# Patient Record
Sex: MALE | Race: WHITE | Employment: OTHER | URBAN - METROPOLITAN AREA
[De-identification: names, ages, dates, MRNs, and addresses within clinical notes are randomized per-mention and may not be internally consistent; named-entity substitution may affect disease eponyms.]

---

## 2017-01-03 ENCOUNTER — TELEPHONE (OUTPATIENT)
Dept: CARDIOLOGY CLINIC | Age: 72
End: 2017-01-03

## 2017-01-03 PROBLEM — G45.9 TIA (TRANSIENT ISCHEMIC ATTACK): Status: ACTIVE | Noted: 2017-01-03

## 2017-01-15 ENCOUNTER — CARE COORDINATION (OUTPATIENT)
Dept: CASE MANAGEMENT | Age: 72
End: 2017-01-15

## 2017-01-16 ENCOUNTER — TELEPHONE (OUTPATIENT)
Dept: INTERNAL MEDICINE CLINIC | Age: 72
End: 2017-01-16

## 2017-01-17 ENCOUNTER — CARE COORDINATION (OUTPATIENT)
Dept: CASE MANAGEMENT | Age: 72
End: 2017-01-17

## 2017-01-19 ENCOUNTER — CARE COORDINATION (OUTPATIENT)
Dept: CASE MANAGEMENT | Age: 72
End: 2017-01-19

## 2017-01-20 ENCOUNTER — OFFICE VISIT (OUTPATIENT)
Dept: INTERNAL MEDICINE CLINIC | Age: 72
End: 2017-01-20

## 2017-01-20 ENCOUNTER — OFFICE VISIT (OUTPATIENT)
Dept: CARDIOLOGY CLINIC | Age: 72
End: 2017-01-20

## 2017-01-20 VITALS
OXYGEN SATURATION: 97 % | DIASTOLIC BLOOD PRESSURE: 60 MMHG | HEIGHT: 73 IN | WEIGHT: 272.6 LBS | SYSTOLIC BLOOD PRESSURE: 110 MMHG | BODY MASS INDEX: 36.13 KG/M2 | HEART RATE: 112 BPM

## 2017-01-20 VITALS
HEART RATE: 94 BPM | SYSTOLIC BLOOD PRESSURE: 123 MMHG | BODY MASS INDEX: 36.05 KG/M2 | OXYGEN SATURATION: 97 % | DIASTOLIC BLOOD PRESSURE: 74 MMHG | WEIGHT: 272 LBS

## 2017-01-20 DIAGNOSIS — I25.10 CORONARY ARTERY DISEASE INVOLVING NATIVE CORONARY ARTERY OF NATIVE HEART WITHOUT ANGINA PECTORIS: ICD-10-CM

## 2017-01-20 DIAGNOSIS — I50.32 CHRONIC DIASTOLIC CONGESTIVE HEART FAILURE (HCC): ICD-10-CM

## 2017-01-20 DIAGNOSIS — I10 ESSENTIAL HYPERTENSION: ICD-10-CM

## 2017-01-20 DIAGNOSIS — D61.89 ANEMIA DUE TO OTHER BONE MARROW FAILURE (HCC): ICD-10-CM

## 2017-01-20 DIAGNOSIS — Z09 HOSPITAL DISCHARGE FOLLOW-UP: Primary | ICD-10-CM

## 2017-01-20 DIAGNOSIS — R06.09 DOE (DYSPNEA ON EXERTION): ICD-10-CM

## 2017-01-20 DIAGNOSIS — I48.20 CHRONIC ATRIAL FIBRILLATION (HCC): Primary | ICD-10-CM

## 2017-01-20 DIAGNOSIS — I63.9 CEREBROVASCULAR ACCIDENT (CVA), UNSPECIFIED MECHANISM (HCC): ICD-10-CM

## 2017-01-20 PROCEDURE — G8427 DOCREV CUR MEDS BY ELIG CLIN: HCPCS | Performed by: NURSE PRACTITIONER

## 2017-01-20 PROCEDURE — G8484 FLU IMMUNIZE NO ADMIN: HCPCS | Performed by: NURSE PRACTITIONER

## 2017-01-20 PROCEDURE — 1036F TOBACCO NON-USER: CPT | Performed by: NURSE PRACTITIONER

## 2017-01-20 PROCEDURE — G8598 ASA/ANTIPLAT THER USED: HCPCS | Performed by: NURSE PRACTITIONER

## 2017-01-20 PROCEDURE — 4040F PNEUMOC VAC/ADMIN/RCVD: CPT | Performed by: NURSE PRACTITIONER

## 2017-01-20 PROCEDURE — 1111F DSCHRG MED/CURRENT MED MERGE: CPT | Performed by: NURSE PRACTITIONER

## 2017-01-20 PROCEDURE — G8419 CALC BMI OUT NRM PARAM NOF/U: HCPCS | Performed by: NURSE PRACTITIONER

## 2017-01-20 PROCEDURE — 99214 OFFICE O/P EST MOD 30 MIN: CPT | Performed by: NURSE PRACTITIONER

## 2017-01-20 PROCEDURE — 1123F ACP DISCUSS/DSCN MKR DOCD: CPT | Performed by: NURSE PRACTITIONER

## 2017-01-20 PROCEDURE — 3017F COLORECTAL CA SCREEN DOC REV: CPT | Performed by: NURSE PRACTITIONER

## 2017-01-23 ENCOUNTER — CARE COORDINATION (OUTPATIENT)
Dept: CASE MANAGEMENT | Age: 72
End: 2017-01-23

## 2017-01-26 PROBLEM — I63.9 CEREBROVASCULAR ACCIDENT (CVA) (HCC): Status: ACTIVE | Noted: 2017-01-26

## 2017-01-26 PROBLEM — D61.9 ANEMIA DUE TO BONE MARROW FAILURE (HCC): Status: ACTIVE | Noted: 2017-01-26

## 2017-01-26 PROBLEM — G45.9 TIA (TRANSIENT ISCHEMIC ATTACK): Status: RESOLVED | Noted: 2017-01-03 | Resolved: 2017-01-26

## 2017-01-27 ENCOUNTER — CARE COORDINATION (OUTPATIENT)
Dept: CASE MANAGEMENT | Age: 72
End: 2017-01-27

## 2017-02-06 ENCOUNTER — OFFICE VISIT (OUTPATIENT)
Dept: CARDIOLOGY CLINIC | Age: 72
End: 2017-02-06

## 2017-02-06 ENCOUNTER — CARE COORDINATION (OUTPATIENT)
Dept: INTERNAL MEDICINE CLINIC | Age: 72
End: 2017-02-06

## 2017-02-06 VITALS
OXYGEN SATURATION: 98 % | WEIGHT: 269 LBS | HEART RATE: 90 BPM | DIASTOLIC BLOOD PRESSURE: 56 MMHG | HEIGHT: 73 IN | BODY MASS INDEX: 35.65 KG/M2 | SYSTOLIC BLOOD PRESSURE: 118 MMHG

## 2017-02-06 DIAGNOSIS — I10 ESSENTIAL HYPERTENSION: ICD-10-CM

## 2017-02-06 DIAGNOSIS — I63.9 CEREBROVASCULAR ACCIDENT (CVA), UNSPECIFIED MECHANISM (HCC): ICD-10-CM

## 2017-02-06 DIAGNOSIS — I48.20 CHRONIC ATRIAL FIBRILLATION (HCC): Primary | ICD-10-CM

## 2017-02-06 DIAGNOSIS — I50.32 CHRONIC DIASTOLIC CONGESTIVE HEART FAILURE (HCC): ICD-10-CM

## 2017-02-06 PROCEDURE — 99214 OFFICE O/P EST MOD 30 MIN: CPT | Performed by: INTERNAL MEDICINE

## 2017-02-06 PROCEDURE — 93000 ELECTROCARDIOGRAM COMPLETE: CPT | Performed by: INTERNAL MEDICINE

## 2017-02-06 PROCEDURE — 1111F DSCHRG MED/CURRENT MED MERGE: CPT | Performed by: INTERNAL MEDICINE

## 2017-02-06 PROCEDURE — G8427 DOCREV CUR MEDS BY ELIG CLIN: HCPCS | Performed by: INTERNAL MEDICINE

## 2017-02-06 PROCEDURE — 3017F COLORECTAL CA SCREEN DOC REV: CPT | Performed by: INTERNAL MEDICINE

## 2017-02-06 PROCEDURE — G8419 CALC BMI OUT NRM PARAM NOF/U: HCPCS | Performed by: INTERNAL MEDICINE

## 2017-02-06 PROCEDURE — 1123F ACP DISCUSS/DSCN MKR DOCD: CPT | Performed by: INTERNAL MEDICINE

## 2017-02-06 PROCEDURE — 4040F PNEUMOC VAC/ADMIN/RCVD: CPT | Performed by: INTERNAL MEDICINE

## 2017-02-06 PROCEDURE — 1036F TOBACCO NON-USER: CPT | Performed by: INTERNAL MEDICINE

## 2017-02-06 PROCEDURE — G8484 FLU IMMUNIZE NO ADMIN: HCPCS | Performed by: INTERNAL MEDICINE

## 2017-02-06 PROCEDURE — G8598 ASA/ANTIPLAT THER USED: HCPCS | Performed by: INTERNAL MEDICINE

## 2017-02-06 RX ORDER — ATORVASTATIN CALCIUM 40 MG/1
40 TABLET, FILM COATED ORAL DAILY
Qty: 90 TABLET | Refills: 0 | Status: SHIPPED | OUTPATIENT
Start: 2017-02-06 | End: 2017-03-14 | Stop reason: ALTCHOICE

## 2017-02-12 PROCEDURE — 99496 TRANSJ CARE MGMT HIGH F2F 7D: CPT | Performed by: INTERNAL MEDICINE

## 2017-03-06 ENCOUNTER — TELEPHONE (OUTPATIENT)
Dept: CARDIOLOGY CLINIC | Age: 72
End: 2017-03-06

## 2017-03-10 ENCOUNTER — TELEPHONE (OUTPATIENT)
Dept: INTERNAL MEDICINE CLINIC | Age: 72
End: 2017-03-10

## 2017-03-10 ENCOUNTER — TELEPHONE (OUTPATIENT)
Dept: CARDIOLOGY CLINIC | Age: 72
End: 2017-03-10

## 2017-03-10 DIAGNOSIS — I50.32 CHRONIC DIASTOLIC CONGESTIVE HEART FAILURE (HCC): Primary | ICD-10-CM

## 2017-03-13 LAB
ANION GAP SERPL CALCULATED.3IONS-SCNC: 15 MMOL/L (ref 3–16)
BUN BLDV-MCNC: 22 MG/DL (ref 7–20)
CALCIUM SERPL-MCNC: 9.1 MG/DL (ref 8.3–10.6)
CHLORIDE BLD-SCNC: 102 MMOL/L (ref 99–110)
CO2: 29 MMOL/L (ref 21–32)
CREAT SERPL-MCNC: 0.6 MG/DL (ref 0.8–1.3)
GFR AFRICAN AMERICAN: >60
GFR NON-AFRICAN AMERICAN: >60
GLUCOSE BLD-MCNC: 119 MG/DL (ref 70–99)
POTASSIUM SERPL-SCNC: 3.6 MMOL/L (ref 3.5–5.1)
SODIUM BLD-SCNC: 146 MMOL/L (ref 136–145)

## 2017-03-13 RX ORDER — LISINOPRIL 5 MG/1
TABLET ORAL
Qty: 90 TABLET | Refills: 3 | Status: SHIPPED | OUTPATIENT
Start: 2017-03-13 | End: 2019-05-22 | Stop reason: SDUPTHER

## 2017-03-14 ENCOUNTER — OFFICE VISIT (OUTPATIENT)
Dept: INTERNAL MEDICINE CLINIC | Age: 72
End: 2017-03-14

## 2017-03-14 VITALS
RESPIRATION RATE: 20 BRPM | DIASTOLIC BLOOD PRESSURE: 75 MMHG | HEART RATE: 84 BPM | HEIGHT: 72 IN | WEIGHT: 291 LBS | SYSTOLIC BLOOD PRESSURE: 136 MMHG | BODY MASS INDEX: 39.42 KG/M2

## 2017-03-14 DIAGNOSIS — R60.1 ANASARCA: ICD-10-CM

## 2017-03-14 DIAGNOSIS — C83.31 DIFFUSE LARGE B-CELL LYMPHOMA OF LYMPH NODES OF NECK (HCC): ICD-10-CM

## 2017-03-14 DIAGNOSIS — L03.119 CELLULITIS OF LOWER EXTREMITY, UNSPECIFIED LATERALITY: ICD-10-CM

## 2017-03-14 DIAGNOSIS — R53.1 GENERAL WEAKNESS: ICD-10-CM

## 2017-03-14 DIAGNOSIS — I48.20 CHRONIC ATRIAL FIBRILLATION (HCC): ICD-10-CM

## 2017-03-14 DIAGNOSIS — I50.9 ACUTE ON CHRONIC CONGESTIVE HEART FAILURE, UNSPECIFIED CONGESTIVE HEART FAILURE TYPE: Primary | ICD-10-CM

## 2017-03-14 PROCEDURE — 99214 OFFICE O/P EST MOD 30 MIN: CPT | Performed by: INTERNAL MEDICINE

## 2017-03-14 PROCEDURE — 3017F COLORECTAL CA SCREEN DOC REV: CPT | Performed by: INTERNAL MEDICINE

## 2017-03-14 PROCEDURE — 4040F PNEUMOC VAC/ADMIN/RCVD: CPT | Performed by: INTERNAL MEDICINE

## 2017-03-14 PROCEDURE — 1123F ACP DISCUSS/DSCN MKR DOCD: CPT | Performed by: INTERNAL MEDICINE

## 2017-03-14 PROCEDURE — 1036F TOBACCO NON-USER: CPT | Performed by: INTERNAL MEDICINE

## 2017-03-14 PROCEDURE — G8598 ASA/ANTIPLAT THER USED: HCPCS | Performed by: INTERNAL MEDICINE

## 2017-03-14 PROCEDURE — G8417 CALC BMI ABV UP PARAM F/U: HCPCS | Performed by: INTERNAL MEDICINE

## 2017-03-14 PROCEDURE — G8427 DOCREV CUR MEDS BY ELIG CLIN: HCPCS | Performed by: INTERNAL MEDICINE

## 2017-03-14 PROCEDURE — G8484 FLU IMMUNIZE NO ADMIN: HCPCS | Performed by: INTERNAL MEDICINE

## 2017-03-14 RX ORDER — METOPROLOL SUCCINATE 25 MG/1
TABLET, EXTENDED RELEASE ORAL
Qty: 90 TABLET | Refills: 1 | Status: SHIPPED | OUTPATIENT
Start: 2017-03-14 | End: 2017-09-18 | Stop reason: SDUPTHER

## 2017-03-15 ENCOUNTER — TELEPHONE (OUTPATIENT)
Dept: CARDIOLOGY CLINIC | Age: 72
End: 2017-03-15

## 2017-03-15 ENCOUNTER — OFFICE VISIT (OUTPATIENT)
Dept: CARDIOLOGY CLINIC | Age: 72
End: 2017-03-15

## 2017-03-15 ENCOUNTER — CARE COORDINATION (OUTPATIENT)
Dept: INTERNAL MEDICINE CLINIC | Age: 72
End: 2017-03-15

## 2017-03-15 VITALS
DIASTOLIC BLOOD PRESSURE: 66 MMHG | SYSTOLIC BLOOD PRESSURE: 116 MMHG | BODY MASS INDEX: 38.3 KG/M2 | HEIGHT: 73 IN | HEART RATE: 86 BPM | WEIGHT: 289 LBS | OXYGEN SATURATION: 96 %

## 2017-03-15 DIAGNOSIS — I48.20 CHRONIC ATRIAL FIBRILLATION (HCC): Primary | ICD-10-CM

## 2017-03-15 DIAGNOSIS — I50.32 CHRONIC DIASTOLIC CONGESTIVE HEART FAILURE (HCC): ICD-10-CM

## 2017-03-15 DIAGNOSIS — I10 ESSENTIAL HYPERTENSION: ICD-10-CM

## 2017-03-15 DIAGNOSIS — I63.9 CEREBROVASCULAR ACCIDENT (CVA), UNSPECIFIED MECHANISM (HCC): ICD-10-CM

## 2017-03-15 PROCEDURE — G8484 FLU IMMUNIZE NO ADMIN: HCPCS | Performed by: INTERNAL MEDICINE

## 2017-03-15 PROCEDURE — 4040F PNEUMOC VAC/ADMIN/RCVD: CPT | Performed by: INTERNAL MEDICINE

## 2017-03-15 PROCEDURE — G8427 DOCREV CUR MEDS BY ELIG CLIN: HCPCS | Performed by: INTERNAL MEDICINE

## 2017-03-15 PROCEDURE — 99214 OFFICE O/P EST MOD 30 MIN: CPT | Performed by: INTERNAL MEDICINE

## 2017-03-15 PROCEDURE — G8598 ASA/ANTIPLAT THER USED: HCPCS | Performed by: INTERNAL MEDICINE

## 2017-03-15 PROCEDURE — 1036F TOBACCO NON-USER: CPT | Performed by: INTERNAL MEDICINE

## 2017-03-15 PROCEDURE — 3017F COLORECTAL CA SCREEN DOC REV: CPT | Performed by: INTERNAL MEDICINE

## 2017-03-15 PROCEDURE — G8417 CALC BMI ABV UP PARAM F/U: HCPCS | Performed by: INTERNAL MEDICINE

## 2017-03-15 PROCEDURE — 1123F ACP DISCUSS/DSCN MKR DOCD: CPT | Performed by: INTERNAL MEDICINE

## 2017-03-15 RX ORDER — POTASSIUM CHLORIDE 20 MEQ/1
20 TABLET, EXTENDED RELEASE ORAL 2 TIMES DAILY
Qty: 60 TABLET | Refills: 3 | Status: SHIPPED | OUTPATIENT
Start: 2017-03-15 | End: 2017-03-15 | Stop reason: SDUPTHER

## 2017-03-15 RX ORDER — METOLAZONE 2.5 MG/1
TABLET ORAL
Qty: 30 TABLET | Refills: 3 | Status: SHIPPED | OUTPATIENT
Start: 2017-03-15 | End: 2017-03-15 | Stop reason: SDUPTHER

## 2017-03-15 RX ORDER — FUROSEMIDE 40 MG/1
60 TABLET ORAL 2 TIMES DAILY
Qty: 90 TABLET | Refills: 3 | Status: SHIPPED | OUTPATIENT
Start: 2017-03-15 | End: 2017-03-24 | Stop reason: DRUGHIGH

## 2017-03-15 RX ORDER — POTASSIUM CHLORIDE 20 MEQ/1
TABLET, EXTENDED RELEASE ORAL
Qty: 180 TABLET | Refills: 3 | Status: SHIPPED | OUTPATIENT
Start: 2017-03-15 | End: 2017-04-26 | Stop reason: DRUGHIGH

## 2017-03-15 RX ORDER — METOLAZONE 2.5 MG/1
TABLET ORAL
Qty: 30 TABLET | Refills: 3 | Status: SHIPPED | OUTPATIENT
Start: 2017-03-15 | End: 2017-03-24 | Stop reason: CLARIF

## 2017-03-15 RX ORDER — ATORVASTATIN CALCIUM 40 MG/1
40 TABLET, FILM COATED ORAL DAILY
Qty: 90 TABLET | Refills: 0 | Status: SHIPPED | OUTPATIENT
Start: 2017-03-15 | End: 2017-03-15 | Stop reason: DRUGHIGH

## 2017-03-16 DIAGNOSIS — R60.1 ANASARCA: ICD-10-CM

## 2017-03-16 DIAGNOSIS — I50.33 DIASTOLIC CHF, ACUTE ON CHRONIC (HCC): Primary | ICD-10-CM

## 2017-03-17 ENCOUNTER — CARE COORDINATION (OUTPATIENT)
Dept: INTERNAL MEDICINE CLINIC | Age: 72
End: 2017-03-17

## 2017-03-21 LAB
ANION GAP SERPL CALCULATED.3IONS-SCNC: 14 MMOL/L (ref 3–16)
BUN BLDV-MCNC: 20 MG/DL (ref 7–20)
CALCIUM SERPL-MCNC: 8.9 MG/DL (ref 8.3–10.6)
CHLORIDE BLD-SCNC: 103 MMOL/L (ref 99–110)
CO2: 29 MMOL/L (ref 21–32)
CREAT SERPL-MCNC: 0.6 MG/DL (ref 0.8–1.3)
GFR AFRICAN AMERICAN: >60
GFR NON-AFRICAN AMERICAN: >60
GLUCOSE BLD-MCNC: 117 MG/DL (ref 70–99)
POTASSIUM SERPL-SCNC: 3.8 MMOL/L (ref 3.5–5.1)
SODIUM BLD-SCNC: 146 MMOL/L (ref 136–145)

## 2017-03-23 ENCOUNTER — CARE COORDINATION (OUTPATIENT)
Dept: INTERNAL MEDICINE CLINIC | Age: 72
End: 2017-03-23

## 2017-03-23 ENCOUNTER — TELEPHONE (OUTPATIENT)
Dept: CARDIOLOGY CLINIC | Age: 72
End: 2017-03-23

## 2017-03-24 ENCOUNTER — OFFICE VISIT (OUTPATIENT)
Dept: CARDIOLOGY CLINIC | Age: 72
End: 2017-03-24

## 2017-03-24 ENCOUNTER — CARE COORDINATION (OUTPATIENT)
Dept: INTERNAL MEDICINE CLINIC | Age: 72
End: 2017-03-24

## 2017-03-24 VITALS
HEIGHT: 73 IN | HEART RATE: 84 BPM | SYSTOLIC BLOOD PRESSURE: 128 MMHG | DIASTOLIC BLOOD PRESSURE: 76 MMHG | WEIGHT: 280 LBS | BODY MASS INDEX: 37.11 KG/M2 | OXYGEN SATURATION: 98 %

## 2017-03-24 DIAGNOSIS — I10 ESSENTIAL HYPERTENSION: ICD-10-CM

## 2017-03-24 DIAGNOSIS — I48.20 CHRONIC ATRIAL FIBRILLATION (HCC): ICD-10-CM

## 2017-03-24 DIAGNOSIS — L03.115 CELLULITIS OF RIGHT LEG: ICD-10-CM

## 2017-03-24 DIAGNOSIS — I25.10 CORONARY ARTERY DISEASE INVOLVING NATIVE CORONARY ARTERY OF NATIVE HEART WITHOUT ANGINA PECTORIS: ICD-10-CM

## 2017-03-24 DIAGNOSIS — I50.32 CHRONIC DIASTOLIC CONGESTIVE HEART FAILURE (HCC): Primary | ICD-10-CM

## 2017-03-24 PROCEDURE — G8417 CALC BMI ABV UP PARAM F/U: HCPCS | Performed by: NURSE PRACTITIONER

## 2017-03-24 PROCEDURE — G8484 FLU IMMUNIZE NO ADMIN: HCPCS | Performed by: NURSE PRACTITIONER

## 2017-03-24 PROCEDURE — 3017F COLORECTAL CA SCREEN DOC REV: CPT | Performed by: NURSE PRACTITIONER

## 2017-03-24 PROCEDURE — G8598 ASA/ANTIPLAT THER USED: HCPCS | Performed by: NURSE PRACTITIONER

## 2017-03-24 PROCEDURE — 99214 OFFICE O/P EST MOD 30 MIN: CPT | Performed by: NURSE PRACTITIONER

## 2017-03-24 PROCEDURE — G8427 DOCREV CUR MEDS BY ELIG CLIN: HCPCS | Performed by: NURSE PRACTITIONER

## 2017-03-24 PROCEDURE — 4040F PNEUMOC VAC/ADMIN/RCVD: CPT | Performed by: NURSE PRACTITIONER

## 2017-03-24 PROCEDURE — 1036F TOBACCO NON-USER: CPT | Performed by: NURSE PRACTITIONER

## 2017-03-24 PROCEDURE — 1123F ACP DISCUSS/DSCN MKR DOCD: CPT | Performed by: NURSE PRACTITIONER

## 2017-03-24 RX ORDER — FUROSEMIDE 80 MG
80 TABLET ORAL 2 TIMES DAILY
COMMUNITY
End: 2017-04-10 | Stop reason: ALTCHOICE

## 2017-04-04 ENCOUNTER — CARE COORDINATION (OUTPATIENT)
Dept: INTERNAL MEDICINE CLINIC | Age: 72
End: 2017-04-04

## 2017-04-04 ENCOUNTER — OFFICE VISIT (OUTPATIENT)
Dept: INTERNAL MEDICINE CLINIC | Age: 72
End: 2017-04-04

## 2017-04-04 VITALS
SYSTOLIC BLOOD PRESSURE: 131 MMHG | HEART RATE: 117 BPM | HEIGHT: 72 IN | RESPIRATION RATE: 20 BRPM | BODY MASS INDEX: 38.74 KG/M2 | DIASTOLIC BLOOD PRESSURE: 76 MMHG | WEIGHT: 286 LBS

## 2017-04-04 DIAGNOSIS — I87.2 VENOUS STASIS DERMATITIS OF BOTH LOWER EXTREMITIES: ICD-10-CM

## 2017-04-04 DIAGNOSIS — I50.32 CHRONIC DIASTOLIC CONGESTIVE HEART FAILURE (HCC): Primary | ICD-10-CM

## 2017-04-04 DIAGNOSIS — I48.20 CHRONIC ATRIAL FIBRILLATION (HCC): ICD-10-CM

## 2017-04-04 DIAGNOSIS — I50.32 CHRONIC DIASTOLIC CONGESTIVE HEART FAILURE (HCC): ICD-10-CM

## 2017-04-04 DIAGNOSIS — R60.1 ANASARCA: ICD-10-CM

## 2017-04-04 DIAGNOSIS — Z91.199 PATIENT NONADHERENCE: ICD-10-CM

## 2017-04-04 LAB
ALBUMIN SERPL-MCNC: 4.2 G/DL (ref 3.4–5)
ANION GAP SERPL CALCULATED.3IONS-SCNC: 18 MMOL/L (ref 3–16)
BASOPHILS ABSOLUTE: 0.1 K/UL (ref 0–0.2)
BASOPHILS RELATIVE PERCENT: 0.7 %
BUN BLDV-MCNC: 20 MG/DL (ref 7–20)
CALCIUM SERPL-MCNC: 9.2 MG/DL (ref 8.3–10.6)
CHLORIDE BLD-SCNC: 99 MMOL/L (ref 99–110)
CO2: 27 MMOL/L (ref 21–32)
CREAT SERPL-MCNC: 0.6 MG/DL (ref 0.8–1.3)
EOSINOPHILS ABSOLUTE: 0.2 K/UL (ref 0–0.6)
EOSINOPHILS RELATIVE PERCENT: 3.2 %
GFR AFRICAN AMERICAN: >60
GFR NON-AFRICAN AMERICAN: >60
GLUCOSE BLD-MCNC: 115 MG/DL (ref 70–99)
HCT VFR BLD CALC: 38 % (ref 40.5–52.5)
HEMOGLOBIN: 12.2 G/DL (ref 13.5–17.5)
LYMPHOCYTES ABSOLUTE: 0.6 K/UL (ref 1–5.1)
LYMPHOCYTES RELATIVE PERCENT: 9 %
MCH RBC QN AUTO: 32.2 PG (ref 26–34)
MCHC RBC AUTO-ENTMCNC: 32.1 G/DL (ref 31–36)
MCV RBC AUTO: 100.4 FL (ref 80–100)
MONOCYTES ABSOLUTE: 0.9 K/UL (ref 0–1.3)
MONOCYTES RELATIVE PERCENT: 12.6 %
NEUTROPHILS ABSOLUTE: 5.2 K/UL (ref 1.7–7.7)
NEUTROPHILS RELATIVE PERCENT: 74.5 %
PDW BLD-RTO: 15.8 % (ref 12.4–15.4)
PHOSPHORUS: 2.7 MG/DL (ref 2.5–4.9)
PLATELET # BLD: 127 K/UL (ref 135–450)
PMV BLD AUTO: 9.2 FL (ref 5–10.5)
POTASSIUM SERPL-SCNC: 3.8 MMOL/L (ref 3.5–5.1)
RBC # BLD: 3.78 M/UL (ref 4.2–5.9)
SODIUM BLD-SCNC: 144 MMOL/L (ref 136–145)
WBC # BLD: 7 K/UL (ref 4–11)

## 2017-04-04 PROCEDURE — 1036F TOBACCO NON-USER: CPT | Performed by: INTERNAL MEDICINE

## 2017-04-04 PROCEDURE — 1123F ACP DISCUSS/DSCN MKR DOCD: CPT | Performed by: INTERNAL MEDICINE

## 2017-04-04 PROCEDURE — G8427 DOCREV CUR MEDS BY ELIG CLIN: HCPCS | Performed by: INTERNAL MEDICINE

## 2017-04-04 PROCEDURE — G8598 ASA/ANTIPLAT THER USED: HCPCS | Performed by: INTERNAL MEDICINE

## 2017-04-04 PROCEDURE — 99215 OFFICE O/P EST HI 40 MIN: CPT | Performed by: INTERNAL MEDICINE

## 2017-04-04 PROCEDURE — G8417 CALC BMI ABV UP PARAM F/U: HCPCS | Performed by: INTERNAL MEDICINE

## 2017-04-04 PROCEDURE — 3017F COLORECTAL CA SCREEN DOC REV: CPT | Performed by: INTERNAL MEDICINE

## 2017-04-04 PROCEDURE — 4040F PNEUMOC VAC/ADMIN/RCVD: CPT | Performed by: INTERNAL MEDICINE

## 2017-04-10 ENCOUNTER — OFFICE VISIT (OUTPATIENT)
Dept: CARDIOLOGY CLINIC | Age: 72
End: 2017-04-10

## 2017-04-10 VITALS
BODY MASS INDEX: 37.77 KG/M2 | DIASTOLIC BLOOD PRESSURE: 60 MMHG | WEIGHT: 285 LBS | OXYGEN SATURATION: 97 % | SYSTOLIC BLOOD PRESSURE: 106 MMHG | HEART RATE: 94 BPM | HEIGHT: 73 IN

## 2017-04-10 DIAGNOSIS — I25.10 CORONARY ARTERY DISEASE INVOLVING NATIVE CORONARY ARTERY OF NATIVE HEART WITHOUT ANGINA PECTORIS: ICD-10-CM

## 2017-04-10 DIAGNOSIS — I50.33 ACUTE ON CHRONIC DIASTOLIC (CONGESTIVE) HEART FAILURE (HCC): ICD-10-CM

## 2017-04-10 DIAGNOSIS — I10 ESSENTIAL HYPERTENSION: Primary | ICD-10-CM

## 2017-04-10 DIAGNOSIS — I48.20 CHRONIC ATRIAL FIBRILLATION (HCC): ICD-10-CM

## 2017-04-10 PROCEDURE — G8417 CALC BMI ABV UP PARAM F/U: HCPCS | Performed by: INTERNAL MEDICINE

## 2017-04-10 PROCEDURE — 1123F ACP DISCUSS/DSCN MKR DOCD: CPT | Performed by: INTERNAL MEDICINE

## 2017-04-10 PROCEDURE — 3017F COLORECTAL CA SCREEN DOC REV: CPT | Performed by: INTERNAL MEDICINE

## 2017-04-10 PROCEDURE — 1036F TOBACCO NON-USER: CPT | Performed by: INTERNAL MEDICINE

## 2017-04-10 PROCEDURE — G8427 DOCREV CUR MEDS BY ELIG CLIN: HCPCS | Performed by: INTERNAL MEDICINE

## 2017-04-10 PROCEDURE — 99214 OFFICE O/P EST MOD 30 MIN: CPT | Performed by: INTERNAL MEDICINE

## 2017-04-10 PROCEDURE — G8598 ASA/ANTIPLAT THER USED: HCPCS | Performed by: INTERNAL MEDICINE

## 2017-04-10 PROCEDURE — 4040F PNEUMOC VAC/ADMIN/RCVD: CPT | Performed by: INTERNAL MEDICINE

## 2017-04-10 RX ORDER — TORSEMIDE 20 MG/1
40 TABLET ORAL 2 TIMES DAILY
Qty: 120 TABLET | Refills: 5 | Status: SHIPPED | OUTPATIENT
Start: 2017-04-10 | End: 2017-04-10 | Stop reason: SDUPTHER

## 2017-04-11 RX ORDER — TORSEMIDE 20 MG/1
TABLET ORAL
Qty: 360 TABLET | Refills: 3 | Status: SHIPPED | OUTPATIENT
Start: 2017-04-11 | End: 2017-09-01 | Stop reason: SDUPTHER

## 2017-04-17 ENCOUNTER — TELEPHONE (OUTPATIENT)
Dept: INTERNAL MEDICINE CLINIC | Age: 72
End: 2017-04-17

## 2017-04-18 ENCOUNTER — TELEPHONE (OUTPATIENT)
Dept: INTERNAL MEDICINE CLINIC | Age: 72
End: 2017-04-18

## 2017-04-21 ENCOUNTER — TELEPHONE (OUTPATIENT)
Dept: CARDIOLOGY CLINIC | Age: 72
End: 2017-04-21

## 2017-04-26 DIAGNOSIS — I50.32 CHRONIC DIASTOLIC CONGESTIVE HEART FAILURE (HCC): Primary | ICD-10-CM

## 2017-04-26 DIAGNOSIS — R06.02 SOB (SHORTNESS OF BREATH) ON EXERTION: ICD-10-CM

## 2017-04-26 DIAGNOSIS — R60.0 EDEMA EXTREMITIES: ICD-10-CM

## 2017-04-26 RX ORDER — POTASSIUM CHLORIDE 20 MEQ/1
20 TABLET, EXTENDED RELEASE ORAL 2 TIMES DAILY
COMMUNITY
End: 2018-02-01 | Stop reason: DRUGHIGH

## 2017-04-28 ENCOUNTER — TELEPHONE (OUTPATIENT)
Dept: INTERNAL MEDICINE CLINIC | Age: 72
End: 2017-04-28

## 2017-05-03 LAB
ANION GAP SERPL CALCULATED.3IONS-SCNC: 17 MMOL/L (ref 3–16)
BUN BLDV-MCNC: 19 MG/DL (ref 7–20)
CALCIUM SERPL-MCNC: 8.7 MG/DL (ref 8.3–10.6)
CHLORIDE BLD-SCNC: 101 MMOL/L (ref 99–110)
CO2: 29 MMOL/L (ref 21–32)
CREAT SERPL-MCNC: 0.6 MG/DL (ref 0.8–1.3)
GFR AFRICAN AMERICAN: >60
GFR NON-AFRICAN AMERICAN: >60
GLUCOSE BLD-MCNC: 108 MG/DL (ref 70–99)
MAGNESIUM: 2.4 MG/DL (ref 1.8–2.4)
POTASSIUM SERPL-SCNC: 3.8 MMOL/L (ref 3.5–5.1)
SODIUM BLD-SCNC: 147 MMOL/L (ref 136–145)

## 2017-05-08 ENCOUNTER — OFFICE VISIT (OUTPATIENT)
Dept: CARDIOLOGY CLINIC | Age: 72
End: 2017-05-08

## 2017-05-08 VITALS
HEART RATE: 94 BPM | DIASTOLIC BLOOD PRESSURE: 80 MMHG | WEIGHT: 267.2 LBS | OXYGEN SATURATION: 96 % | SYSTOLIC BLOOD PRESSURE: 110 MMHG | BODY MASS INDEX: 36.19 KG/M2 | HEIGHT: 72 IN

## 2017-05-08 DIAGNOSIS — I25.10 CORONARY ARTERY DISEASE INVOLVING NATIVE CORONARY ARTERY OF NATIVE HEART WITHOUT ANGINA PECTORIS: ICD-10-CM

## 2017-05-08 DIAGNOSIS — I50.33 ACUTE ON CHRONIC DIASTOLIC (CONGESTIVE) HEART FAILURE (HCC): Primary | ICD-10-CM

## 2017-05-08 DIAGNOSIS — I10 ESSENTIAL HYPERTENSION: ICD-10-CM

## 2017-05-08 DIAGNOSIS — I48.20 CHRONIC ATRIAL FIBRILLATION (HCC): ICD-10-CM

## 2017-05-08 PROCEDURE — 1123F ACP DISCUSS/DSCN MKR DOCD: CPT | Performed by: NURSE PRACTITIONER

## 2017-05-08 PROCEDURE — G8598 ASA/ANTIPLAT THER USED: HCPCS | Performed by: NURSE PRACTITIONER

## 2017-05-08 PROCEDURE — G8417 CALC BMI ABV UP PARAM F/U: HCPCS | Performed by: NURSE PRACTITIONER

## 2017-05-08 PROCEDURE — 99213 OFFICE O/P EST LOW 20 MIN: CPT | Performed by: NURSE PRACTITIONER

## 2017-05-08 PROCEDURE — G8427 DOCREV CUR MEDS BY ELIG CLIN: HCPCS | Performed by: NURSE PRACTITIONER

## 2017-05-08 PROCEDURE — 4040F PNEUMOC VAC/ADMIN/RCVD: CPT | Performed by: NURSE PRACTITIONER

## 2017-05-08 PROCEDURE — 1036F TOBACCO NON-USER: CPT | Performed by: NURSE PRACTITIONER

## 2017-05-08 PROCEDURE — 3017F COLORECTAL CA SCREEN DOC REV: CPT | Performed by: NURSE PRACTITIONER

## 2017-05-08 RX ORDER — ASPIRIN 81 MG/1
81 TABLET ORAL DAILY
Qty: 30 TABLET | Refills: 3 | Status: SHIPPED | OUTPATIENT
Start: 2017-05-08 | End: 2017-05-08 | Stop reason: SDUPTHER

## 2017-05-08 RX ORDER — ASPIRIN 81 MG/1
TABLET, DELAYED RELEASE ORAL
Qty: 90 TABLET | Refills: 3 | Status: SHIPPED | OUTPATIENT
Start: 2017-05-08 | End: 2018-05-10 | Stop reason: SDUPTHER

## 2017-05-09 RX ORDER — ATORVASTATIN CALCIUM 20 MG/1
20 TABLET, FILM COATED ORAL DAILY
Qty: 30 TABLET | Refills: 6 | Status: SHIPPED | OUTPATIENT
Start: 2017-05-09 | End: 2017-05-09 | Stop reason: SDUPTHER

## 2017-05-10 RX ORDER — ATORVASTATIN CALCIUM 20 MG/1
TABLET, FILM COATED ORAL
Qty: 90 TABLET | Refills: 3 | Status: SHIPPED | OUTPATIENT
Start: 2017-05-10 | End: 2018-05-14 | Stop reason: SDUPTHER

## 2017-05-12 ENCOUNTER — TELEPHONE (OUTPATIENT)
Dept: CARDIOLOGY CLINIC | Age: 72
End: 2017-05-12

## 2017-05-15 LAB
ALBUMIN SERPL-MCNC: 3.8 G/DL (ref 3.4–5)
ALP BLD-CCNC: 136 U/L (ref 40–129)
ALT SERPL-CCNC: 33 U/L (ref 10–40)
AST SERPL-CCNC: 42 U/L (ref 15–37)
BILIRUB SERPL-MCNC: 0.7 MG/DL (ref 0–1)
BILIRUBIN DIRECT: <0.2 MG/DL (ref 0–0.3)
BILIRUBIN, INDIRECT: ABNORMAL MG/DL (ref 0–1)
CHOLESTEROL, TOTAL: 91 MG/DL (ref 0–199)
HDLC SERPL-MCNC: 43 MG/DL (ref 40–60)
LDL CHOLESTEROL CALCULATED: 26 MG/DL
TOTAL PROTEIN: 6.7 G/DL (ref 6.4–8.2)
TRIGL SERPL-MCNC: 109 MG/DL (ref 0–150)
VLDLC SERPL CALC-MCNC: 22 MG/DL

## 2017-05-17 DIAGNOSIS — I50.32 CHRONIC DIASTOLIC CONGESTIVE HEART FAILURE (HCC): Primary | ICD-10-CM

## 2017-05-17 RX ORDER — POTASSIUM CHLORIDE 20 MEQ/1
20 TABLET, EXTENDED RELEASE ORAL 2 TIMES DAILY
Qty: 60 TABLET | Refills: 3 | Status: CANCELLED
Start: 2017-05-17

## 2017-05-17 RX ORDER — SPIRONOLACTONE 25 MG/1
25 TABLET ORAL DAILY
Qty: 30 TABLET | Refills: 6 | Status: CANCELLED | OUTPATIENT
Start: 2017-05-17

## 2017-05-18 RX ORDER — SPIRONOLACTONE 25 MG/1
25 TABLET ORAL DAILY
Qty: 30 TABLET | Refills: 6 | Status: SHIPPED | OUTPATIENT
Start: 2017-05-18 | End: 2017-05-18 | Stop reason: SDUPTHER

## 2017-05-19 ENCOUNTER — TELEPHONE (OUTPATIENT)
Dept: CARDIOLOGY CLINIC | Age: 72
End: 2017-05-19

## 2017-05-19 RX ORDER — SPIRONOLACTONE 25 MG/1
TABLET ORAL
Qty: 90 TABLET | Refills: 3 | Status: ON HOLD | OUTPATIENT
Start: 2017-05-19 | End: 2019-12-09 | Stop reason: HOSPADM

## 2017-06-02 RX ORDER — TORSEMIDE 20 MG/1
TABLET ORAL
Qty: 360 TABLET | Refills: 3 | Status: SHIPPED | OUTPATIENT
Start: 2017-06-02 | End: 2017-06-19 | Stop reason: SDUPTHER

## 2017-06-19 ENCOUNTER — OFFICE VISIT (OUTPATIENT)
Dept: CARDIOLOGY CLINIC | Age: 72
End: 2017-06-19

## 2017-06-19 VITALS
DIASTOLIC BLOOD PRESSURE: 64 MMHG | WEIGHT: 265 LBS | OXYGEN SATURATION: 97 % | BODY MASS INDEX: 35.12 KG/M2 | SYSTOLIC BLOOD PRESSURE: 100 MMHG | HEIGHT: 73 IN | HEART RATE: 86 BPM

## 2017-06-19 DIAGNOSIS — I25.10 CORONARY ARTERY DISEASE INVOLVING NATIVE CORONARY ARTERY OF NATIVE HEART WITHOUT ANGINA PECTORIS: ICD-10-CM

## 2017-06-19 DIAGNOSIS — I10 ESSENTIAL HYPERTENSION: ICD-10-CM

## 2017-06-19 DIAGNOSIS — I48.20 CHRONIC ATRIAL FIBRILLATION (HCC): Primary | ICD-10-CM

## 2017-06-19 DIAGNOSIS — I50.32 CHRONIC DIASTOLIC CONGESTIVE HEART FAILURE (HCC): ICD-10-CM

## 2017-06-19 PROCEDURE — 4040F PNEUMOC VAC/ADMIN/RCVD: CPT | Performed by: INTERNAL MEDICINE

## 2017-06-19 PROCEDURE — 1123F ACP DISCUSS/DSCN MKR DOCD: CPT | Performed by: INTERNAL MEDICINE

## 2017-06-19 PROCEDURE — G8598 ASA/ANTIPLAT THER USED: HCPCS | Performed by: INTERNAL MEDICINE

## 2017-06-19 PROCEDURE — 1036F TOBACCO NON-USER: CPT | Performed by: INTERNAL MEDICINE

## 2017-06-19 PROCEDURE — G8417 CALC BMI ABV UP PARAM F/U: HCPCS | Performed by: INTERNAL MEDICINE

## 2017-06-19 PROCEDURE — 99214 OFFICE O/P EST MOD 30 MIN: CPT | Performed by: INTERNAL MEDICINE

## 2017-06-19 PROCEDURE — 3017F COLORECTAL CA SCREEN DOC REV: CPT | Performed by: INTERNAL MEDICINE

## 2017-06-19 PROCEDURE — G8427 DOCREV CUR MEDS BY ELIG CLIN: HCPCS | Performed by: INTERNAL MEDICINE

## 2017-06-19 PROCEDURE — 93000 ELECTROCARDIOGRAM COMPLETE: CPT | Performed by: INTERNAL MEDICINE

## 2017-06-22 ENCOUNTER — CARE COORDINATION (OUTPATIENT)
Dept: INTERNAL MEDICINE CLINIC | Age: 72
End: 2017-06-22

## 2017-07-07 RX ORDER — SPIRONOLACTONE 25 MG/1
TABLET ORAL
Qty: 90 TABLET | Refills: 3 | Status: SHIPPED | OUTPATIENT
Start: 2017-07-07 | End: 2017-07-07 | Stop reason: SDUPTHER

## 2017-07-10 RX ORDER — SPIRONOLACTONE 25 MG/1
TABLET ORAL
Qty: 30 TABLET | Refills: 5 | Status: SHIPPED | OUTPATIENT
Start: 2017-07-10 | End: 2017-09-01 | Stop reason: SDUPTHER

## 2017-09-01 ENCOUNTER — OFFICE VISIT (OUTPATIENT)
Dept: CARDIOLOGY CLINIC | Age: 72
End: 2017-09-01

## 2017-09-01 VITALS
SYSTOLIC BLOOD PRESSURE: 130 MMHG | DIASTOLIC BLOOD PRESSURE: 70 MMHG | HEIGHT: 73 IN | WEIGHT: 264 LBS | HEART RATE: 94 BPM | OXYGEN SATURATION: 98 % | BODY MASS INDEX: 34.99 KG/M2

## 2017-09-01 DIAGNOSIS — I10 ESSENTIAL HYPERTENSION: ICD-10-CM

## 2017-09-01 DIAGNOSIS — I48.20 CHRONIC ATRIAL FIBRILLATION (HCC): ICD-10-CM

## 2017-09-01 DIAGNOSIS — I50.32 CHRONIC DIASTOLIC CONGESTIVE HEART FAILURE (HCC): Primary | ICD-10-CM

## 2017-09-01 DIAGNOSIS — I25.10 CORONARY ARTERY DISEASE INVOLVING NATIVE CORONARY ARTERY OF NATIVE HEART WITHOUT ANGINA PECTORIS: ICD-10-CM

## 2017-09-01 PROCEDURE — 1123F ACP DISCUSS/DSCN MKR DOCD: CPT | Performed by: INTERNAL MEDICINE

## 2017-09-01 PROCEDURE — 1036F TOBACCO NON-USER: CPT | Performed by: INTERNAL MEDICINE

## 2017-09-01 PROCEDURE — G8427 DOCREV CUR MEDS BY ELIG CLIN: HCPCS | Performed by: INTERNAL MEDICINE

## 2017-09-01 PROCEDURE — G8598 ASA/ANTIPLAT THER USED: HCPCS | Performed by: INTERNAL MEDICINE

## 2017-09-01 PROCEDURE — 4040F PNEUMOC VAC/ADMIN/RCVD: CPT | Performed by: INTERNAL MEDICINE

## 2017-09-01 PROCEDURE — 3017F COLORECTAL CA SCREEN DOC REV: CPT | Performed by: INTERNAL MEDICINE

## 2017-09-01 PROCEDURE — 99214 OFFICE O/P EST MOD 30 MIN: CPT | Performed by: INTERNAL MEDICINE

## 2017-09-01 PROCEDURE — 93000 ELECTROCARDIOGRAM COMPLETE: CPT | Performed by: INTERNAL MEDICINE

## 2017-09-01 PROCEDURE — G8417 CALC BMI ABV UP PARAM F/U: HCPCS | Performed by: INTERNAL MEDICINE

## 2017-09-01 RX ORDER — TORSEMIDE 20 MG/1
TABLET ORAL
Qty: 270 TABLET | Refills: 0
Start: 2017-09-01 | End: 2018-02-01 | Stop reason: SDUPTHER

## 2017-09-18 RX ORDER — METOPROLOL SUCCINATE 25 MG/1
TABLET, EXTENDED RELEASE ORAL
Qty: 90 TABLET | Refills: 3 | Status: SHIPPED | OUTPATIENT
Start: 2017-09-18 | End: 2018-09-18 | Stop reason: SDUPTHER

## 2017-10-05 RX ORDER — RIVAROXABAN 20 MG/1
TABLET, FILM COATED ORAL
Qty: 30 TABLET | Refills: 6 | Status: SHIPPED | OUTPATIENT
Start: 2017-10-05 | End: 2018-05-02 | Stop reason: SDUPTHER

## 2017-11-08 ENCOUNTER — TELEPHONE (OUTPATIENT)
Dept: INTERNAL MEDICINE CLINIC | Age: 72
End: 2017-11-08

## 2017-11-08 NOTE — TELEPHONE ENCOUNTER
Nurse from [facility], says pt is out of his nystatin powder and cream that was given to him at ER, and she stated he needs it and she will be doing home care for him also. Informed pt he needs to be seen, he states ,he has been sick and his grand daughter helps him out. When he gets in contact with her he will have her call next week for and appt.

## 2017-11-08 NOTE — TELEPHONE ENCOUNTER
Patient has no recent ER visit and medications are not on his list. He will need to make appt with Dr Jenny Beckford to receive prescriptions.

## 2017-12-13 ENCOUNTER — TELEPHONE (OUTPATIENT)
Dept: CARDIOLOGY CLINIC | Age: 72
End: 2017-12-13

## 2017-12-13 ENCOUNTER — TELEPHONE (OUTPATIENT)
Dept: INTERNAL MEDICINE CLINIC | Age: 72
End: 2017-12-13

## 2017-12-13 NOTE — TELEPHONE ENCOUNTER
I called the patient to relay- no answer. I called [facility] RN and left a detailed response with Dr. Danuta sanders.

## 2017-12-13 NOTE — TELEPHONE ENCOUNTER
Sindhu from [facility] is calling with a weight gain of 8 lbs over the last week for Alina Yolie. 12/6 = 250 lbs and 12/13 = 258 lbs    No increased SOB, No Edema, lungs clear.   In the last week has more chest pain than normal in the middle of his chest and last about 5 minutes and then goes awayl    [facility] can be reached at [phone]

## 2017-12-13 NOTE — TELEPHONE ENCOUNTER
Patient last seen on 9/1/17. DX: CHF, AF, CAD, HTN. [facility] [phone]. Currently taking Torsemide 20 mg 2 tabs qAM & 1 tab qPM and  Spironolactone 25 mg daily. Temp 98.4, Respirations 18, /76, pulse 86 and O2 97%. His weight went up in one week from 150 to 158lbs, no other signs or symptoms.

## 2018-01-17 ENCOUNTER — TELEPHONE (OUTPATIENT)
Dept: INTERNAL MEDICINE CLINIC | Age: 73
End: 2018-01-17

## 2018-01-17 NOTE — TELEPHONE ENCOUNTER
Janeth Salas from [address] [place] [address] calling to let you know that the patinet fell around 3:00 am this morning and has had a few falls in the last couple of days. Janeth Salas is wanting to know she she can have orders to have PT eval in home.

## 2018-02-01 ENCOUNTER — OFFICE VISIT (OUTPATIENT)
Dept: CARDIOLOGY CLINIC | Age: 73
End: 2018-02-01

## 2018-02-01 ENCOUNTER — OFFICE VISIT (OUTPATIENT)
Dept: INTERNAL MEDICINE CLINIC | Age: 73
End: 2018-02-01

## 2018-02-01 VITALS
BODY MASS INDEX: 26.52 KG/M2 | DIASTOLIC BLOOD PRESSURE: 74 MMHG | OXYGEN SATURATION: 92 % | HEART RATE: 112 BPM | SYSTOLIC BLOOD PRESSURE: 111 MMHG | RESPIRATION RATE: 18 BRPM | WEIGHT: 201 LBS

## 2018-02-01 VITALS
DIASTOLIC BLOOD PRESSURE: 70 MMHG | HEART RATE: 101 BPM | BODY MASS INDEX: 38.19 KG/M2 | SYSTOLIC BLOOD PRESSURE: 110 MMHG | OXYGEN SATURATION: 93 % | WEIGHT: 282 LBS | HEIGHT: 72 IN

## 2018-02-01 DIAGNOSIS — Z23 NEED FOR PROPHYLACTIC VACCINATION AGAINST DIPHTHERIA-TETANUS-PERTUSSIS (DTP): ICD-10-CM

## 2018-02-01 DIAGNOSIS — C83.31 DIFFUSE LARGE B-CELL LYMPHOMA OF LYMPH NODES OF NECK (HCC): ICD-10-CM

## 2018-02-01 DIAGNOSIS — I50.33 DIASTOLIC CHF, ACUTE ON CHRONIC (HCC): ICD-10-CM

## 2018-02-01 DIAGNOSIS — Z23 NEED FOR PROPHYLACTIC VACCINATION AGAINST STREPTOCOCCUS PNEUMONIAE (PNEUMOCOCCUS): ICD-10-CM

## 2018-02-01 DIAGNOSIS — I50.32 CHRONIC DIASTOLIC CONGESTIVE HEART FAILURE (HCC): ICD-10-CM

## 2018-02-01 DIAGNOSIS — I48.20 CHRONIC ATRIAL FIBRILLATION (HCC): Primary | ICD-10-CM

## 2018-02-01 DIAGNOSIS — I25.10 CORONARY ARTERY DISEASE INVOLVING NATIVE CORONARY ARTERY OF NATIVE HEART WITHOUT ANGINA PECTORIS: ICD-10-CM

## 2018-02-01 DIAGNOSIS — I10 ESSENTIAL HYPERTENSION: ICD-10-CM

## 2018-02-01 DIAGNOSIS — R53.81 DEBILITY: Primary | ICD-10-CM

## 2018-02-01 DIAGNOSIS — R60.0 BILATERAL LEG EDEMA: ICD-10-CM

## 2018-02-01 DIAGNOSIS — D50.8 IRON DEFICIENCY ANEMIA SECONDARY TO INADEQUATE DIETARY IRON INTAKE: ICD-10-CM

## 2018-02-01 DIAGNOSIS — B35.6 TINEA CRURIS: ICD-10-CM

## 2018-02-01 DIAGNOSIS — G81.91 RIGHT HEMIPARESIS (HCC): ICD-10-CM

## 2018-02-01 DIAGNOSIS — Z12.11 SCREENING FOR COLON CANCER: ICD-10-CM

## 2018-02-01 DIAGNOSIS — E66.01 MORBID OBESITY (HCC): ICD-10-CM

## 2018-02-01 DIAGNOSIS — I48.20 CHRONIC ATRIAL FIBRILLATION (HCC): ICD-10-CM

## 2018-02-01 PROCEDURE — G8417 CALC BMI ABV UP PARAM F/U: HCPCS | Performed by: NURSE PRACTITIONER

## 2018-02-01 PROCEDURE — 1123F ACP DISCUSS/DSCN MKR DOCD: CPT | Performed by: INTERNAL MEDICINE

## 2018-02-01 PROCEDURE — 3017F COLORECTAL CA SCREEN DOC REV: CPT | Performed by: NURSE PRACTITIONER

## 2018-02-01 PROCEDURE — 99215 OFFICE O/P EST HI 40 MIN: CPT | Performed by: INTERNAL MEDICINE

## 2018-02-01 PROCEDURE — 1036F TOBACCO NON-USER: CPT | Performed by: NURSE PRACTITIONER

## 2018-02-01 PROCEDURE — 4040F PNEUMOC VAC/ADMIN/RCVD: CPT | Performed by: INTERNAL MEDICINE

## 2018-02-01 PROCEDURE — G0009 ADMIN PNEUMOCOCCAL VACCINE: HCPCS | Performed by: INTERNAL MEDICINE

## 2018-02-01 PROCEDURE — G8598 ASA/ANTIPLAT THER USED: HCPCS | Performed by: INTERNAL MEDICINE

## 2018-02-01 PROCEDURE — G8427 DOCREV CUR MEDS BY ELIG CLIN: HCPCS | Performed by: INTERNAL MEDICINE

## 2018-02-01 PROCEDURE — G8484 FLU IMMUNIZE NO ADMIN: HCPCS | Performed by: INTERNAL MEDICINE

## 2018-02-01 PROCEDURE — G8417 CALC BMI ABV UP PARAM F/U: HCPCS | Performed by: INTERNAL MEDICINE

## 2018-02-01 PROCEDURE — G8484 FLU IMMUNIZE NO ADMIN: HCPCS | Performed by: NURSE PRACTITIONER

## 2018-02-01 PROCEDURE — 93000 ELECTROCARDIOGRAM COMPLETE: CPT | Performed by: NURSE PRACTITIONER

## 2018-02-01 PROCEDURE — G8598 ASA/ANTIPLAT THER USED: HCPCS | Performed by: NURSE PRACTITIONER

## 2018-02-01 PROCEDURE — 3017F COLORECTAL CA SCREEN DOC REV: CPT | Performed by: INTERNAL MEDICINE

## 2018-02-01 PROCEDURE — 4040F PNEUMOC VAC/ADMIN/RCVD: CPT | Performed by: NURSE PRACTITIONER

## 2018-02-01 PROCEDURE — 99214 OFFICE O/P EST MOD 30 MIN: CPT | Performed by: NURSE PRACTITIONER

## 2018-02-01 PROCEDURE — G8427 DOCREV CUR MEDS BY ELIG CLIN: HCPCS | Performed by: NURSE PRACTITIONER

## 2018-02-01 PROCEDURE — 1123F ACP DISCUSS/DSCN MKR DOCD: CPT | Performed by: NURSE PRACTITIONER

## 2018-02-01 PROCEDURE — 1036F TOBACCO NON-USER: CPT | Performed by: INTERNAL MEDICINE

## 2018-02-01 PROCEDURE — 90732 PPSV23 VACC 2 YRS+ SUBQ/IM: CPT | Performed by: INTERNAL MEDICINE

## 2018-02-01 RX ORDER — FERROUS SULFATE 325(65) MG
325 TABLET ORAL 2 TIMES DAILY
Qty: 60 TABLET | Refills: 11 | Status: SHIPPED | OUTPATIENT
Start: 2018-02-01

## 2018-02-01 RX ORDER — POTASSIUM CHLORIDE 20 MEQ/1
TABLET, EXTENDED RELEASE ORAL
Qty: 60 TABLET | Refills: 3
Start: 2018-02-01 | End: 2019-05-09

## 2018-02-01 RX ORDER — TORSEMIDE 20 MG/1
TABLET ORAL
Qty: 270 TABLET | Refills: 0
Start: 2018-02-01 | End: 2018-12-03

## 2018-02-01 RX ORDER — METOLAZONE 2.5 MG/1
2.5 TABLET ORAL WEEKLY
Qty: 15 TABLET | Refills: 3 | Status: SHIPPED | OUTPATIENT
Start: 2018-02-01 | End: 2019-02-07 | Stop reason: SDUPTHER

## 2018-02-01 RX ORDER — CLOTRIMAZOLE 1 %
CREAM (GRAM) TOPICAL
Qty: 1 TUBE | Refills: 1 | Status: SHIPPED | OUTPATIENT
Start: 2018-02-01 | End: 2018-12-16 | Stop reason: SDUPTHER

## 2018-02-01 NOTE — PROGRESS NOTES
Chief Complaint   Patient presents with    6 Month Follow-Up       HPI: Here for management of multiple chronic conditions as per the active problems list below, which I reviewed and updated with the patient today. States doing well with no new concerns except if noted below. Last seen in April. Had difficulty coming to office today, had to get wheelchair    I have reviewed the chart notes available from myself and other providers. I have addressed all active problems listed below, and created or updated the problems list as needed. 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       Discussed all labs, other tests, and imaging if available   Lab Results   Component Value Date    WBC 8.3 12/26/2017    HGB 11.9 (L) 12/26/2017    HCT 36.0 (L) 12/26/2017    MCV 97.4 12/26/2017     (L) 12/26/2017    LABLYMP 0.7 06/01/2017    MID 0.4 06/01/2017    GRAN 4.4 06/01/2017    LYMPHOPCT 11.6 12/26/2017    MIDPERCENT 7.7 06/01/2017    GRANULOCYTES 79.4 06/01/2017    RBC 3.69 (L) 12/26/2017    MCH 32.2 12/26/2017    MCHC 33.1 12/26/2017    RDW 15.0 12/26/2017     Lab Results   Component Value Date     02/12/2018    K 3.7 02/12/2018    CL 95 (L) 02/12/2018    CO2 31 02/12/2018    BUN 29 (H) 02/12/2018    CREATININE 0.7 (L) 02/12/2018    GLUCOSE 173 (H) 02/12/2018    CALCIUM 9.1 02/12/2018    PROT 7.0 06/01/2017    LABALBU 3.7 06/01/2017    BILITOT 1.1 06/01/2017    ALKPHOS 118 06/01/2017    AST 38 06/01/2017    ALT 31 06/01/2017    LABGLOM >60 02/12/2018    GFRAA >60 02/12/2018    AGRATIO 1.3 03/14/2017    GLOB 2.9 03/14/2017     Lab Results   Component Value Date    TRIG 109 05/15/2017    TRIG 80 12/31/2016    TRIG 60 05/05/2015     Lab Results   Component Value Date    HDL 43 05/15/2017    HDL 62 (H) 12/31/2016    HDL 35 (L) 05/05/2015     Lab Results   Component Value Date    LDLCALC 26 05/15/2017    LDLCALC 33 12/31/2016    LDLCALC 35 05/05/2015     No results found for: PSA, PSADIA  Lab Results   Component Value Date    TSH 3.70 05/04/2015     No results found for: LABA1C  No results found for: EAG    Prior to Admission medications    Medication Sig Start Date End Date Taking? Authorizing Provider   mineral oil-hydrophilic petrolatum (AQUAPHOR) ointment Apply topically twice daily as needed. 2/1/18  Yes Ankush Moreno MD   ferrous sulfate (BEST-GLORIA) 325 (65 Fe) MG tablet Take 1 tablet by mouth 2 times daily 2/1/18  Yes Ankush Moreno MD   clotrimazole (LOTRIMIN AF) 1 % cream Apply topically 2 times daily. 2/1/18  Yes Ankush Moreno MD   XARELTO 20 MG TABS tablet TAKE 1 TABLET BY MOUTH DAILY 10/5/17  Yes Yamile Guillermo MD   metoprolol succinate (TOPROL XL) 25 MG extended release tablet TAKE 1 TABLET BY MOUTH DAILY 9/18/17  Yes Fernando Licona MD   spironolactone (ALDACTONE) 25 MG tablet TAKE 1 TABLET BY MOUTH DAILY 5/19/17  Yes Fernando Licona MD   atorvastatin (LIPITOR) 20 MG tablet TAKE 1 TABLET BY MOUTH DAILY 5/10/17  Yes South Luu MD   GNP ASPIRIN LOW DOSE 81 MG EC tablet TAKE 1 TABLET BY MOUTH DAILY 5/8/17  Yes Delilah Coronel, CNP   lidocaine viscous-aluminum & magnesium hydroxide-simethicone-diphenhydrAMINE-nystatin Take 5-10 mLs by mouth every 6 hours 4 oz viscous lidocaine, 4 oz maalox, 4 oz benadryl, 4 oz mycostatin suspension, flavor to pt desire. Dispense 16 ounce bottle with 5 refills.  4/28/17  Yes Orson Meigs, MD   lisinopril (PRINIVIL;ZESTRIL) 5 MG tablet TAKE 1 TABLET BY MOUTH EVERY DAY 3/13/17  Yes Jaja Layton MD   rivaroxaban (XARELTO) 20 MG TABS tablet Take 1 tablet by mouth daily (with breakfast) 1/13/17  Yes Darci Tsai MD   miconazole (MICOTIN) 2 % powder Apply topically 2 times daily. 1/13/17  Yes El Garcia MD   Misc. Devices [facility]) MISC 1 each by Does not apply route daily 12/22/16  Yes Milena Singer MD   acetaminophen (TYLENOL) 325 MG tablet Take 650 mg by mouth as needed for Pain   Yes Historical Provider, MD   torsemide (DEMADEX) 20 MG tablet Patient to take 40 mg in the am and 40 mg in the evening.  2/1/18   Neville Ramsey CNP   metolazone (ZAROXOLYN) 2.5 MG tablet Take 1 tablet by mouth once a week 2/1/18   Neville Ramsey CNP   potassium chloride (KLOR-CON M) 20 MEQ extended release tablet Take 20 mEq tablet twice a day and take 20 mEq tablet three times a day on days that Metolazone is taken 2/1/18   Lynden Man Enzweiler, CNP       ROS  CONSTITUTIONAL: [] All Symptoms Negative  [] Fever     [] Chills     [] Weight Loss  [] Fatigue     [] Sweating     [x] Weakness  Comments:  legs    EYE: [] All Symptoms Negative  [x] Blurred Vision     [] Double Vision     [] Light Sensitivity  [] Eye Pain     [] Eye Discharge     [] Eye Redness  Comments:     GASTROINTESTINAL: [x] All Symptoms Negative  [] Heart Burn     [] Nausea     [] Vomiting  [] Abdominal Pain     Diarrhea     [] Constipation  [] Blood in Stool     [] Black Tarry Stool  Comments:     ENDO: [] All Symptoms Negative  [x] Easy Bruising     [x] Increased Thirst  Comments:     SKIN: [] All Symptoms Negative  [] Rash     [x] Itching  Comments:  Jock itch    NEUROLOGICAL: [x] All Symptoms Negative  [] Dizziness     [] Tingling     [] Tremor  [] Sensory Change     [] Speech Change     [] Focal Weakness  [] Seizures     [] LOC  Comments:     CARDIOVASCULAR: [] All Symptoms Negative  [x] Chest Pain     [] (ADRIAMYCIN) chemo IVP syringe 87 mg (Completed)    vinCRIStine (ONCOVIN) 2 mg in sodium chloride 0.9 % 50 mL chemo IVPB (Completed)    pegfilgrastim (NEULASTA) injection 6 mg (Completed)    lidocaine viscous-aluminum & magnesium hydroxide-simethicone-diphenhydrAMINE-nystatin    GNP ASPIRIN LOW DOSE 81 MG EC tablet    Debility - Primary    Chronic diastolic congestive heart failure (HCC)    Relevant Medications    furosemide (LASIX) injection 40 mg (Completed)    enoxaparin (LOVENOX) injection 100 mg (Completed)    heparin (porcine) injection 4,000 Units (Completed)    heparin (porcine) injection 4,000 Units (Completed)    heparin (porcine) injection 3,500 Units (Completed)    heparin (porcine) injection 2,000 Units (Completed)    heparin (porcine) injection 2,000 Units (Completed)    heparin (porcine) injection 2,000 Units (Completed)    acetaZOLAMIDE (DIAMOX) tablet 500 mg (Completed)    aspirin tablet 325 mg (Completed)    warfarin (COUMADIN) tablet 5 mg (Completed)    warfarin (COUMADIN) tablet 5 mg (Completed)    warfarin (COUMADIN) tablet 5 mg (Completed)    furosemide (LASIX) tablet 40 mg (Completed)    rivaroxaban (XARELTO) 20 MG TABS tablet    lisinopril (PRINIVIL;ZESTRIL) 5 MG tablet    furosemide (LASIX) injection 40 mg (Completed)    furosemide (LASIX) injection 40 mg (Completed)    GNP ASPIRIN LOW DOSE 81 MG EC tablet    atorvastatin (LIPITOR) 20 MG tablet    spironolactone (ALDACTONE) 25 MG tablet    metoprolol succinate (TOPROL XL) 25 MG extended release tablet    XARELTO 20 MG TABS tablet    torsemide (DEMADEX) 20 MG tablet    metolazone (ZAROXOLYN) 2.5 MG tablet    Chronic atrial fibrillation (HCC)    Relevant Medications    furosemide (LASIX) injection 40 mg (Completed)    enoxaparin (LOVENOX) injection 100 mg (Completed)    heparin (porcine) injection 4,000 Units (Completed)    heparin (porcine) injection 4,000 Units (Completed)    heparin (porcine) injection 3,500 Units (Completed)    heparin

## 2018-02-01 NOTE — PROGRESS NOTES
[facility]  Office Visit    Sary Villarreal  1945 February 1, 2018    CC:   Chief Complaint   Patient presents with    Atrial Fibrillation    Congestive Heart Failure    Shortness of Breath     intermittent, during PT sessions    Chest Pain     substernal,intermittent     HPI:  The patient is 71 y.o. male with a past medical history significant for DHF, HTN, A-fib, CAD, B-cell lymphoma and CVA who is here for 3 month follow up. Last seen in 9/2017 and discussed Torsemide changes given weight changes. Here for routine follow up. He has not been hospitalized this time. Last seen in ED in late 12/2017 d/t knee injury after fall. Overall doing okay. Continues to have issues with increased edema in his legs; increasing SOBOE, undergoing PT and finds it difficult d/t SOB. Has episodes of L inframammary chest pain (points one finger), occurs at rest and with activity, lasts seconds-minutes, no positional/pleuritic component; has started in last few months. Sleeps elevated in hospital bed. Denies PND, cough, palpitations, dizziness, syncope or claudication. Has issues with occasional falls (no injury but abrasions on knee). Wt gain 10-12# over last several weeks    Review of Systems:  Constitutional: + chronic fatigue and weakness improving with PT. Denies night sweats or fever. HEENT: Denies new visual changes, ringing in ears, nosebleeds, nasal congestion  Respiratory: + SOBOE  Cardiovascular: see HPI  GI: Denies N/V, diarrhea, constipation, abdominal pain, change in bowel habits, melena or hematochezia  : + urinary incontinence  Skin: Denies rash, hives, or cyanosis  Musculoskeletal: + R side weakness from prior stroke; chronic pain in R leg. Neurological: Denies syncope or TIA-like symptoms.   Psychiatric: Denies anxiety, insomnia or depression     Past Medical History:   Diagnosis Date    Atrial fibrillation (HCC)     B-cell lymphoma (HCC)     Back pain     [facility]     Cerebral infarction ([facility]     [facility]     Hypertension     Hypokalemia     Obesity      Past Surgical History:   Procedure Laterality Date    FINE NEEDLE ASPIRATION      JOINT REPLACEMENT Right 2010    right TKA Knee via Right    KNEE SURGERY      TUNNELED VENOUS PORT PLACEMENT Left 09/02/2016    DR. Zepeda Bocanegra POWER [facility]     Family History   Problem Relation Age of Onset    Alzheimer's Disease Mother      Social History   Substance Use Topics    Smoking status: Former Smoker     Packs/day: 1.00     Quit date: 5/8/2011    Smokeless tobacco: Never Used    Alcohol use No       No Known Allergies  Current Outpatient Prescriptions   Medication Sig Dispense Refill    Tdap (ADACEL) 5-2-15.5 LF-MCG/0.5 injection Inject 0.5 mLs into the muscle once for 1 dose 0.5 mL 0    mineral oil-hydrophilic petrolatum (AQUAPHOR) ointment Apply topically twice daily as needed. 1 Tube 11    ferrous sulfate (BEST-GLORIA) 325 (65 Fe) MG tablet Take 1 tablet by mouth 2 times daily 60 tablet 11    torsemide (DEMADEX) 20 MG tablet Patient to take 40 mg in the am and 40 mg in the evening.  270 tablet 0    metolazone (ZAROXOLYN) 2.5 MG tablet Take 1 tablet by mouth once a week 15 tablet 3    potassium chloride (KLOR-CON M) 20 MEQ extended release tablet Take 20 mEq tablet twice a day and take 20 mEq tablet three times a day on days that Metolazone is taken 60 tablet 3    XARELTO 20 MG TABS tablet TAKE 1 TABLET BY MOUTH DAILY 30 tablet 6    metoprolol succinate (TOPROL XL) 25 MG extended release tablet TAKE 1 TABLET BY MOUTH DAILY 90 tablet 3    spironolactone (ALDACTONE) 25 MG tablet TAKE 1 TABLET BY MOUTH DAILY 90 tablet 3    atorvastatin (LIPITOR) 20 MG tablet TAKE 1 TABLET BY MOUTH DAILY 90 tablet 3    GNP ASPIRIN LOW DOSE 81 MG EC tablet TAKE 1 TABLET BY MOUTH DAILY 90 tablet 3    lidocaine viscous-aluminum & magnesium hydroxide-simethicone-diphenhydrAMINE-nystatin Take 12/26/2017    CL 98 12/26/2017    CO2 30 12/26/2017    BUN 19 12/26/2017    CREATININE 0.7 12/26/2017    GLUCOSE 105 12/26/2017    GLUCOSE 114 06/01/2017    CALCIUM 8.6 12/26/2017      Lab Results   Component Value Date    WBC 8.3 12/26/2017    HGB 11.9 (L) 12/26/2017    HCT 36.0 (L) 12/26/2017    MCV 97.4 12/26/2017     (L) 12/26/2017     ECG 2/1/2018:   Atrial fib with     Echo 9/1/2016:   Normal LV size and systolic function. Estimated ejection fraction is 60%. Trace mitral regurgitation is present. The left atrium is mildly dilated. The RV is mildly enlarged. RV systolic function is normal .  There is mild tricuspid regurgitation with RVSP estimated at 33 mmHg. The right atrium is mildly dilated. Trivial pulmonic regurgitation present     Angiogram 11/6/2015:  1. Dominant right coronary artery with about 20% to 30% proximal right  coronary artery stenosis. 2. Patent left main trunk. 3. Patent left anterior descending artery and its branches. 4. A 40% to 50% stenosis of the obtuse marginal branch of the circumflex  artery. 5. Left ventricular systolic function within normal limit left heart  Hemodynamics.     MPI 10/13/2015: Abnormal study with reversible defects of the mid-anterolateral wall, apical lateral, apical anterior wall consistent with ischemia. There is also a  reversible defect of the basal to mid inferior wall consistent with  ischemia.  Normal LV size and systolic function.  Patient is in atrial fibrillation at baseline     Echo 5/4/2015:  Normal left ventricular size and wall thickness. Normal left ventricular systolic function with an estimated ejection fraction of 55% . No regional wall motion abnormalities. The mitral valve leaflets are slightly thickened with normal leaflet mobility. No evidence of mitral stenosis. Trivial mitral regurgitation is present. The left atrium appears dilated. Right atrial size is mildly dilated .     Assessment:    1.  Chronic atrial

## 2018-02-12 LAB
ANION GAP SERPL CALCULATED.3IONS-SCNC: 14 MMOL/L (ref 3–16)
BUN BLDV-MCNC: 29 MG/DL (ref 7–20)
CALCIUM SERPL-MCNC: 9.1 MG/DL (ref 8.3–10.6)
CHLORIDE BLD-SCNC: 95 MMOL/L (ref 99–110)
CO2: 31 MMOL/L (ref 21–32)
CREAT SERPL-MCNC: 0.7 MG/DL (ref 0.8–1.3)
GFR AFRICAN AMERICAN: >60
GFR NON-AFRICAN AMERICAN: >60
GLUCOSE BLD-MCNC: 173 MG/DL (ref 70–99)
POTASSIUM SERPL-SCNC: 3.7 MMOL/L (ref 3.5–5.1)
SODIUM BLD-SCNC: 140 MMOL/L (ref 136–145)

## 2018-02-14 ENCOUNTER — TELEPHONE (OUTPATIENT)
Dept: INTERNAL MEDICINE CLINIC | Age: 73
End: 2018-02-14

## 2018-02-14 NOTE — TELEPHONE ENCOUNTER
Bandar from [address] [place], [place] [address] called and stated patient still needs more Pt. [facility] is going to send orders to be signed for extended Pt.

## 2018-03-01 ENCOUNTER — OFFICE VISIT (OUTPATIENT)
Dept: ORTHOPEDIC SURGERY | Age: 73
End: 2018-03-01

## 2018-03-01 VITALS
RESPIRATION RATE: 17 BRPM | BODY MASS INDEX: 38.46 KG/M2 | DIASTOLIC BLOOD PRESSURE: 86 MMHG | SYSTOLIC BLOOD PRESSURE: 134 MMHG | HEART RATE: 73 BPM | HEIGHT: 71 IN | WEIGHT: 274.69 LBS

## 2018-03-01 DIAGNOSIS — G81.91 RIGHT HEMIPARESIS (HCC): ICD-10-CM

## 2018-03-01 DIAGNOSIS — S80.01XA CONTUSION OF RIGHT KNEE, INITIAL ENCOUNTER: Primary | ICD-10-CM

## 2018-03-01 DIAGNOSIS — M24.561 FLEXION CONTRACTURE OF RIGHT KNEE: ICD-10-CM

## 2018-03-01 PROCEDURE — G8598 ASA/ANTIPLAT THER USED: HCPCS | Performed by: ORTHOPAEDIC SURGERY

## 2018-03-01 PROCEDURE — G8417 CALC BMI ABV UP PARAM F/U: HCPCS | Performed by: ORTHOPAEDIC SURGERY

## 2018-03-01 PROCEDURE — 4040F PNEUMOC VAC/ADMIN/RCVD: CPT | Performed by: ORTHOPAEDIC SURGERY

## 2018-03-01 PROCEDURE — 3017F COLORECTAL CA SCREEN DOC REV: CPT | Performed by: ORTHOPAEDIC SURGERY

## 2018-03-01 PROCEDURE — 1123F ACP DISCUSS/DSCN MKR DOCD: CPT | Performed by: ORTHOPAEDIC SURGERY

## 2018-03-01 PROCEDURE — G8484 FLU IMMUNIZE NO ADMIN: HCPCS | Performed by: ORTHOPAEDIC SURGERY

## 2018-03-01 PROCEDURE — G8427 DOCREV CUR MEDS BY ELIG CLIN: HCPCS | Performed by: ORTHOPAEDIC SURGERY

## 2018-03-01 PROCEDURE — 1036F TOBACCO NON-USER: CPT | Performed by: ORTHOPAEDIC SURGERY

## 2018-03-01 PROCEDURE — 99203 OFFICE O/P NEW LOW 30 MIN: CPT | Performed by: ORTHOPAEDIC SURGERY

## 2018-03-01 NOTE — PROGRESS NOTES
chronic flexion contracture. I do not really think that there is much that can be done to resolve this now that it has been present for 13 years. By this point, he has well developed scar tissue and his tendons, ligaments, and muscles will all be contracted. I do not think that surgical release is really a viable option. I discussed this with the patient and family. However, I am not a arthroplasty surgeon. He is welcome to get a second opinion and I have provided him with info for Dr. Bonnie Ugalde should he wish to see him.

## 2018-03-15 ENCOUNTER — OFFICE VISIT (OUTPATIENT)
Dept: CARDIOLOGY CLINIC | Age: 73
End: 2018-03-15

## 2018-03-15 VITALS
OXYGEN SATURATION: 94 % | HEIGHT: 70 IN | HEART RATE: 100 BPM | DIASTOLIC BLOOD PRESSURE: 68 MMHG | SYSTOLIC BLOOD PRESSURE: 120 MMHG | BODY MASS INDEX: 40.23 KG/M2 | WEIGHT: 281 LBS

## 2018-03-15 DIAGNOSIS — I48.20 CHRONIC ATRIAL FIBRILLATION (HCC): Primary | ICD-10-CM

## 2018-03-15 DIAGNOSIS — R60.0 BILATERAL LEG EDEMA: ICD-10-CM

## 2018-03-15 DIAGNOSIS — I50.32 CHRONIC DIASTOLIC CONGESTIVE HEART FAILURE (HCC): ICD-10-CM

## 2018-03-15 DIAGNOSIS — I25.10 CORONARY ARTERY DISEASE INVOLVING NATIVE CORONARY ARTERY OF NATIVE HEART WITHOUT ANGINA PECTORIS: ICD-10-CM

## 2018-03-15 DIAGNOSIS — I10 ESSENTIAL HYPERTENSION: ICD-10-CM

## 2018-03-15 PROCEDURE — 1036F TOBACCO NON-USER: CPT | Performed by: NURSE PRACTITIONER

## 2018-03-15 PROCEDURE — G8598 ASA/ANTIPLAT THER USED: HCPCS | Performed by: NURSE PRACTITIONER

## 2018-03-15 PROCEDURE — G8484 FLU IMMUNIZE NO ADMIN: HCPCS | Performed by: NURSE PRACTITIONER

## 2018-03-15 PROCEDURE — 1123F ACP DISCUSS/DSCN MKR DOCD: CPT | Performed by: NURSE PRACTITIONER

## 2018-03-15 PROCEDURE — 4040F PNEUMOC VAC/ADMIN/RCVD: CPT | Performed by: NURSE PRACTITIONER

## 2018-03-15 PROCEDURE — G8427 DOCREV CUR MEDS BY ELIG CLIN: HCPCS | Performed by: NURSE PRACTITIONER

## 2018-03-15 PROCEDURE — 3017F COLORECTAL CA SCREEN DOC REV: CPT | Performed by: NURSE PRACTITIONER

## 2018-03-15 PROCEDURE — G8417 CALC BMI ABV UP PARAM F/U: HCPCS | Performed by: NURSE PRACTITIONER

## 2018-03-15 PROCEDURE — 99213 OFFICE O/P EST LOW 20 MIN: CPT | Performed by: NURSE PRACTITIONER

## 2018-03-16 NOTE — COMMUNICATION BODY
months (around 6/15/2018) for 3-4 months with Dr. Annalise Sanchez or D. Enzweiler, CNP. Thanks for allowing me to participate in the care of this patient.

## 2018-04-17 ENCOUNTER — TELEPHONE (OUTPATIENT)
Dept: INTERNAL MEDICINE CLINIC | Age: 73
End: 2018-04-17

## 2018-04-18 RX ORDER — POTASSIUM CHLORIDE 20 MEQ/1
TABLET, EXTENDED RELEASE ORAL
Qty: 180 TABLET | Refills: 0 | Status: SHIPPED | OUTPATIENT
Start: 2018-04-18 | End: 2018-06-20 | Stop reason: SDUPTHER

## 2018-05-10 DIAGNOSIS — I25.10 CORONARY ARTERY DISEASE INVOLVING NATIVE CORONARY ARTERY OF NATIVE HEART WITHOUT ANGINA PECTORIS: ICD-10-CM

## 2018-05-10 RX ORDER — ASPIRIN 81 MG
TABLET, DELAYED RELEASE (ENTERIC COATED) ORAL
Qty: 90 TABLET | Refills: 1 | Status: SHIPPED | OUTPATIENT
Start: 2018-05-10 | End: 2018-11-06 | Stop reason: SDUPTHER

## 2018-05-14 RX ORDER — ATORVASTATIN CALCIUM 20 MG/1
TABLET, FILM COATED ORAL
Qty: 90 TABLET | Refills: 3 | Status: SHIPPED | OUTPATIENT
Start: 2018-05-14 | End: 2019-07-12 | Stop reason: SDUPTHER

## 2018-06-20 ENCOUNTER — OFFICE VISIT (OUTPATIENT)
Dept: CARDIOLOGY CLINIC | Age: 73
End: 2018-06-20

## 2018-06-20 VITALS
WEIGHT: 281 LBS | OXYGEN SATURATION: 94 % | HEIGHT: 73 IN | BODY MASS INDEX: 37.24 KG/M2 | DIASTOLIC BLOOD PRESSURE: 70 MMHG | HEART RATE: 84 BPM | SYSTOLIC BLOOD PRESSURE: 116 MMHG

## 2018-06-20 DIAGNOSIS — I48.20 CHRONIC ATRIAL FIBRILLATION (HCC): Primary | ICD-10-CM

## 2018-06-20 DIAGNOSIS — R60.0 BILATERAL LEG EDEMA: ICD-10-CM

## 2018-06-20 DIAGNOSIS — I50.32 CHRONIC DIASTOLIC CONGESTIVE HEART FAILURE (HCC): ICD-10-CM

## 2018-06-20 DIAGNOSIS — I10 ESSENTIAL HYPERTENSION: ICD-10-CM

## 2018-06-20 DIAGNOSIS — I25.10 CORONARY ARTERY DISEASE INVOLVING NATIVE CORONARY ARTERY OF NATIVE HEART WITHOUT ANGINA PECTORIS: ICD-10-CM

## 2018-06-20 PROCEDURE — G8598 ASA/ANTIPLAT THER USED: HCPCS | Performed by: NURSE PRACTITIONER

## 2018-06-20 PROCEDURE — 1123F ACP DISCUSS/DSCN MKR DOCD: CPT | Performed by: NURSE PRACTITIONER

## 2018-06-20 PROCEDURE — G8417 CALC BMI ABV UP PARAM F/U: HCPCS | Performed by: NURSE PRACTITIONER

## 2018-06-20 PROCEDURE — 3017F COLORECTAL CA SCREEN DOC REV: CPT | Performed by: NURSE PRACTITIONER

## 2018-06-20 PROCEDURE — 1036F TOBACCO NON-USER: CPT | Performed by: NURSE PRACTITIONER

## 2018-06-20 PROCEDURE — 93000 ELECTROCARDIOGRAM COMPLETE: CPT | Performed by: NURSE PRACTITIONER

## 2018-06-20 PROCEDURE — 4040F PNEUMOC VAC/ADMIN/RCVD: CPT | Performed by: NURSE PRACTITIONER

## 2018-06-20 PROCEDURE — G8427 DOCREV CUR MEDS BY ELIG CLIN: HCPCS | Performed by: NURSE PRACTITIONER

## 2018-06-20 PROCEDURE — 99214 OFFICE O/P EST MOD 30 MIN: CPT | Performed by: NURSE PRACTITIONER

## 2018-07-05 RX ORDER — SPIRONOLACTONE 25 MG/1
TABLET ORAL
Qty: 90 TABLET | Refills: 0 | Status: SHIPPED | OUTPATIENT
Start: 2018-07-05 | End: 2018-10-31 | Stop reason: SDUPTHER

## 2018-07-10 RX ORDER — TORSEMIDE 20 MG/1
TABLET ORAL
Qty: 360 TABLET | Refills: 0 | Status: SHIPPED | OUTPATIENT
Start: 2018-07-10 | End: 2018-10-01 | Stop reason: SDUPTHER

## 2018-07-10 RX ORDER — POTASSIUM CHLORIDE 20 MEQ/1
TABLET, EXTENDED RELEASE ORAL
Qty: 180 TABLET | Refills: 1 | Status: SHIPPED | OUTPATIENT
Start: 2018-07-10 | End: 2018-12-31 | Stop reason: SDUPTHER

## 2018-09-19 RX ORDER — METOPROLOL SUCCINATE 25 MG/1
TABLET, EXTENDED RELEASE ORAL
Qty: 90 TABLET | Refills: 3 | Status: ON HOLD | OUTPATIENT
Start: 2018-09-19 | End: 2020-01-02 | Stop reason: HOSPADM

## 2018-10-02 RX ORDER — TORSEMIDE 20 MG/1
TABLET ORAL
Qty: 120 TABLET | Refills: 3 | Status: SHIPPED | OUTPATIENT
Start: 2018-10-02 | End: 2018-10-02 | Stop reason: SDUPTHER

## 2018-10-04 RX ORDER — TORSEMIDE 20 MG/1
TABLET ORAL
Qty: 360 TABLET | Refills: 3 | Status: SHIPPED | OUTPATIENT
Start: 2018-10-04 | End: 2019-05-22 | Stop reason: SDUPTHER

## 2018-10-31 RX ORDER — SPIRONOLACTONE 25 MG/1
TABLET ORAL
Qty: 90 TABLET | Refills: 1 | Status: SHIPPED | OUTPATIENT
Start: 2018-10-31 | End: 2018-12-03

## 2018-11-06 DIAGNOSIS — I25.10 CORONARY ARTERY DISEASE INVOLVING NATIVE CORONARY ARTERY OF NATIVE HEART WITHOUT ANGINA PECTORIS: ICD-10-CM

## 2018-11-06 RX ORDER — ASPIRIN 81 MG/1
TABLET, COATED ORAL
Qty: 90 TABLET | Refills: 2 | Status: ON HOLD | OUTPATIENT
Start: 2018-11-06 | End: 2019-12-06 | Stop reason: HOSPADM

## 2018-11-28 RX ORDER — RIVAROXABAN 20 MG/1
TABLET, FILM COATED ORAL
Qty: 30 TABLET | Refills: 3 | Status: SHIPPED | OUTPATIENT
Start: 2018-11-28 | End: 2019-05-22 | Stop reason: SDUPTHER

## 2018-12-03 ENCOUNTER — OFFICE VISIT (OUTPATIENT)
Dept: CARDIOLOGY CLINIC | Age: 73
End: 2018-12-03
Payer: MEDICARE

## 2018-12-03 VITALS
DIASTOLIC BLOOD PRESSURE: 66 MMHG | SYSTOLIC BLOOD PRESSURE: 120 MMHG | HEART RATE: 88 BPM | HEIGHT: 73 IN | OXYGEN SATURATION: 96 % | BODY MASS INDEX: 36.05 KG/M2 | WEIGHT: 272 LBS

## 2018-12-03 DIAGNOSIS — I48.20 CHRONIC ATRIAL FIBRILLATION (HCC): Primary | ICD-10-CM

## 2018-12-03 DIAGNOSIS — I50.32 CHRONIC DIASTOLIC CHF (CONGESTIVE HEART FAILURE), NYHA CLASS 2 (HCC): ICD-10-CM

## 2018-12-03 DIAGNOSIS — I25.10 CORONARY ARTERY DISEASE INVOLVING NATIVE CORONARY ARTERY OF NATIVE HEART WITHOUT ANGINA PECTORIS: ICD-10-CM

## 2018-12-03 DIAGNOSIS — I10 ESSENTIAL HYPERTENSION: ICD-10-CM

## 2018-12-03 PROCEDURE — 99214 OFFICE O/P EST MOD 30 MIN: CPT | Performed by: INTERNAL MEDICINE

## 2018-12-03 PROCEDURE — G8484 FLU IMMUNIZE NO ADMIN: HCPCS | Performed by: INTERNAL MEDICINE

## 2018-12-03 PROCEDURE — 1036F TOBACCO NON-USER: CPT | Performed by: INTERNAL MEDICINE

## 2018-12-03 PROCEDURE — 4040F PNEUMOC VAC/ADMIN/RCVD: CPT | Performed by: INTERNAL MEDICINE

## 2018-12-03 PROCEDURE — 3017F COLORECTAL CA SCREEN DOC REV: CPT | Performed by: INTERNAL MEDICINE

## 2018-12-03 PROCEDURE — G8598 ASA/ANTIPLAT THER USED: HCPCS | Performed by: INTERNAL MEDICINE

## 2018-12-03 PROCEDURE — 1101F PT FALLS ASSESS-DOCD LE1/YR: CPT | Performed by: INTERNAL MEDICINE

## 2018-12-03 PROCEDURE — 93000 ELECTROCARDIOGRAM COMPLETE: CPT | Performed by: INTERNAL MEDICINE

## 2018-12-03 PROCEDURE — 1123F ACP DISCUSS/DSCN MKR DOCD: CPT | Performed by: INTERNAL MEDICINE

## 2018-12-03 PROCEDURE — G8427 DOCREV CUR MEDS BY ELIG CLIN: HCPCS | Performed by: INTERNAL MEDICINE

## 2018-12-03 PROCEDURE — G8417 CALC BMI ABV UP PARAM F/U: HCPCS | Performed by: INTERNAL MEDICINE

## 2018-12-03 NOTE — PROGRESS NOTES
cyanosis  Musculoskeletal: + R side weakness from prior stroke  Neurological: Denies syncope or TIA-like symptoms. Psychiatric: Denies anxiety, insomnia or depression     Past Medical History:   Diagnosis Date    [facility]     B-cell lymphoma (HCC)     Back pain     Cancer [facility]     Cerebral infarction ([facility]     CHF (congestive heart failure) ([facility]     Hypertension     Hypokalemia     Infestation by bed bug     2018    Obesity      Past Surgical History:   Procedure Laterality Date    FINE NEEDLE ASPIRATION      JOINT REPLACEMENT Right 2010    right TKA Knee via Right    KNEE SURGERY      TUNNELED VENOUS PORT PLACEMENT Left 09/02/2016    DR. Jose Laureano POWER PORT     Family History   Problem Relation Age of Onset    Alzheimer's Disease Mother      Social History   Substance Use Topics    Smoking status: Former Smoker     Packs/day: 1.00     Quit date: 5/8/2011    Smokeless tobacco: Never Used    Alcohol use No       No Known Allergies  Current Outpatient Prescriptions   Medication Sig Dispense Refill    XARELTO 20 MG TABS tablet TAKE 1 TABLET BY MOUTH DAILY 30 tablet 3    ASPIRIN LOW DOSE 81 MG EC tablet TAKE 1 TABLET BY MOUTH DAILY 90 tablet 2    torsemide (DEMADEX) 20 MG tablet TAKE 2 TABLETS BY MOUTH TWICE DAILY 360 tablet 3    metoprolol succinate (TOPROL XL) 25 MG extended release tablet TAKE 1 TABLET BY MOUTH DAILY 90 tablet 3    potassium chloride (KLOR-CON M) 20 MEQ extended release tablet TAKE 1 TABLET BY MOUTH TWICE DAILY 180 tablet 1    atorvastatin (LIPITOR) 20 MG tablet TAKE 1 TABLET BY MOUTH DAILY 90 tablet 3    mineral oil-hydrophilic petrolatum (AQUAPHOR) ointment Apply topically twice daily as needed.  1 Tube 11    ferrous sulfate (BEST-GLORIA) 325 (65 Fe) MG tablet Take 1 tablet by mouth 2 times daily 60 tablet 11    metolazone (ZAROXOLYN) 2.5 MG tablet Take 1 tablet by mouth once a week 15 tablet 3    potassium chloride (KLOR-CON M) 20 MEQ

## 2018-12-16 DIAGNOSIS — B35.6 TINEA CRURIS: ICD-10-CM

## 2018-12-17 RX ORDER — CLOTRIMAZOLE 1 %
CREAM (GRAM) TOPICAL
Qty: 15 G | Refills: 0 | Status: ON HOLD | OUTPATIENT
Start: 2018-12-17 | End: 2019-12-09 | Stop reason: HOSPADM

## 2019-01-01 RX ORDER — POTASSIUM CHLORIDE 20 MEQ/1
TABLET, EXTENDED RELEASE ORAL
Qty: 180 TABLET | Refills: 2 | Status: SHIPPED | OUTPATIENT
Start: 2019-01-01 | End: 2019-05-22 | Stop reason: SDUPTHER

## 2019-01-30 DIAGNOSIS — D50.8 IRON DEFICIENCY ANEMIA SECONDARY TO INADEQUATE DIETARY IRON INTAKE: ICD-10-CM

## 2019-01-31 RX ORDER — FERROUS SULFATE 325(65) MG
TABLET ORAL
Qty: 60 TABLET | Refills: 0 | OUTPATIENT
Start: 2019-01-31

## 2019-02-07 DIAGNOSIS — I50.33 DIASTOLIC CHF, ACUTE ON CHRONIC (HCC): ICD-10-CM

## 2019-02-07 DIAGNOSIS — R60.0 BILATERAL LEG EDEMA: ICD-10-CM

## 2019-02-07 RX ORDER — METOLAZONE 2.5 MG/1
2.5 TABLET ORAL WEEKLY
Qty: 15 TABLET | Refills: 3 | Status: SHIPPED | OUTPATIENT
Start: 2019-02-07 | End: 2019-04-08

## 2019-03-08 ENCOUNTER — APPOINTMENT (OUTPATIENT)
Dept: GENERAL RADIOLOGY | Age: 74
End: 2019-03-08
Payer: MEDICARE

## 2019-03-08 ENCOUNTER — HOSPITAL ENCOUNTER (EMERGENCY)
Age: 74
Discharge: HOME | End: 2019-03-08
Attending: EMERGENCY MEDICINE
Payer: MEDICARE

## 2019-03-08 VITALS
RESPIRATION RATE: 17 BRPM | TEMPERATURE: 98.6 F | WEIGHT: 289.9 LBS | SYSTOLIC BLOOD PRESSURE: 113 MMHG | BODY MASS INDEX: 38.42 KG/M2 | HEART RATE: 91 BPM | OXYGEN SATURATION: 96 % | DIASTOLIC BLOOD PRESSURE: 46 MMHG | HEIGHT: 73 IN
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
## 2019-03-08 DIAGNOSIS — R53.83 OTHER FATIGUE: Primary | ICD-10-CM

## 2019-03-08 DIAGNOSIS — S91.119A LACERATION OF TOE OF LEFT FOOT WITHOUT FOREIGN BODY PRESENT OR DAMAGE TO NAIL, UNSPECIFIED TOE, INITIAL ENCOUNTER: ICD-10-CM

## 2019-03-08 LAB
A/G RATIO: 1 (ref 1.1–2.2)
ALBUMIN SERPL-MCNC: 3.7 G/DL (ref 3.4–5)
ALP BLD-CCNC: 81 U/L (ref 40–129)
ALT SERPL-CCNC: 25 U/L (ref 10–40)
ANION GAP SERPL CALCULATED.3IONS-SCNC: 11 MMOL/L (ref 3–16)
AST SERPL-CCNC: 31 U/L (ref 15–37)
BASOPHILS ABSOLUTE: 0 K/UL (ref 0–0.2)
BASOPHILS RELATIVE PERCENT: 0.1 %
BILIRUB SERPL-MCNC: 0.9 MG/DL (ref 0–1)
BILIRUBIN URINE: NEGATIVE
BLOOD, URINE: NEGATIVE
BUN BLDV-MCNC: 19 MG/DL (ref 7–20)
CALCIUM SERPL-MCNC: 9.2 MG/DL (ref 8.3–10.6)
CHLORIDE BLD-SCNC: 92 MMOL/L (ref 99–110)
CLARITY: CLEAR
CO2: 32 MMOL/L (ref 21–32)
COLOR: YELLOW
CREAT SERPL-MCNC: 0.8 MG/DL (ref 0.8–1.3)
EOSINOPHILS ABSOLUTE: 0.1 K/UL (ref 0–0.6)
EOSINOPHILS RELATIVE PERCENT: 0.9 %
GFR AFRICAN AMERICAN: >60
GFR NON-AFRICAN AMERICAN: >60
GLOBULIN: 3.8 G/DL
GLUCOSE BLD-MCNC: 118 MG/DL (ref 70–99)
GLUCOSE URINE: NEGATIVE MG/DL
HCT VFR BLD CALC: 36 % (ref 40.5–52.5)
HEMOGLOBIN: 11.9 G/DL (ref 13.5–17.5)
KETONES, URINE: NEGATIVE MG/DL
LEUKOCYTE ESTERASE, URINE: NEGATIVE
LIPASE: 13 U/L (ref 13–60)
LYMPHOCYTES ABSOLUTE: 0.5 K/UL (ref 1–5.1)
LYMPHOCYTES RELATIVE PERCENT: 6.3 %
MAGNESIUM: 2.1 MG/DL (ref 1.8–2.4)
MCH RBC QN AUTO: 33.7 PG (ref 26–34)
MCHC RBC AUTO-ENTMCNC: 32.9 G/DL (ref 31–36)
MCV RBC AUTO: 102.4 FL (ref 80–100)
MICROSCOPIC EXAMINATION: NORMAL
MONOCYTES ABSOLUTE: 0.6 K/UL (ref 0–1.3)
MONOCYTES RELATIVE PERCENT: 7.8 %
NEUTROPHILS ABSOLUTE: 7 K/UL (ref 1.7–7.7)
NEUTROPHILS RELATIVE PERCENT: 84.9 %
NITRITE, URINE: NEGATIVE
PDW BLD-RTO: 14.4 % (ref 12.4–15.4)
PH UA: 7 (ref 5–8)
PLATELET # BLD: 147 K/UL (ref 135–450)
PMV BLD AUTO: 7.3 FL (ref 5–10.5)
POTASSIUM REFLEX MAGNESIUM: 3.4 MMOL/L (ref 3.5–5.1)
PROTEIN UA: NEGATIVE MG/DL
RBC # BLD: 3.52 M/UL (ref 4.2–5.9)
SODIUM BLD-SCNC: 135 MMOL/L (ref 136–145)
SPECIFIC GRAVITY UA: 1.01 (ref 1–1.03)
TOTAL PROTEIN: 7.5 G/DL (ref 6.4–8.2)
TROPONIN: <0.01 NG/ML
URINE REFLEX TO CULTURE: NORMAL
URINE TYPE: NORMAL
UROBILINOGEN, URINE: 0.2 E.U./DL
WBC # BLD: 8.3 K/UL (ref 4–11)

## 2019-03-08 PROCEDURE — 81003 URINALYSIS AUTO W/O SCOPE: CPT

## 2019-03-08 PROCEDURE — 4500000024 HC ED LEVEL 4 PROCEDURE

## 2019-03-08 PROCEDURE — 2580000003 HC RX 258: Performed by: EMERGENCY MEDICINE

## 2019-03-08 PROCEDURE — 96360 HYDRATION IV INFUSION INIT: CPT

## 2019-03-08 PROCEDURE — 6370000000 HC RX 637 (ALT 250 FOR IP): Performed by: EMERGENCY MEDICINE

## 2019-03-08 PROCEDURE — 99284 EMERGENCY DEPT VISIT MOD MDM: CPT

## 2019-03-08 PROCEDURE — 83735 ASSAY OF MAGNESIUM: CPT

## 2019-03-08 PROCEDURE — 83690 ASSAY OF LIPASE: CPT

## 2019-03-08 PROCEDURE — 73630 X-RAY EXAM OF FOOT: CPT

## 2019-03-08 PROCEDURE — 6360000002 HC RX W HCPCS: Performed by: EMERGENCY MEDICINE

## 2019-03-08 PROCEDURE — 90715 TDAP VACCINE 7 YRS/> IM: CPT | Performed by: EMERGENCY MEDICINE

## 2019-03-08 PROCEDURE — 80053 COMPREHEN METABOLIC PANEL: CPT

## 2019-03-08 PROCEDURE — 84484 ASSAY OF TROPONIN QUANT: CPT

## 2019-03-08 PROCEDURE — 85025 COMPLETE CBC W/AUTO DIFF WBC: CPT

## 2019-03-08 PROCEDURE — 90471 IMMUNIZATION ADMIN: CPT | Performed by: EMERGENCY MEDICINE

## 2019-03-08 RX ORDER — ACETAMINOPHEN 325 MG/1
650 TABLET ORAL ONCE
Status: COMPLETED | OUTPATIENT
Start: 2019-03-08 | End: 2019-03-08

## 2019-03-08 RX ORDER — CEPHALEXIN 500 MG/1
500 CAPSULE ORAL 4 TIMES DAILY
Qty: 28 CAPSULE | Refills: 0 | Status: SHIPPED | OUTPATIENT
Start: 2019-03-08 | End: 2019-03-15

## 2019-03-08 RX ORDER — 0.9 % SODIUM CHLORIDE 0.9 %
500 INTRAVENOUS SOLUTION INTRAVENOUS ONCE
Status: COMPLETED | OUTPATIENT
Start: 2019-03-08 | End: 2019-03-08

## 2019-03-08 RX ADMIN — ACETAMINOPHEN 650 MG: 325 TABLET, FILM COATED ORAL at 16:49

## 2019-03-08 RX ADMIN — SODIUM CHLORIDE 500 ML: 9 INJECTION, SOLUTION INTRAVENOUS at 11:30

## 2019-03-08 RX ADMIN — TETANUS TOXOID, REDUCED DIPHTHERIA TOXOID AND ACELLULAR PERTUSSIS VACCINE, ADSORBED 0.5 ML: 5; 2.5; 8; 8; 2.5 SUSPENSION INTRAMUSCULAR at 16:06

## 2019-03-08 ASSESSMENT — ENCOUNTER SYMPTOMS
ABDOMINAL PAIN: 0
SHORTNESS OF BREATH: 0
RHINORRHEA: 0
SORE THROAT: 0
EYE REDNESS: 0

## 2019-03-08 ASSESSMENT — PAIN SCALES - GENERAL: PAINLEVEL_OUTOF10: 4

## 2019-03-18 ENCOUNTER — APPOINTMENT (OUTPATIENT)
Dept: CT IMAGING | Age: 74
End: 2019-03-18
Payer: MEDICARE

## 2019-03-18 ENCOUNTER — APPOINTMENT (OUTPATIENT)
Dept: GENERAL RADIOLOGY | Age: 74
End: 2019-03-18
Payer: MEDICARE

## 2019-03-18 ENCOUNTER — HOSPITAL ENCOUNTER (EMERGENCY)
Age: 74
Discharge: HOME | End: 2019-03-18
Attending: EMERGENCY MEDICINE
Payer: MEDICARE

## 2019-03-18 VITALS
RESPIRATION RATE: 15 BRPM | HEART RATE: 72 BPM | HEIGHT: 73 IN | DIASTOLIC BLOOD PRESSURE: 69 MMHG | SYSTOLIC BLOOD PRESSURE: 115 MMHG | WEIGHT: 277.56 LBS | TEMPERATURE: 98.4 F | BODY MASS INDEX: 36.79 KG/M2 | OXYGEN SATURATION: 95 %

## 2019-03-18 DIAGNOSIS — Z48.02 ENCOUNTER FOR REMOVAL OF SUTURES: ICD-10-CM
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
## 2019-03-18 DIAGNOSIS — S49.91XA RIGHT SHOULDER INJURY, INITIAL ENCOUNTER: ICD-10-CM

## 2019-03-18 LAB
A/G RATIO: 1 (ref 1.1–2.2)
ALBUMIN SERPL-MCNC: 3.9 G/DL (ref 3.4–5)
ALP BLD-CCNC: 75 U/L (ref 40–129)
ALT SERPL-CCNC: 25 U/L (ref 10–40)
ANION GAP SERPL CALCULATED.3IONS-SCNC: 11 MMOL/L (ref 3–16)
AST SERPL-CCNC: 30 U/L (ref 15–37)
BASOPHILS ABSOLUTE: 0 K/UL (ref 0–0.2)
BASOPHILS RELATIVE PERCENT: 0.2 %
BILIRUB SERPL-MCNC: 0.9 MG/DL (ref 0–1)
BILIRUBIN URINE: NEGATIVE
BLOOD, URINE: ABNORMAL
BUN BLDV-MCNC: 16 MG/DL (ref 7–20)
CALCIUM SERPL-MCNC: 9 MG/DL (ref 8.3–10.6)
CHLORIDE BLD-SCNC: 102 MMOL/L (ref 99–110)
CLARITY: CLEAR
CO2: 27 MMOL/L (ref 21–32)
COLOR: ABNORMAL
CREAT SERPL-MCNC: 0.7 MG/DL (ref 0.8–1.3)
EKG ATRIAL RATE: 82 BPM
EKG DIAGNOSIS: NORMAL
EKG Q-T INTERVAL: 410 MS
EKG QRS DURATION: 76 MS
EKG QTC CALCULATION (BAZETT): 426 MS
EKG R AXIS: 74 DEGREES
EKG T AXIS: 13 DEGREES
EKG VENTRICULAR RATE: 65 BPM
EOSINOPHILS ABSOLUTE: 0.3 K/UL (ref 0–0.6)
EOSINOPHILS RELATIVE PERCENT: 3.6 %
EPITHELIAL CELLS, UA: 0 /HPF (ref 0–5)
GFR AFRICAN AMERICAN: >60
GFR NON-AFRICAN AMERICAN: >60
GLOBULIN: 4.1 G/DL
GLUCOSE BLD-MCNC: 103 MG/DL (ref 70–99)
GLUCOSE URINE: NEGATIVE MG/DL
HCT VFR BLD CALC: 40.5 % (ref 40.5–52.5)
HEMOGLOBIN: 13.2 G/DL (ref 13.5–17.5)
HYALINE CASTS: 0 /LPF (ref 0–8)
KETONES, URINE: NEGATIVE MG/DL
LEUKOCYTE ESTERASE, URINE: NEGATIVE
LYMPHOCYTES ABSOLUTE: 0.8 K/UL (ref 1–5.1)
LYMPHOCYTES RELATIVE PERCENT: 10.2 %
MCH RBC QN AUTO: 33.5 PG (ref 26–34)
MCHC RBC AUTO-ENTMCNC: 32.5 G/DL (ref 31–36)
MCV RBC AUTO: 103.2 FL (ref 80–100)
MICROSCOPIC EXAMINATION: YES
MONOCYTES ABSOLUTE: 0.5 K/UL (ref 0–1.3)
MONOCYTES RELATIVE PERCENT: 6.8 %
NEUTROPHILS ABSOLUTE: 6.3 K/UL (ref 1.7–7.7)
NEUTROPHILS RELATIVE PERCENT: 79.2 %
NITRITE, URINE: NEGATIVE
PDW BLD-RTO: 14.7 % (ref 12.4–15.4)
PH UA: 5.5 (ref 5–8)
PLATELET # BLD: 145 K/UL (ref 135–450)
PMV BLD AUTO: 7.8 FL (ref 5–10.5)
POTASSIUM REFLEX MAGNESIUM: 3.7 MMOL/L (ref 3.5–5.1)
PROTEIN UA: NEGATIVE MG/DL
RBC # BLD: 3.92 M/UL (ref 4.2–5.9)
RBC UA: 4 /HPF (ref 0–4)
SODIUM BLD-SCNC: 140 MMOL/L (ref 136–145)
SPECIFIC GRAVITY UA: 1.02 (ref 1–1.03)
TOTAL PROTEIN: 8 G/DL (ref 6.4–8.2)
TROPONIN: <0.01 NG/ML
URINE REFLEX TO CULTURE: ABNORMAL
URINE TYPE: ABNORMAL
UROBILINOGEN, URINE: 1 E.U./DL
WBC # BLD: 8 K/UL (ref 4–11)
WBC UA: 0 /HPF (ref 0–5)

## 2019-03-18 PROCEDURE — 97530 THERAPEUTIC ACTIVITIES: CPT

## 2019-03-18 PROCEDURE — 71045 X-RAY EXAM CHEST 1 VIEW: CPT

## 2019-03-18 PROCEDURE — 93005 ELECTROCARDIOGRAM TRACING: CPT | Performed by: PHYSICIAN ASSISTANT

## 2019-03-18 PROCEDURE — 85025 COMPLETE CBC W/AUTO DIFF WBC: CPT

## 2019-03-18 PROCEDURE — 81001 URINALYSIS AUTO W/SCOPE: CPT

## 2019-03-18 PROCEDURE — 84484 ASSAY OF TROPONIN QUANT: CPT

## 2019-03-18 PROCEDURE — 80053 COMPREHEN METABOLIC PANEL: CPT

## 2019-03-18 PROCEDURE — 97166 OT EVAL MOD COMPLEX 45 MIN: CPT

## 2019-03-18 PROCEDURE — 99285 EMERGENCY DEPT VISIT HI MDM: CPT

## 2019-03-18 PROCEDURE — 97162 PT EVAL MOD COMPLEX 30 MIN: CPT

## 2019-03-18 PROCEDURE — 97116 GAIT TRAINING THERAPY: CPT

## 2019-03-18 PROCEDURE — 73030 X-RAY EXAM OF SHOULDER: CPT

## 2019-03-18 PROCEDURE — 93010 ELECTROCARDIOGRAM REPORT: CPT | Performed by: INTERNAL MEDICINE

## 2019-03-18 PROCEDURE — 6370000000 HC RX 637 (ALT 250 FOR IP): Performed by: PHYSICIAN ASSISTANT

## 2019-03-18 PROCEDURE — 70450 CT HEAD/BRAIN W/O DYE: CPT

## 2019-03-18 RX ORDER — ACETAMINOPHEN 325 MG/1
650 TABLET ORAL ONCE
Status: COMPLETED | OUTPATIENT
Start: 2019-03-18 | End: 2019-03-18

## 2019-03-18 RX ADMIN — ACETAMINOPHEN 650 MG: 325 TABLET ORAL at 12:52

## 2019-03-18 ASSESSMENT — PAIN SCALES - GENERAL
PAINLEVEL_OUTOF10: 6
PAINLEVEL_OUTOF10: 1
PAINLEVEL_OUTOF10: 5
PAINLEVEL_OUTOF10: 6

## 2019-03-18 ASSESSMENT — PAIN DESCRIPTION - LOCATION
LOCATION: BACK;SHOULDER
LOCATION: BACK
LOCATION: BACK;SHOULDER
LOCATION: BACK
LOCATION: BACK;SHOULDER

## 2019-03-18 ASSESSMENT — PAIN DESCRIPTION - DESCRIPTORS
DESCRIPTORS: ACHING

## 2019-03-18 ASSESSMENT — PAIN DESCRIPTION - PAIN TYPE
TYPE: ACUTE PAIN

## 2019-03-18 ASSESSMENT — PAIN DESCRIPTION - FREQUENCY
FREQUENCY: INTERMITTENT

## 2019-03-18 ASSESSMENT — PAIN DESCRIPTION - ORIENTATION
ORIENTATION: RIGHT
ORIENTATION: MID

## 2019-04-08 ENCOUNTER — OFFICE VISIT (OUTPATIENT)
Dept: INTERNAL MEDICINE CLINIC | Age: 74
End: 2019-04-08
Payer: MEDICARE

## 2019-04-08 VITALS
OXYGEN SATURATION: 98 % | SYSTOLIC BLOOD PRESSURE: 114 MMHG | RESPIRATION RATE: 12 BRPM | HEART RATE: 79 BPM | WEIGHT: 271.6 LBS | BODY MASS INDEX: 35.83 KG/M2 | DIASTOLIC BLOOD PRESSURE: 69 MMHG

## 2019-04-08 DIAGNOSIS — I50.32 CHRONIC DIASTOLIC CONGESTIVE HEART FAILURE (HCC): ICD-10-CM

## 2019-04-08 DIAGNOSIS — I48.20 CHRONIC ATRIAL FIBRILLATION (HCC): ICD-10-CM

## 2019-04-08 DIAGNOSIS — G81.91 RIGHT HEMIPARESIS (HCC): ICD-10-CM

## 2019-04-08 DIAGNOSIS — Z79.899 LONG TERM CURRENT USE OF DIURETIC: ICD-10-CM

## 2019-04-08 DIAGNOSIS — I50.9 CHRONIC CONGESTIVE HEART FAILURE, UNSPECIFIED HEART FAILURE TYPE (HCC): ICD-10-CM

## 2019-04-08 DIAGNOSIS — Z09 HOSPITAL DISCHARGE FOLLOW-UP: Primary | ICD-10-CM

## 2019-04-08 DIAGNOSIS — D61.89 ANEMIA DUE TO OTHER BONE MARROW FAILURE (HCC): ICD-10-CM

## 2019-04-08 DIAGNOSIS — R29.6 FREQUENT FALLS: ICD-10-CM

## 2019-04-08 PROCEDURE — 1111F DSCHRG MED/CURRENT MED MERGE: CPT | Performed by: INTERNAL MEDICINE

## 2019-04-08 PROCEDURE — 99495 TRANSJ CARE MGMT MOD F2F 14D: CPT | Performed by: INTERNAL MEDICINE

## 2019-04-08 ASSESSMENT — PATIENT HEALTH QUESTIONNAIRE - PHQ9
SUM OF ALL RESPONSES TO PHQ QUESTIONS 1-9: 0
SUM OF ALL RESPONSES TO PHQ9 QUESTIONS 1 & 2: 0
1. LITTLE INTEREST OR PLEASURE IN DOING THINGS: 0
2. FEELING DOWN, DEPRESSED OR HOPELESS: 0
SUM OF ALL RESPONSES TO PHQ QUESTIONS 1-9: 0

## 2019-04-08 NOTE — PROGRESS NOTES
Post-Discharge Transitional Care Management Services or Hospital Follow Up      Deborah Mi   YOB: 1945    Date of Office Visit:  4/8/2019  Date of Hospital Admission: 3/18/19  Date of Hospital Discharge: 3/18/19  Then at [facility] until 4/6/18    Risk of hospital readmission (high >=14%.  Medium >=10%) :Readmission Risk Score: 0      Care management risk score Rising risk (score 2-5) and Complex Care (Scores >=6): 9     Non face to face  following discharge, date last encounter closed (first attempt may have been earlier): *No documented post hospital discharge outreach found in the last 14 days--not needed, pt seen within 2 business days of discharge    Call initiated 2 business days of discharge: *No response recorded in the last 14 days--not needed    Patient Active Problem List   Diagnosis    Anasarca    Cerebral infarction ([address]    Hypokalemia    Morbid obesity ([address]    CHF (congestive heart failure) ([address]    Abnormal stress test    Chronic atrial fibrillation (HCC)    Chronic diastolic congestive heart failure (HCC)    Bradycardia    Diffuse large B-cell lymphoma of lymph nodes of neck ([address]    Debility    Cerebrovascular accident (CVA) ([address]    Anemia due to bone marrow failure ([address]    Venous stasis dermatitis of both lower extremities    Patient nonadherence    Right hemiparesis ([address]    Flexion contracture of right knee    Frequent falls       No Known Allergies    Medications listed as ordered at the time of discharge from [facility] Medication Instructions CAMRON:    Printed on:05/08/19 3650   Medication Information                      acetaminophen (TYLENOL) 325 MG tablet  Take 650 mg by mouth as needed for Pain             ASPIRIN LOW DOSE 81 MG EC tablet  TAKE 1 TABLET BY MOUTH DAILY             atorvastatin (LIPITOR) 20 MG tablet  TAKE 1 TABLET BY MOUTH DAILY             clotrimazole (LOTRIMIN) 1 % cream  APPLY EXTERNALLY TO THE AFFECTED AREA TWICE DAILY             ferrous sulfate (BEST-GLORIA) 325 (65 Fe) MG tablet  Take 1 tablet by mouth 2 times daily             lisinopril (PRINIVIL;ZESTRIL) 5 MG tablet  TAKE 1 TABLET BY MOUTH EVERY DAY             metoprolol succinate (TOPROL XL) 25 MG extended release tablet  TAKE 1 TABLET BY MOUTH DAILY             miconazole (MICOTIN) 2 % powder  Apply topically 2 times daily. mineral oil-hydrophilic petrolatum (AQUAPHOR) ointment  Apply topically twice daily as needed.              potassium chloride (KLOR-CON M) 20 MEQ extended release tablet  Take 20 mEq tablet twice a day and take 20 mEq tablet three times a day on days that Metolazone is taken             potassium chloride (KLOR-CON M) 20 MEQ extended release tablet  TAKE 1 TABLET BY MOUTH TWICE DAILY             spironolactone (ALDACTONE) 25 MG tablet  TAKE 1 TABLET BY MOUTH DAILY             spironolactone (ALDACTONE) 25 MG tablet  TAKE 1 TABLET BY MOUTH DAILY             torsemide (DEMADEX) 20 MG tablet  TAKE 2 TABLETS BY MOUTH TWICE DAILY             XARELTO 20 MG TABS tablet  TAKE 1 TABLET BY MOUTH DAILY                   Medications marked \"taking\" at this time  Outpatient Medications Marked as Taking for the 4/8/19 encounter (Office Visit) with Basil Gonzalez MD   Medication Sig Dispense Refill    potassium chloride (KLOR-CON M) 20 MEQ extended release tablet TAKE 1 TABLET BY MOUTH TWICE DAILY 180 tablet 2    clotrimazole (LOTRIMIN) 1 % cream APPLY EXTERNALLY TO THE AFFECTED AREA TWICE DAILY 15 g 0    XARELTO 20 MG TABS tablet TAKE 1 TABLET BY MOUTH DAILY 30 tablet 3    ASPIRIN LOW DOSE 81 MG EC tablet TAKE 1 TABLET BY MOUTH DAILY 90 tablet 2    torsemide (DEMADEX) 20 MG tablet TAKE 2 TABLETS BY MOUTH TWICE DAILY 360 tablet 3    metoprolol succinate (TOPROL XL) 25 MG extended release tablet TAKE 1 TABLET BY MOUTH DAILY 90 tablet 3    atorvastatin (LIPITOR) 20 MG tablet TAKE 1 TABLET BY MOUTH DAILY 90 tablet 3    mineral oil-hydrophilic petrolatum (AQUAPHOR) ointment Apply topically twice daily as needed. 1 Tube 11    ferrous sulfate (BEST-GLORIA) 325 (65 Fe) MG tablet Take 1 tablet by mouth 2 times daily 60 tablet 11    potassium chloride (KLOR-CON M) 20 MEQ extended release tablet Take 20 mEq tablet twice a day and take 20 mEq tablet three times a day on days that Metolazone is taken 60 tablet 3    spironolactone (ALDACTONE) 25 MG tablet TAKE 1 TABLET BY MOUTH DAILY 90 tablet 3    lisinopril (PRINIVIL;ZESTRIL) 5 MG tablet TAKE 1 TABLET BY MOUTH EVERY DAY 90 tablet 3    miconazole (MICOTIN) 2 % powder Apply topically 2 times daily. 45 g 1        Medications patient taking as of now reconciled against medications ordered at time of hospital discharge: Yes    Chief Complaint   Patient presents with   [address] from Hospital       History of Present illness - Follow up of Hospital diagnosis(es): rehab stay after multiple falls, weaness, with ER visit 3/18 note reviewe in detail. Inpatient course: REviewed records from rehab     Interval history/Current status: states doing better little by little, hasn't fallen since ER visit    A comprehensive review of systems was negative except for what was noted in the HPI. Vitals:    04/08/19 1743   BP: 114/69   Pulse: 79   Resp: 12   SpO2: 98%   Weight: 271 lb 9.6 oz (123.2 kg)     Body mass index is 35.83 kg/m².    Wt Readings from Last 3 Encounters:   04/08/19 271 lb 9.6 oz (123.2 kg)   03/18/19 277 lb 9 oz (125.9 kg)   03/08/19 289 lb 14.5 oz (131.5 kg)     BP Readings from Last 3 Encounters:   04/08/19 114/69   03/18/19 115/69   03/08/19 (!) 113/46        Physical Exam:  General Appearance: alert and oriented to person, place and time, well developed and well- nourished, in no acute distress  Skin: warm and dry, no rash or erythema  Head: normocephalic and atraumatic  Eyes: pupils equal, round, and reactive to light, extraocular eye movements intact, conjunctivae normal  ENT: tympanic membrane, external ear and ear canal normal bilaterally, nose without deformity, nasal mucosa and turbinates normal without polyps  Neck: supple and non-tender without mass, no thyromegaly or thyroid nodules, no cervical lymphadenopathy  Pulmonary/Chest: clear to auscultation bilaterally- no wheezes, rales or rhonchi, normal air movement, no respiratory distress  Cardiovascular: normal rate, regular rhythm, normal S1 and S2, no murmurs, rubs, clicks, or gallops, distal pulses intact, no carotid bruits  Abdomen: soft, non-tender, non-distended, normal bowel sounds, no masses or organomegaly  Extremities: no cyanosis, clubbing or edema  Musculoskeletal: normal range of motion, no joint swelling, deformity or tenderness  Neurologic: reflexes normal and symmetric, no cranial nerve deficit, gait, coordination and speech normal    Assessment/Plan:  1. Hospital discharge follow-up  Reviewed home safety plans    2. Long term current use of diuretic    - RENAL FUNCTION PANEL; Future    3.  Frequent falls          Medical Decision Making: moderate complexity

## 2019-05-06 RX ORDER — SPIRONOLACTONE 25 MG/1
TABLET ORAL
Qty: 90 TABLET | Refills: 3 | Status: SHIPPED | OUTPATIENT
Start: 2019-05-06 | End: 2019-05-09

## 2019-05-08 PROBLEM — R29.6 FREQUENT FALLS: Status: ACTIVE | Noted: 2019-05-08

## 2019-05-08 NOTE — PROGRESS NOTES
[address]  1945    May 9, 2019    CC:  \"I am doing alright. \"     HPI:  The patient is 68 y.o. male with a past medical history significant for DHF, HTN, A-fib, CAD, B-cell lymphoma and CVA. Hospitalized 12/2016-1/2017 with recurrent R side weakness, slurred speech and dx with TIA. Not followed by cardiology. Went to inpt rehab after acute care and discharged on 1/14/2017. Seen by Dr. Liam Espinosa on 3/15/2017 for acute visit after being sent to ED for SOB, weight gain and edema. He received IV lasix and keflex in the ED. He was placed on daily metolazone and lasix increased d/t weight gain and edema. Since his visit his Lasix and diuretics have been adjusted. Apparently his insurance won't cover metolazone and this was not started. Today he is here for follow up for his heart failure. He is in a wheelchair and accompanied by his family. He states that his breathing has been good. He was unable to get his home care nurse to draw his labs. Patient denies exertional chest pain/pressure, dyspnea at rest, SR, PND, orthopnea, palpitations, lightheadedness, weight changes, changes in LE edema, and syncope. Patient reports compliance to his medications. Review of Systems:  Constitutional: + fatigue/weakness chronically. Denies night sweats or fever. HEENT: Denies new visual changes, ringing in ears, nosebleeds,nasal congestion  Respiratory: + SOB  Cardiovascular: see HPI  GI: Denies N/V, diarrhea, constipation, abdominal pain, change in bowel habits, melena or hematochezia  : Denies urinary frequency, urgency, incontinence, hematuria or dysuria. Skin: Denies rash, hives, or cyanosis  Musculoskeletal: + R side weakness from prior stroke  Neurological: Denies syncope or TIA-like symptoms.   Psychiatric: Denies anxiety, insomnia or depression     Past Medical History:   Diagnosis Date    [facility]rial [facility]     B-cell lymphoma (HCC)     Back pain  Cancer [facility]     Cerebral infarction ([facility]     [facility]     Hypertension     Hypokalemia     Infestation by bed bug     2018    Obesity      Past Surgical History:   Procedure Laterality Date    FINE NEEDLE ASPIRATION      JOINT REPLACEMENT Right 2010    right TKA Knee via Right    KNEE SURGERY      TUNNELED VENOUS PORT PLACEMENT Left 2016    DR. Analisa Norris POWER PORT     Family History   Problem Relation Age of Onset    Alzheimer's Disease Mother      Social History     Tobacco Use    Smoking status: Former Smoker     Packs/day: 1.00     Last attempt to quit: 2011     Years since quittin.0    Smokeless tobacco: Never Used   Substance Use Topics    Alcohol use: No     Alcohol/week: 0.0 oz    Drug use: No       No Known Allergies  Current Outpatient Medications   Medication Sig Dispense Refill    potassium chloride (KLOR-CON M) 20 MEQ extended release tablet TAKE 1 TABLET BY MOUTH TWICE DAILY 180 tablet 2    clotrimazole (LOTRIMIN) 1 % cream APPLY EXTERNALLY TO THE AFFECTED AREA TWICE DAILY 15 g 0    XARELTO 20 MG TABS tablet TAKE 1 TABLET BY MOUTH DAILY 30 tablet 3    ASPIRIN LOW DOSE 81 MG EC tablet TAKE 1 TABLET BY MOUTH DAILY 90 tablet 2    torsemide (DEMADEX) 20 MG tablet TAKE 2 TABLETS BY MOUTH TWICE DAILY 360 tablet 3    metoprolol succinate (TOPROL XL) 25 MG extended release tablet TAKE 1 TABLET BY MOUTH DAILY 90 tablet 3    atorvastatin (LIPITOR) 20 MG tablet TAKE 1 TABLET BY MOUTH DAILY 90 tablet 3    mineral oil-hydrophilic petrolatum (AQUAPHOR) ointment Apply topically twice daily as needed.  1 Tube 11    ferrous sulfate (BEST-GLORIA) 325 (65 Fe) MG tablet Take 1 tablet by mouth 2 times daily 60 tablet 11    spironolactone (ALDACTONE) 25 MG tablet TAKE 1 TABLET BY MOUTH DAILY 90 tablet 3    lisinopril (PRINIVIL;ZESTRIL) 5 MG tablet TAKE 1 TABLET BY MOUTH EVERY DAY 90 tablet 3    miconazole (MICOTIN) 2 % powder Apply topically 2 times daily. 45 g 1    acetaminophen (TYLENOL) 325 MG tablet Take 650 mg by mouth as needed for Pain       No current facility-administered medications for this visit. Physical Exam:   /60   Pulse 76 Comment: irreg  Ht 6' 1\" (1.854 m)   Wt 278 lb (126.1 kg) Comment: too unsteady to remove shoes  SpO2 98%   BMI 36.68 kg/m²   Wt Readings from Last 2 Encounters:   05/09/19 278 lb (126.1 kg)   04/08/19 271 lb 9.6 oz (123.2 kg)     Constitutional: He is oriented to person, place, and time. He appears well-developed and well-nourished. In no acute distress. HEENT: Normocephalic and atraumatic. Sclerae anicteric. No xanthelasmas. Neck: Neck supple. No JVD . Carotids without bruits. No thyromegaly present. Cardiovascular: irreg, irreg; , normal S1 and S2; soft systolic murmur  Pulmonary/Chest: Effort normal and breath sounds normal.  Crackles in left lower lobe. Abdominal: soft, nontender, nondistended. + bowel sounds; no organomegaly or bruits. Extremities: R lower leg with 2+ edema and erythema; 1+ L ankle and lower pretibial edema. Pulses are 2+ radial/carotid/dorsalis pedis and posterior tibial bilaterally. Cap refill brisk. Neurological: R side with mild weakness  Skin: Skin is warm and dry. Psychiatric: He has a normal mood and affect.  His speech is normal and behavior is normal.     Lab Review:   Lab Results   Component Value Date    TRIG 109 05/15/2017    HDL 43 05/15/2017    LDLCALC 26 05/15/2017    LABVLDL 22 05/15/2017     Lab Results   Component Value Date     03/18/2019    K 3.7 03/18/2019     03/18/2019    CO2 27 03/18/2019    BUN 16 03/18/2019    CREATININE 0.7 03/18/2019    GLUCOSE 103 03/18/2019    GLUCOSE 114 06/01/2017    CALCIUM 9.0 03/18/2019      Lab Results   Component Value Date    WBC 8.0 03/18/2019    HGB 13.2 (L) 03/18/2019    HCT 40.5 03/18/2019    .2 (H) 03/18/2019     03/18/2019       EKG Interpretation: 9/1/17, atrial mg daily. Coronary artery disease involving native coronary artery of native heart without angina pectoris. Pt denies symptoms of angina today. Patient currently on beta-blocker & statin therapy. Hyperlipidemia  Last lipid profile was 5/2017. Continue Lipitor 20 mg daily. Will repeat lipid/liver profile today. Follow up in 6 months. This note was scribed in the presence of Dr Con Vigil, by Willam Hurtado M.D.     [facility] [place] [facility], [address] [facility]  Office: [phone]  Fax: [phone]

## 2019-05-09 ENCOUNTER — OFFICE VISIT (OUTPATIENT)
Dept: CARDIOLOGY CLINIC | Age: 74
End: 2019-05-09
Payer: MEDICARE

## 2019-05-09 VITALS
BODY MASS INDEX: 36.84 KG/M2 | WEIGHT: 278 LBS | OXYGEN SATURATION: 98 % | HEART RATE: 76 BPM | HEIGHT: 73 IN | SYSTOLIC BLOOD PRESSURE: 106 MMHG | DIASTOLIC BLOOD PRESSURE: 60 MMHG

## 2019-05-09 DIAGNOSIS — I50.32 CHRONIC DIASTOLIC CHF (CONGESTIVE HEART FAILURE), NYHA CLASS 2 (HCC): ICD-10-CM

## 2019-05-09 DIAGNOSIS — E78.2 MIXED HYPERLIPIDEMIA: ICD-10-CM

## 2019-05-09 DIAGNOSIS — I25.10 CORONARY ARTERY DISEASE INVOLVING NATIVE CORONARY ARTERY OF NATIVE HEART WITHOUT ANGINA PECTORIS: ICD-10-CM

## 2019-05-09 DIAGNOSIS — I10 ESSENTIAL HYPERTENSION: ICD-10-CM

## 2019-05-09 DIAGNOSIS — I48.20 CHRONIC ATRIAL FIBRILLATION (HCC): Primary | ICD-10-CM

## 2019-05-09 LAB
ALBUMIN SERPL-MCNC: 4 G/DL (ref 3.4–5)
ALP BLD-CCNC: 96 U/L (ref 40–129)
ALT SERPL-CCNC: 22 U/L (ref 10–40)
AST SERPL-CCNC: 26 U/L (ref 15–37)
BILIRUB SERPL-MCNC: 0.8 MG/DL (ref 0–1)
BILIRUBIN DIRECT: 0.3 MG/DL (ref 0–0.3)
BILIRUBIN, INDIRECT: 0.5 MG/DL (ref 0–1)
CHOLESTEROL, FASTING: 94 MG/DL (ref 0–199)
HDLC SERPL-MCNC: 48 MG/DL (ref 40–60)
LDL CHOLESTEROL CALCULATED: 36 MG/DL
TOTAL PROTEIN: 7.7 G/DL (ref 6.4–8.2)
TRIGLYCERIDE, FASTING: 48 MG/DL (ref 0–150)
VLDLC SERPL CALC-MCNC: 10 MG/DL

## 2019-05-09 PROCEDURE — 1123F ACP DISCUSS/DSCN MKR DOCD: CPT | Performed by: INTERNAL MEDICINE

## 2019-05-09 PROCEDURE — 4040F PNEUMOC VAC/ADMIN/RCVD: CPT | Performed by: INTERNAL MEDICINE

## 2019-05-09 PROCEDURE — G8598 ASA/ANTIPLAT THER USED: HCPCS | Performed by: INTERNAL MEDICINE

## 2019-05-09 PROCEDURE — 1036F TOBACCO NON-USER: CPT | Performed by: INTERNAL MEDICINE

## 2019-05-09 PROCEDURE — 99214 OFFICE O/P EST MOD 30 MIN: CPT | Performed by: INTERNAL MEDICINE

## 2019-05-09 PROCEDURE — 3017F COLORECTAL CA SCREEN DOC REV: CPT | Performed by: INTERNAL MEDICINE

## 2019-05-09 PROCEDURE — G8427 DOCREV CUR MEDS BY ELIG CLIN: HCPCS | Performed by: INTERNAL MEDICINE

## 2019-05-09 PROCEDURE — 93000 ELECTROCARDIOGRAM COMPLETE: CPT | Performed by: INTERNAL MEDICINE

## 2019-05-09 PROCEDURE — G8417 CALC BMI ABV UP PARAM F/U: HCPCS | Performed by: INTERNAL MEDICINE

## 2019-05-09 NOTE — PATIENT INSTRUCTIONS
· You have symptoms of a stroke. These may include:  ? Sudden numbness, tingling, weakness, or loss of movement in your face, arm, or leg, especially on only one side of your body. ? Sudden vision changes. ? Sudden trouble speaking. ? Sudden confusion or trouble understanding simple statements. ? Sudden problems with walking or balance. ? A sudden, severe headache that is different from past headaches.     · You passed out (lost consciousness).    Call your doctor now or seek immediate medical care if:    · You have new or increased shortness of breath.     · You feel dizzy or lightheaded, or you feel like you may faint.     · Your heart rate becomes irregular.     · You can feel your heart flutter in your chest or skip heartbeats. Tell your doctor if these symptoms are new or worse.    Watch closely for changes in your health, and be sure to contact your doctor if you have any problems. Where can you learn more? Go to [email or web address].[email or web address] org and sign in to your [employer] account. Enter U020 in the [place] box to learn more about \"Atrial Fibrillation: Care Instructions. \"     If you do not have an account, please click on the \"Sign Up Now\" link. Current as of: July 22, 2018  Content Version: 12.0  © 7191-2709 Healthwise, Incorporated. Care instructions adapted under license by [facility] ([facility]). If you have questions about a medical condition or this instruction, always ask your healthcare professional. [place] [address] any warranty or liability for your use of this information.

## 2019-05-11 LAB
ALBUMIN SERPL-MCNC: 3.8 G/DL (ref 3.6–5.1)
BUN / CREAT RATIO: 25 (CALC) (ref 6–22)
BUN BLDV-MCNC: 22 MG/DL (ref 7–25)
CALCIUM SERPL-MCNC: 8.8 MG/DL
CHLORIDE BLD-SCNC: 101 MMOL/L (ref 98–110)
CO2: 23 MMOL/L (ref 20–32)
CREAT SERPL-MCNC: 0.87 MG/DL
GLUCOSE BLD-MCNC: 101 MG/DL (ref 65–139)
PHOSPHORUS: 3.3 MG/DL (ref 2.1–4.3)
POTASSIUM SERPL-SCNC: 4.2 MMOL/L (ref 3.5–5.3)
SODIUM BLD-SCNC: 136 MMOL/L (ref 135–146)
SPECIMEN INTEGRITY COMPROMISED: NORMAL

## 2019-05-22 RX ORDER — POTASSIUM CHLORIDE 20 MEQ/1
TABLET, EXTENDED RELEASE ORAL
Qty: 180 TABLET | Refills: 2 | Status: ON HOLD | OUTPATIENT
Start: 2019-05-22 | End: 2019-08-06 | Stop reason: SDUPTHER

## 2019-05-22 RX ORDER — LISINOPRIL 5 MG/1
TABLET ORAL
Qty: 90 TABLET | Refills: 3 | Status: ON HOLD | OUTPATIENT
Start: 2019-05-22 | End: 2019-08-06 | Stop reason: HOSPADM

## 2019-05-22 RX ORDER — TORSEMIDE 20 MG/1
TABLET ORAL
Qty: 360 TABLET | Refills: 3 | Status: ON HOLD | OUTPATIENT
Start: 2019-05-22 | End: 2019-12-09 | Stop reason: HOSPADM

## 2019-06-07 RX ORDER — SKIN PROTECTANT 44 G/100G
OINTMENT TOPICAL
Qty: 453.9 G | Refills: 0 | Status: SHIPPED | OUTPATIENT
Start: 2019-06-07

## 2019-06-07 NOTE — TELEPHONE ENCOUNTER
Requested Prescriptions     Pending Prescriptions Disp Refills    Emollient ([facility]) Tharon Brittany ointment [Pharmacy Med Name: Frank Garcia 453.9 g 0     Sig: APPLY TOPICALLY TWICE DAILY AS NEEDED   last filled 2/1/18  Last OV 4/8/19  Next OV 6/11/19

## 2019-07-12 RX ORDER — ATORVASTATIN CALCIUM 20 MG/1
TABLET, FILM COATED ORAL
Qty: 90 TABLET | Refills: 3 | Status: SHIPPED | OUTPATIENT
Start: 2019-07-12

## 2019-07-27 ENCOUNTER — APPOINTMENT (OUTPATIENT)
Dept: CT IMAGING | Age: 74
DRG: 432 | End: 2019-07-27
Payer: MEDICARE

## 2019-07-27 ENCOUNTER — HOSPITAL ENCOUNTER (INPATIENT)
Age: 74
LOS: 10 days | Discharge: NURSING, REHAB OR LONG-TERM CARE | DRG: 432 | End: 2019-08-06
Attending: EMERGENCY MEDICINE | Admitting: INTERNAL MEDICINE
Payer: MEDICARE

## 2019-07-27 ENCOUNTER — APPOINTMENT (OUTPATIENT)
Dept: GENERAL RADIOLOGY | Age: 74
DRG: 432 | End: 2019-07-27
Payer: MEDICARE

## 2019-07-27 DIAGNOSIS — J90 PLEURAL EFFUSION: ICD-10-CM

## 2019-07-27 DIAGNOSIS — R07.89 CHEST WALL PAIN: ICD-10-CM

## 2019-07-27 DIAGNOSIS — I50.9 CONGESTIVE HEART FAILURE, UNSPECIFIED HF CHRONICITY, UNSPECIFIED HEART FAILURE TYPE (HCC): Primary | ICD-10-CM

## 2019-07-27 LAB
A/G RATIO: 0.8 (ref 1.1–2.2)
ALBUMIN SERPL-MCNC: 3.2 G/DL (ref 3.4–5)
ALP BLD-CCNC: 77 U/L (ref 40–129)
ALT SERPL-CCNC: 16 U/L (ref 10–40)
AMPHETAMINE SCREEN, URINE: NORMAL
ANION GAP SERPL CALCULATED.3IONS-SCNC: 9 MMOL/L (ref 3–16)
AST SERPL-CCNC: 25 U/L (ref 15–37)
BARBITURATE SCREEN URINE: NORMAL
BASE EXCESS ARTERIAL: 9.1 MMOL/L (ref -3–3)
BASOPHILS ABSOLUTE: 0 K/UL (ref 0–0.2)
BASOPHILS RELATIVE PERCENT: 0.1 %
BENZODIAZEPINE SCREEN, URINE: NORMAL
BILIRUB SERPL-MCNC: 0.4 MG/DL (ref 0–1)
BILIRUBIN URINE: NEGATIVE
BLOOD, URINE: NEGATIVE
BUN BLDV-MCNC: 27 MG/DL (ref 7–20)
CALCIUM SERPL-MCNC: 8.8 MG/DL (ref 8.3–10.6)
CANNABINOID SCREEN URINE: NORMAL
CARBOXYHEMOGLOBIN ARTERIAL: 1.8 % (ref 0–1.5)
CHLORIDE BLD-SCNC: 94 MMOL/L (ref 99–110)
CLARITY: CLEAR
CO2: 33 MMOL/L (ref 21–32)
COCAINE METABOLITE SCREEN URINE: NORMAL
COLOR: YELLOW
CREAT SERPL-MCNC: 0.9 MG/DL (ref 0.8–1.3)
EOSINOPHILS ABSOLUTE: 0.4 K/UL (ref 0–0.6)
EOSINOPHILS RELATIVE PERCENT: 4.5 %
ETHANOL: NORMAL MG/DL (ref 0–0.08)
GFR AFRICAN AMERICAN: >60
GFR NON-AFRICAN AMERICAN: >60
GLOBULIN: 4.2 G/DL
GLUCOSE BLD-MCNC: 110 MG/DL (ref 70–99)
GLUCOSE URINE: NEGATIVE MG/DL
HCO3 ARTERIAL: 35.9 MMOL/L (ref 21–29)
HCT VFR BLD CALC: 32.4 % (ref 40.5–52.5)
HEMOGLOBIN, ART, EXTENDED: 10.7 G/DL (ref 13.5–17.5)
HEMOGLOBIN: 10.4 G/DL (ref 13.5–17.5)
KETONES, URINE: NEGATIVE MG/DL
LACTIC ACID, SEPSIS: 0.9 MMOL/L (ref 0.4–1.9)
LEUKOCYTE ESTERASE, URINE: NEGATIVE
LYMPHOCYTES ABSOLUTE: 0.8 K/UL (ref 1–5.1)
LYMPHOCYTES RELATIVE PERCENT: 7.8 %
Lab: NORMAL
MCH RBC QN AUTO: 33.6 PG (ref 26–34)
MCHC RBC AUTO-ENTMCNC: 32.1 G/DL (ref 31–36)
MCV RBC AUTO: 104.7 FL (ref 80–100)
METHADONE SCREEN, URINE: NORMAL
METHEMOGLOBIN ARTERIAL: 0.8 %
MICROSCOPIC EXAMINATION: NORMAL
MONOCYTES ABSOLUTE: 0.8 K/UL (ref 0–1.3)
MONOCYTES RELATIVE PERCENT: 8.7 %
NEUTROPHILS ABSOLUTE: 7.7 K/UL (ref 1.7–7.7)
NEUTROPHILS RELATIVE PERCENT: 78.9 %
NITRITE, URINE: NEGATIVE
O2 CONTENT ARTERIAL: 14 ML/DL
O2 SAT, ARTERIAL: 94.7 %
O2 THERAPY: ABNORMAL
OPIATE SCREEN URINE: NORMAL
OXYCODONE URINE: NORMAL
PCO2 ARTERIAL: 64.3 MMHG (ref 35–45)
PDW BLD-RTO: 14.8 % (ref 12.4–15.4)
PH ARTERIAL: 7.37 (ref 7.35–7.45)
PH UA: 5.5
PH UA: 5.5 (ref 5–8)
PHENCYCLIDINE SCREEN URINE: NORMAL
PLATELET # BLD: 231 K/UL (ref 135–450)
PMV BLD AUTO: 7.1 FL (ref 5–10.5)
PO2 ARTERIAL: 73.8 MMHG (ref 75–108)
POTASSIUM SERPL-SCNC: 4.6 MMOL/L (ref 3.5–5.1)
PRO-BNP: 2277 PG/ML (ref 0–124)
PROPOXYPHENE SCREEN: NORMAL
PROTEIN UA: NEGATIVE MG/DL
RBC # BLD: 3.09 M/UL (ref 4.2–5.9)
SODIUM BLD-SCNC: 136 MMOL/L (ref 136–145)
SPECIFIC GRAVITY UA: 1.01 (ref 1–1.03)
TCO2 ARTERIAL: 37.9 MMOL/L
TOTAL PROTEIN: 7.4 G/DL (ref 6.4–8.2)
TROPONIN: <0.01 NG/ML
URINE REFLEX TO CULTURE: NORMAL
URINE TYPE: NORMAL
UROBILINOGEN, URINE: 1 E.U./DL
WBC # BLD: 9.7 K/UL (ref 4–11)

## 2019-07-27 PROCEDURE — 94761 N-INVAS EAR/PLS OXIMETRY MLT: CPT

## 2019-07-27 PROCEDURE — 83605 ASSAY OF LACTIC ACID: CPT

## 2019-07-27 PROCEDURE — 6360000004 HC RX CONTRAST MEDICATION: Performed by: EMERGENCY MEDICINE

## 2019-07-27 PROCEDURE — 80053 COMPREHEN METABOLIC PANEL: CPT

## 2019-07-27 PROCEDURE — 36415 COLL VENOUS BLD VENIPUNCTURE: CPT

## 2019-07-27 PROCEDURE — 82803 BLOOD GASES ANY COMBINATION: CPT

## 2019-07-27 PROCEDURE — 70450 CT HEAD/BRAIN W/O DYE: CPT

## 2019-07-27 PROCEDURE — 36591 DRAW BLOOD OFF VENOUS DEVICE: CPT

## 2019-07-27 PROCEDURE — 1200000000 HC SEMI PRIVATE

## 2019-07-27 PROCEDURE — 6360000002 HC RX W HCPCS: Performed by: PHYSICIAN ASSISTANT

## 2019-07-27 PROCEDURE — 81003 URINALYSIS AUTO W/O SCOPE: CPT

## 2019-07-27 PROCEDURE — G0480 DRUG TEST DEF 1-7 CLASSES: HCPCS

## 2019-07-27 PROCEDURE — 84484 ASSAY OF TROPONIN QUANT: CPT

## 2019-07-27 PROCEDURE — 2700000000 HC OXYGEN THERAPY PER DAY

## 2019-07-27 PROCEDURE — 71045 X-RAY EXAM CHEST 1 VIEW: CPT

## 2019-07-27 PROCEDURE — 74177 CT ABD & PELVIS W/CONTRAST: CPT

## 2019-07-27 PROCEDURE — 96374 THER/PROPH/DIAG INJ IV PUSH: CPT

## 2019-07-27 PROCEDURE — 87040 BLOOD CULTURE FOR BACTERIA: CPT

## 2019-07-27 PROCEDURE — 83880 ASSAY OF NATRIURETIC PEPTIDE: CPT

## 2019-07-27 PROCEDURE — 93005 ELECTROCARDIOGRAM TRACING: CPT | Performed by: PHYSICIAN ASSISTANT

## 2019-07-27 PROCEDURE — 99285 EMERGENCY DEPT VISIT HI MDM: CPT

## 2019-07-27 PROCEDURE — 36600 WITHDRAWAL OF ARTERIAL BLOOD: CPT

## 2019-07-27 PROCEDURE — 80307 DRUG TEST PRSMV CHEM ANLYZR: CPT

## 2019-07-27 PROCEDURE — 85025 COMPLETE CBC W/AUTO DIFF WBC: CPT

## 2019-07-27 RX ORDER — SODIUM CHLORIDE 0.9 % (FLUSH) 0.9 %
10 SYRINGE (ML) INJECTION PRN
Status: DISCONTINUED | OUTPATIENT
Start: 2019-07-27 | End: 2019-08-06 | Stop reason: HOSPADM

## 2019-07-27 RX ORDER — SODIUM CHLORIDE 0.9 % (FLUSH) 0.9 %
10 SYRINGE (ML) INJECTION EVERY 12 HOURS SCHEDULED
Status: DISCONTINUED | OUTPATIENT
Start: 2019-07-28 | End: 2019-08-06 | Stop reason: HOSPADM

## 2019-07-27 RX ORDER — FUROSEMIDE 10 MG/ML
20 INJECTION INTRAMUSCULAR; INTRAVENOUS ONCE
Status: COMPLETED | OUTPATIENT
Start: 2019-07-27 | End: 2019-07-27

## 2019-07-27 RX ORDER — METOPROLOL SUCCINATE 25 MG/1
25 TABLET, EXTENDED RELEASE ORAL DAILY
Status: DISCONTINUED | OUTPATIENT
Start: 2019-07-28 | End: 2019-08-06 | Stop reason: HOSPADM

## 2019-07-27 RX ORDER — ONDANSETRON 2 MG/ML
4 INJECTION INTRAMUSCULAR; INTRAVENOUS EVERY 6 HOURS PRN
Status: DISCONTINUED | OUTPATIENT
Start: 2019-07-27 | End: 2019-08-06 | Stop reason: HOSPADM

## 2019-07-27 RX ORDER — ASPIRIN 81 MG/1
81 TABLET ORAL DAILY
Status: DISCONTINUED | OUTPATIENT
Start: 2019-07-28 | End: 2019-08-06 | Stop reason: HOSPADM

## 2019-07-27 RX ADMIN — IOPAMIDOL 75 ML: 755 INJECTION, SOLUTION INTRAVENOUS at 21:01

## 2019-07-27 RX ADMIN — FUROSEMIDE 20 MG: 10 INJECTION, SOLUTION INTRAMUSCULAR; INTRAVENOUS at 22:32

## 2019-07-27 ASSESSMENT — PAIN SCALES - GENERAL
PAINLEVEL_OUTOF10: 0
PAINLEVEL_OUTOF10: 0

## 2019-07-27 ASSESSMENT — ENCOUNTER SYMPTOMS
COUGH: 1
VOMITING: 0
SHORTNESS OF BREATH: 1
BACK PAIN: 0
ABDOMINAL PAIN: 0

## 2019-07-27 NOTE — ED PROVIDER NOTES
ALLERGIES     Patient has no known allergies. FAMILY HISTORY           Problem Relation Age of Onset    Alzheimer's Disease Mother      Family Status   Relation Name Status    Mother  Alive    Father          SOCIAL HISTORY      reports that he quit smoking about 8 years ago. He smoked 1.00 pack per day. He has never used smokeless tobacco. He reports that he does not drink alcohol or use drugs. PHYSICAL EXAM    (up to 7 for level 4, 8 or more for level 5)     ED Triage Vitals [19 1847]   BP Temp Temp Source Pulse Resp SpO2 Height Weight   (!) 112/59 98.5 °F (36.9 °C) Oral 84 20 (!) 89 % 6' 1\" (1.854 m) 273 lb 13 oz (124.2 kg)     Physical Exam   Constitutional: He appears well-developed and well-nourished. No distress. HENT:   Head: Normocephalic and atraumatic. Eyes: Pupils are equal, round, and reactive to light. Neck: Normal range of motion. Cardiovascular: Normal rate. An irregularly irregular rhythm present. Pulmonary/Chest: Effort normal. He has wheezes. He has rales. Musculoskeletal:   Chronic right-sided deficit from stroke in 2015   Neurological: He is alert. Skin: Skin is warm. Psychiatric: He has a normal mood and affect. His behavior is normal.   Nursing note and vitals reviewed. DIAGNOSTIC RESULTS     EKG: All EKG's are interpreted by GUADALUPE Sotomayor in the absence of a cardiologist.    EKG interpreted by myself - please refer to attending physician's note for complete EKG interpretation:    No evidence of acute ischemia or injury. RADIOLOGY:   Non-plain film images such as CT, Ultrasound and MRI are read by the radiologist. Plain radiographic images are visualized and preliminarily interpreted by GUADALUPE Sotomayor with the below findings:    Reviewed radiologist dictation. Interpretation per the Radiologist below, if available at the time of this note:    CT CHEST ABDOMEN PELVIS W CONTRAST   Final Result   Moderate right pleural effusion.   No understand him although his speech is garbled. She states it is been like that least since yesterday but she thinks is been going on for a week the patient states is been going on for a week. He had a mechanical fall 2 to 3 days ago trying to transfer himself from the wheelchair. He has bruising to his right lower chest and right upper abdomen on Xarelto. No evidence of traumatic abnormality. Results discussed with family and patient. He is hypoxic with evidence of pleural effusion and will be given a low-dose of Lasix due to his low blood pressure around 583 systolic and will consult hospitalist for admission. CONSULTS:  IP CONSULT TO HOSPITALIST    PROCEDURES:  None    FINAL IMPRESSION      1. Congestive heart failure, unspecified HF chronicity, unspecified heart failure type ([address]    2. Pleural effusion          DISPOSITION/PLAN   DISPOSITION        PATIENT REFERRED TO:  No follow-up provider specified.     DISCHARGE MEDICATIONS:  New Prescriptions    No medications on file       (Please note that portions of this note were completed with a voice recognition program.  Efforts were made to edit the dictations but occasionally words are mis-transcribed.)    Jennifer Brewster, [address], [place]  07/27/19 1277

## 2019-07-28 ENCOUNTER — APPOINTMENT (OUTPATIENT)
Dept: GENERAL RADIOLOGY | Age: 74
DRG: 432 | End: 2019-07-28
Payer: MEDICARE

## 2019-07-28 LAB
APPEARANCE FLUID: NORMAL
BASO FLUID: 1 %
BASOPHILS ABSOLUTE: 0 K/UL (ref 0–0.2)
BASOPHILS RELATIVE PERCENT: 0 %
CELL COUNT FLUID TYPE: NORMAL
CLOT EVALUATION: NORMAL
COLOR FLUID: NORMAL
EKG ATRIAL RATE: 83 BPM
EKG DIAGNOSIS: NORMAL
EKG Q-T INTERVAL: 374 MS
EKG QRS DURATION: 84 MS
EKG QTC CALCULATION (BAZETT): 436 MS
EKG R AXIS: 69 DEGREES
EKG T AXIS: 9 DEGREES
EKG VENTRICULAR RATE: 82 BPM
EOSINOPHIL FLUID: 28 %
EOSINOPHILS ABSOLUTE: 0.6 K/UL (ref 0–0.6)
EOSINOPHILS RELATIVE PERCENT: 7.1 %
FLUID TYPE: NORMAL
GLUCOSE, FLUID: 105 MG/DL
HCT VFR BLD CALC: 32.1 % (ref 40.5–52.5)
HEMOGLOBIN: 10.3 G/DL (ref 13.5–17.5)
LACTATE DEHYDROGENASE: 162 U/L (ref 100–190)
LACTIC ACID, SEPSIS: 1 MMOL/L (ref 0.4–1.9)
LD, FLUID: 521 U/L
LYMPHOCYTES ABSOLUTE: 0.7 K/UL (ref 1–5.1)
LYMPHOCYTES RELATIVE PERCENT: 8.6 %
LYMPHOCYTES, BODY FLUID: 36 %
MACROPHAGE FLUID: 6 %
MCH RBC QN AUTO: 33.8 PG (ref 26–34)
MCHC RBC AUTO-ENTMCNC: 31.9 G/DL (ref 31–36)
MCV RBC AUTO: 106.1 FL (ref 80–100)
MONOCYTE, FLUID: 7 %
MONOCYTES ABSOLUTE: 0.8 K/UL (ref 0–1.3)
MONOCYTES RELATIVE PERCENT: 9.2 %
NEUTROPHIL, FLUID: 20 %
NEUTROPHILS ABSOLUTE: 6.4 K/UL (ref 1.7–7.7)
NEUTROPHILS RELATIVE PERCENT: 75.1 %
NUCLEATED CELLS FLUID: 1200 /CUMM
NUMBER OF CELLS COUNTED FLUID: 100
PDW BLD-RTO: 14.9 % (ref 12.4–15.4)
PLATELET # BLD: 215 K/UL (ref 135–450)
PMV BLD AUTO: 7.4 FL (ref 5–10.5)
PROTEIN FLUID: 4 G/DL
RBC # BLD: 3.03 M/UL (ref 4.2–5.9)
RBC FLUID: NORMAL /CUMM
TOTAL PROTEIN: 6.4 G/DL (ref 6.4–8.2)
VARIANT LYMPH FLUID: 2 %
VOLUME: 21 ML
WBC # BLD: 8.6 K/UL (ref 4–11)

## 2019-07-28 PROCEDURE — 6370000000 HC RX 637 (ALT 250 FOR IP): Performed by: INTERNAL MEDICINE

## 2019-07-28 PROCEDURE — 1200000000 HC SEMI PRIVATE

## 2019-07-28 PROCEDURE — 82945 GLUCOSE OTHER FLUID: CPT

## 2019-07-28 PROCEDURE — 83615 LACTATE (LD) (LDH) ENZYME: CPT

## 2019-07-28 PROCEDURE — 6360000002 HC RX W HCPCS: Performed by: NURSE PRACTITIONER

## 2019-07-28 PROCEDURE — 94760 N-INVAS EAR/PLS OXIMETRY 1: CPT

## 2019-07-28 PROCEDURE — 84157 ASSAY OF PROTEIN OTHER: CPT

## 2019-07-28 PROCEDURE — 99223 1ST HOSP IP/OBS HIGH 75: CPT | Performed by: INTERNAL MEDICINE

## 2019-07-28 PROCEDURE — 88185 FLOWCYTOMETRY/TC ADD-ON: CPT

## 2019-07-28 PROCEDURE — 36415 COLL VENOUS BLD VENIPUNCTURE: CPT

## 2019-07-28 PROCEDURE — 88184 FLOWCYTOMETRY/ TC 1 MARKER: CPT

## 2019-07-28 PROCEDURE — 87205 SMEAR GRAM STAIN: CPT

## 2019-07-28 PROCEDURE — 2700000000 HC OXYGEN THERAPY PER DAY

## 2019-07-28 PROCEDURE — 6360000002 HC RX W HCPCS: Performed by: INTERNAL MEDICINE

## 2019-07-28 PROCEDURE — 2580000003 HC RX 258: Performed by: INTERNAL MEDICINE

## 2019-07-28 PROCEDURE — 32555 ASPIRATE PLEURA W/ IMAGING: CPT | Performed by: NURSE PRACTITIONER

## 2019-07-28 PROCEDURE — 71045 X-RAY EXAM CHEST 1 VIEW: CPT

## 2019-07-28 PROCEDURE — 93010 ELECTROCARDIOGRAM REPORT: CPT | Performed by: INTERNAL MEDICINE

## 2019-07-28 PROCEDURE — 84155 ASSAY OF PROTEIN SERUM: CPT

## 2019-07-28 PROCEDURE — 0W993ZX DRAINAGE OF RIGHT PLEURAL CAVITY, PERCUTANEOUS APPROACH, DIAGNOSTIC: ICD-10-PCS | Performed by: INTERNAL MEDICINE

## 2019-07-28 PROCEDURE — 85025 COMPLETE CBC W/AUTO DIFF WBC: CPT

## 2019-07-28 PROCEDURE — 83605 ASSAY OF LACTIC ACID: CPT

## 2019-07-28 PROCEDURE — 88112 CYTOPATH CELL ENHANCE TECH: CPT

## 2019-07-28 PROCEDURE — 88305 TISSUE EXAM BY PATHOLOGIST: CPT

## 2019-07-28 PROCEDURE — 89051 BODY FLUID CELL COUNT: CPT

## 2019-07-28 PROCEDURE — 2500000003 HC RX 250 WO HCPCS: Performed by: NURSE PRACTITIONER

## 2019-07-28 PROCEDURE — 87070 CULTURE OTHR SPECIMN AEROBIC: CPT

## 2019-07-28 PROCEDURE — 2500000003 HC RX 250 WO HCPCS: Performed by: INTERNAL MEDICINE

## 2019-07-28 RX ORDER — FUROSEMIDE 10 MG/ML
40 INJECTION INTRAMUSCULAR; INTRAVENOUS ONCE
Status: COMPLETED | OUTPATIENT
Start: 2019-07-28 | End: 2019-07-28

## 2019-07-28 RX ORDER — SODIUM CHLORIDE 9 MG/ML
INJECTION, SOLUTION INTRAVENOUS CONTINUOUS
Status: DISCONTINUED | OUTPATIENT
Start: 2019-07-28 | End: 2019-08-02

## 2019-07-28 RX ORDER — LIDOCAINE HYDROCHLORIDE 10 MG/ML
10 INJECTION, SOLUTION INFILTRATION; PERINEURAL ONCE
Status: COMPLETED | OUTPATIENT
Start: 2019-07-28 | End: 2019-07-28

## 2019-07-28 RX ADMIN — MICONAZOLE NITRATE: 20 POWDER TOPICAL at 21:56

## 2019-07-28 RX ADMIN — LIDOCAINE HYDROCHLORIDE 10 ML: 10 INJECTION, SOLUTION INFILTRATION; PERINEURAL at 11:15

## 2019-07-28 RX ADMIN — SODIUM CHLORIDE: 9 INJECTION, SOLUTION INTRAVENOUS at 10:30

## 2019-07-28 RX ADMIN — MICONAZOLE NITRATE: 20 POWDER TOPICAL at 10:00

## 2019-07-28 RX ADMIN — ASPIRIN 81 MG: 81 TABLET ORAL at 10:00

## 2019-07-28 RX ADMIN — ENOXAPARIN SODIUM 40 MG: 40 INJECTION SUBCUTANEOUS at 10:00

## 2019-07-28 RX ADMIN — MICONAZOLE NITRATE: 20 POWDER TOPICAL at 02:05

## 2019-07-28 RX ADMIN — FUROSEMIDE 40 MG: 10 INJECTION, SOLUTION INTRAMUSCULAR; INTRAVENOUS at 18:51

## 2019-07-28 RX ADMIN — METOPROLOL SUCCINATE 25 MG: 25 TABLET, FILM COATED, EXTENDED RELEASE ORAL at 10:00

## 2019-07-28 RX ADMIN — SODIUM CHLORIDE, PRESERVATIVE FREE 10 ML: 5 INJECTION INTRAVENOUS at 10:00

## 2019-07-28 RX ADMIN — SODIUM CHLORIDE: 9 INJECTION, SOLUTION INTRAVENOUS at 01:12

## 2019-07-28 ASSESSMENT — PAIN SCALES - GENERAL
PAINLEVEL_OUTOF10: 0

## 2019-07-28 NOTE — CONSULTS
flush  10 mL Intravenous 2 times per day    enoxaparin  40 mg Subcutaneous Daily       Continuous Infusions:   sodium chloride 75 mL/hr at 07/28/19 0112       PRN Meds:  sodium chloride flush, magnesium hydroxide, ondansetron    ALLERGIES:  Patient has No Known Allergies. REVIEW OF SYSTEMS:  Constitutional: Negative for fever  HENT: Negative for sore throat  Eyes: Negative for redness   Respiratory: +dyspnea, -cough  Cardiovascular: Negative for chest pain  Gastrointestinal: Negative for vomiting, diarrhea   Genitourinary: Negative for hematuria   Musculoskeletal: Negative for arthralgias   Skin: Negative for rash  Neurological: Negative for syncope  Hematological: Negative for adenopathy  Psychiatric/Behavorial: Negative for anxiety    Objective:   PHYSICAL EXAM:  Blood pressure 91/64, pulse 105, temperature 97.8 °F (36.6 °C), temperature source Oral, resp. rate 20, height 6' 1\" (1.854 m), weight 259 lb 4.2 oz (117.6 kg), SpO2 95 %.'  Gen:  No acute distress. Unkempt. Eyes: PERRL. Anicteric sclera. No conjunctival injection. ENT: No discharge. Posterior oropharynx clear. Poor dentition external appearance of ears and nose normal.  Neck: Trachea midline. No mass   Resp:  No crackles. No wheezes. No rhonchi.  +Dullness on percussion. No breath sounds on right lower lung field. CV: Regular rate. Regular rhythm. No murmur or rub. No edema. GI: Soft, Non-tender. Non-distended. +BS  Skin: Warm, dry, w/o erythema. Lymph: No cervical or supraclavicular LAD. M/S: No cyanosis. No clubbing. Neuro:   +dysarthric. Moves all extremities.   Answers questions appropriately      Data Reviewed:   LABS:  CBC:   Recent Labs     07/27/19 1938 07/28/19  0643   WBC 9.7 8.6   HGB 10.4* 10.3*   HCT 32.4* 32.1*   .7* 106.1*    215     BMP:   Recent Labs     07/27/19 1938      K 4.6   CL 94*   CO2 33*   BUN 27*   CREATININE 0.9     LIVER PROFILE:   Recent Labs     07/27/19 1938   AST 25   ALT 16

## 2019-07-28 NOTE — H&P
Hospital Medicine History & Physical      PCP: Shalom Apple MD    Date of Admission: 7/27/2019    Date of Service: Pt seen/examined on 7/272019 and Admitted to Inpatient with expected LOS greater than two midnights due to medical therapy. Chief Complaint: Shortness of breath    History Of Present Illness:      69 y.o. male who presented [facility] [place] with increasing shortness of breath. Patient states he has had dyspnea over the last week. Patient states he has had recurrent falls. Patient is on Xarelto therapy for A. fib. Patient states cough has been nonproductive. He denies hemoptysis. Patient has some communication issues related to prior stroke. He has right hemiparesis at baseline      Past Medical History:          Diagnosis Date    Atrial fibrillation (HCC)     B-cell lymphoma (HCC)     Back pain     Cancer (HCC)     Cerebral infarction ([address]     CH[facility] (congestive [facility] failure) ([facility]     Hypertension     Hypokalemia     Infestation by bed bug     2018    Obesity        Past Surgical History:          Procedure Laterality Date    FINE NEEDLE ASPIRATION      JOINT REPLACEMENT Right 2010    right TKA Knee via Right    KNEE SURGERY      TUNNELED VENOUS PORT PLACEMENT Left 09/02/2016    DR. Josefina Celeste, ANGIODYNAMIC POWER PORT       Medications Prior to Admission:      Prior to Admission medications    Medication Sig Start Date End Date Taking?  Authorizing Provider   atorvastatin (LIPITOR) 20 MG tablet TAKE 1 TABLET BY MOUTH DAILY 7/12/19   Liz Michaels MD   [facility]) OINT ointment APPLY TOPICALLY TWICE DAILY AS NEEDED 6/7/19   Kandi Garcia MD   rivaroxaban (XARELTO) 20 MG TABS tablet TAKE 1 TABLET BY MOUTH DAILY 5/22/19   Garrick Carrillo MD   lisinopril (PRINIVIL;ZESTRIL) 5 MG tablet TAKE 1 TABLET BY MOUTH EVERY DAY 5/22/19   Garrick Carrillo MD   potassium chloride (KLOR-CON M) 20 MEQ extended release tablet TAKE 1 TABLET BY MOUTH TWICE DAILY 5/22/19 may represent atelectasis and pleural fluid with   underlying pneumonia not excluded. ASSESSMENT:    Right pleural effusion-fairly large. Multiple ventral causes based on differential diagnosis. This could be dated to his lymphoma, possible hemothorax based on falls in the setting of anticoagulation, possibly related to heart failure or hydrothorax from cirrhosis and liver disease      Plan  Symptomatic treatment with therapeutic thoracentesis  Diagnostic thoracentesis -would send for cytology cell count LDH glucose and total protein  Pulmonary consultation  Hold on diuretics and not convinced that this effusion is related to congestive heart failure-patient seems volume depleted      A. Fib-rate controlled. Continue beta-blocker. Xarelto on hold for planned thoracentesis    Poor social situation-PT OT consult.  consult to address needs at home. Patient has poor hygiene and I feel like he needs some further help at home        DVT Prophylaxis SCDs. Xarelto when okay by pulmonary  Diet: DIET LOW SODIUM 2 GM;  Code Status: Full Code        Dispo - 2-3 d       Ean Banda MD    Thank you Sandra Sutton MD for the opportunity to be involved in this patient's care. If you have any questions or concerns please feel free to contact me at [phone].

## 2019-07-29 LAB
HCT VFR BLD CALC: 36.1 % (ref 40.5–52.5)
Lab: NORMAL
REPORT: NORMAL
THIS TEST SENT TO: NORMAL

## 2019-07-29 PROCEDURE — 36415 COLL VENOUS BLD VENIPUNCTURE: CPT

## 2019-07-29 PROCEDURE — 97166 OT EVAL MOD COMPLEX 45 MIN: CPT

## 2019-07-29 PROCEDURE — 85014 HEMATOCRIT: CPT

## 2019-07-29 PROCEDURE — 97162 PT EVAL MOD COMPLEX 30 MIN: CPT

## 2019-07-29 PROCEDURE — 97530 THERAPEUTIC ACTIVITIES: CPT

## 2019-07-29 PROCEDURE — 1200000000 HC SEMI PRIVATE

## 2019-07-29 PROCEDURE — 6370000000 HC RX 637 (ALT 250 FOR IP): Performed by: INTERNAL MEDICINE

## 2019-07-29 PROCEDURE — 99232 SBSQ HOSP IP/OBS MODERATE 35: CPT | Performed by: INTERNAL MEDICINE

## 2019-07-29 PROCEDURE — 97535 SELF CARE MNGMENT TRAINING: CPT

## 2019-07-29 PROCEDURE — 51702 INSERT TEMP BLADDER CATH: CPT

## 2019-07-29 PROCEDURE — 2580000003 HC RX 258: Performed by: INTERNAL MEDICINE

## 2019-07-29 RX ADMIN — SODIUM CHLORIDE, PRESERVATIVE FREE 10 ML: 5 INJECTION INTRAVENOUS at 21:21

## 2019-07-29 RX ADMIN — ASPIRIN 81 MG: 81 TABLET ORAL at 09:34

## 2019-07-29 RX ADMIN — RIVAROXABAN 20 MG: 20 TABLET, FILM COATED ORAL at 09:34

## 2019-07-29 RX ADMIN — MICONAZOLE NITRATE: 20 POWDER TOPICAL at 21:21

## 2019-07-29 RX ADMIN — MICONAZOLE NITRATE: 20 POWDER TOPICAL at 09:34

## 2019-07-29 RX ADMIN — METOPROLOL SUCCINATE 25 MG: 25 TABLET, FILM COATED, EXTENDED RELEASE ORAL at 09:34

## 2019-07-29 RX ADMIN — SODIUM CHLORIDE: 9 INJECTION, SOLUTION INTRAVENOUS at 13:27

## 2019-07-29 RX ADMIN — SODIUM CHLORIDE: 9 INJECTION, SOLUTION INTRAVENOUS at 00:38

## 2019-07-29 RX ADMIN — SODIUM CHLORIDE, PRESERVATIVE FREE 10 ML: 5 INJECTION INTRAVENOUS at 09:34

## 2019-07-29 ASSESSMENT — PAIN SCALES - GENERAL
PAINLEVEL_OUTOF10: 0

## 2019-07-29 NOTE — PROGRESS NOTES
sounds. Musculoskeletal: No clubbing, cyanosis or edema bilaterally. Full range of motion without deformity. Skin: Skin color, texture, turgor normal.  No rashes or lesions. Neurologic: Positive aphasia  Psychiatric: Alert and oriented, thought content appropriate, normal insight  Capillary Refill: Brisk,< 3 seconds   Peripheral Pulses: +2 palpable, equal bilaterally       Labs:   Recent Labs     07/27/19 1938 07/28/19  0643   WBC 9.7 8.6   HGB 10.4* 10.3*   HCT 32.4* 32.1*    215     Recent Labs     07/27/19 1938      K 4.6   CL 94*   CO2 33*   BUN 27*   CREATININE 0.9   CALCIUM 8.8     Recent Labs     07/27/19 1938   AST 25   ALT 16   BILITOT 0.4   ALKPHOS 77     No results for input(s): INR in the last 72 hours. Recent Labs     07/27/19 1938   TROPONINI <0.01       Urinalysis:      Lab Results   Component Value Date    NITRU Negative 07/27/2019    WBCUA 0 03/18/2019    BACTERIA 3+ 05/09/2015    RBCUA 4 03/18/2019    BLOODU Negative 07/27/2019    SPECGRAV 1.011 07/27/2019    GLUCOSEU Negative 07/27/2019       Radiology:  XR CHEST 1 VW   Final Result   Decrease in right pleural effusion. No pneumothorax. CT CHEST ABDOMEN PELVIS W CONTRAST   Final Result   Moderate right pleural effusion. No other acute findings within the chest.      No acute intra-abdominal process. Hepatic cirrhosis. Cholelithiasis without CT evidence of acute cholecystitis. T12 compression fracture appears remote. Multiple remote rib fractures. No definite acute rib fracture. CT Head WO Contrast   Final Result   No acute intracranial abnormality. Moderate cerebral atrophy appropriate for age. Mild chronic ischemic changes   also age-appropriate. No significant change from the prior study. XR CHEST PORTABLE   Final Result   Moderate right lung opacity may represent atelectasis and pleural fluid with   underlying pneumonia not excluded.

## 2019-07-29 NOTE — CONSULTS
Oncology Consult    See dictation    A/P:  1. History of diffuse large B-cell lymphoma\new large right pleural effusion. The effusion is exudative but could be due to a number of causes. Flow cytometry and cytology have been sent and these will be very helpful. 2.  Aphasia. This is apparently worse than his baseline. Brain MRI has been ordered. Thank you for the consultation. Dr. Gloria Temple, his primary oncologist, will see the patient tomorrow.       Tico Ely MD

## 2019-07-29 NOTE — PROGRESS NOTES
at every joint but not able to lift off the surface of the bed  Strength LLE  Comment: the pt able to move off the surface of the bed slightly        Bed mobility  Rolling to Left: Maximum assistance;2 Person assistance  Rolling to Right: Maximum assistance;2 Person assistance  Supine to Sit: Maximum assistance;2 Person assistance  Sit to Supine: Dependent/Total;2 Person assistance  Scooting: Dependent/Total;2 Person assistance(to scoot up in bed with bed in trendelenburg position)  Transfers  Sit to Stand: Unable to assess  Stand to sit: Unable to assess  Bed to Chair: Dependent/Total(with maxi-LIFT and 2 person assist)  Ambulation  Ambulation?: No     Balance  Sitting - Static: Poor  Sitting - Dynamic: Poor  Comments: sat on the EOB x 1 minute with variable assist from mod > max A of 1-2; the pt had mutildirectional LOB but pushed posteriorly the most        Plan   Plan  Times per week: 3-5x/week  Specific instructions for Next Treatment: cotx  Current Treatment Recommendations: Strengthening, ROM, Functional Mobility Training  Safety Devices  Type of devices: Call light within reach, Chair alarm in place, Patient at risk for falls, Left in chair, Nurse notified(JOSE Mayberry notified)    AM-PAC Score  -PAC Inpatient Mobility Raw Score : 8 (07/29/19 1119)  -PAC Inpatient T-Scale Score : 28.52 (07/29/19 1119)  Mobility Inpatient CMS 0-100% Score: 86.62 (07/29/19 1119)  Mobility Inpatient CMS G-Code Modifier : CM (07/29/19 1119)          Goals  Short term goals  Time Frame for Short term goals: upon d/c  Short term goal 1: Bed mobility with mod A of 2. Short term goal 2: Seated EOB x 2-3 minutes with min/mod A of 1-2. Short term goal 3: Pivot transfer bed <> chair with mod A of 1-2. Short term goal 4: Propel w/c with min A 25 feet.    Patient Goals   Patient goals : the pt unable to give a personal therapy goal       Therapy Time   Individual Concurrent Group Co-treatment   Time In 1020         Time Out 1120

## 2019-07-29 NOTE — PROGRESS NOTES
Occupational Therapy   Occupational Therapy Initial Assessment  Date: 2019   Patient Name: Faiza Nobles  MRN: [id number]     : 1945    Date of Service: 2019    Discharge Recommendations:  Patient would benefit from continued therapy after discharge, 3-5 sessions per week     Faiza Nobles scored a  on the AM-PAC ADL Inpatient form. Current research shows that an AM-PAC score of 17 or less is typically not associated with a discharge to the patient's home setting. Based on the patients AM-PAC score and their current ADL deficits, it is recommended that the patient have 3-5 sessions per week of Occupational Therapy at d/c to increase the patients independence. Assessment   Performance deficits / Impairments: Decreased functional mobility ; Decreased ADL status; Decreased strength;Decreased ROM; Decreased cognition;Decreased endurance;Decreased safe awareness;Decreased balance;Decreased fine motor control;Decreased coordination;Decreased high-level IADLs  Assessment: Pt is a 69 y.o. male admitted with R pleural effusion, Acute Hypoxemic Respiratory Failure. Pt with h/o CVA with aphasia and R sided weakness. At baseline, pt lives alone, has HHA, and reports he is mod I ADLs and fxl mobility using w/c mostly vs walker occasionally. Granddaughter assists with IADLs. Pt currently functioning well below baseline, today needing max A x2 bed mobility, mod/max A x2 for sitting balance, and total A for bed-chair transfer via lift equipment (maximZoop). Pt max A for grooming in seated. Pt lethargic and easily fatigued with minimal activity. Will cont to see on acute to address the above limitations and maximize pt's independence. Pt's AM-PAC score indicates need for ongoing skilled OT at d/c.    Prognosis: Good  Decision Making: Medium Complexity  History: see above  Exam: decreased ADL status, balance/fxl mobility, endurance, cognition, strength/ROM B UE's, coordination/FM control  Assistance / Minutes 60         Timed Code Treatment Minutes: 45 Minutes     This note to serve as OT d/c summary if pt is d/c-ed prior to next therapy session.     Dennise Leyden, OTR/L [id number]

## 2019-07-29 NOTE — PROGRESS NOTES
Pulmonary Progress Note    Date of Admission: 7/27/2019   LOS: 2 days     Chief Complaint   Patient presents with    Aphasia     x 1 week, worse today at approx 1815    Cough     congested cough, wheezing       Assessment:     Pleural effusion, Right  Acute Hypoxemic Respiratory Failure with SAO2 <90% on Room Air  Pulmonary Edema  Dysarthria  History of stroke with chronic neurologic deficits  Atrial fibrillation on Xarelto  CHF  Diffuse large B-cell lymphoma    Plan:      -PFA consistent with bloody exudate. HCT cancelled so difficult to classify as hemothorax. Cell lines point away from gross blood as does gross lack of coagulation but can't be certain.  -could still be traumatic sequelae, also strongly favor malignancy given lymphocytic predominance and DLBCL diagnosis, cytology/flow pending.  -Wean supplemental oxygen to goal saturation of >90%  -agree with continued diuresis,due to appearance of background pulmonary edema nd elevated BNP    24 Hour Events/Subjective  No acute events o/n. O2 increased to 5L from 2L yesterday. Says he feels about the same. ROS:   No nausea  No Vomiting  No chest pain      Intake/Output Summary (Last 24 hours) at 7/29/2019 0851  Last data filed at 7/29/2019 1730  Gross per 24 hour   Intake 3802 ml   Output 875 ml   Net 2927 ml         PHYSICAL EXAM:   Blood pressure 114/66, pulse 95, temperature 99.2 °F (37.3 °C), temperature source Oral, resp. rate 16, height 6' 1\" (1.854 m), weight 259 lb 11.2 oz (117.8 kg), SpO2 97 %.'  Gen:  No acute distress. malodorous  Eyes: PERRL. Anicteric sclera. No conjunctival injection. ENT: No discharge. Posterior oropharynx clear. External appearance of ears and nose normal.  Neck: Trachea midline. No mass   Resp:  Bibasilar crackles. No wheezes. No rhonchi. No dullness on percussion. CV: Regular rate. Regular rhythm. No murmur or rub. No edema. GI: Soft, Non-tender. Non-distended. +BS  Skin: Warm, dry, w/o erythema.    Lymph: No

## 2019-07-30 ENCOUNTER — APPOINTMENT (OUTPATIENT)
Dept: GENERAL RADIOLOGY | Age: 74
DRG: 432 | End: 2019-07-30
Payer: MEDICARE

## 2019-07-30 LAB
ALBUMIN SERPL-MCNC: 3.3 G/DL (ref 3.4–5)
AMMONIA: 53 UMOL/L (ref 16–60)
ANION GAP SERPL CALCULATED.3IONS-SCNC: 9 MMOL/L (ref 3–16)
BASE EXCESS ARTERIAL: 7.2 MMOL/L (ref -3–3)
BASE EXCESS ARTERIAL: 8.1 MMOL/L (ref -3–3)
BASOPHILS ABSOLUTE: 0 K/UL (ref 0–0.2)
BASOPHILS RELATIVE PERCENT: 0 %
BUN BLDV-MCNC: 13 MG/DL (ref 7–20)
CALCIUM SERPL-MCNC: 8.4 MG/DL (ref 8.3–10.6)
CARBOXYHEMOGLOBIN ARTERIAL: 2 % (ref 0–1.5)
CARBOXYHEMOGLOBIN ARTERIAL: 2.1 % (ref 0–1.5)
CHLORIDE BLD-SCNC: 100 MMOL/L (ref 99–110)
CO2: 31 MMOL/L (ref 21–32)
CREAT SERPL-MCNC: 0.6 MG/DL (ref 0.8–1.3)
EOSINOPHILS ABSOLUTE: 0.1 K/UL (ref 0–0.6)
EOSINOPHILS RELATIVE PERCENT: 0.6 %
FOLATE: 8.19 NG/ML (ref 4.78–24.2)
GFR AFRICAN AMERICAN: >60
GFR NON-AFRICAN AMERICAN: >60
GLUCOSE BLD-MCNC: 123 MG/DL (ref 70–99)
HCO3 ARTERIAL: 35 MMOL/L (ref 21–29)
HCO3 ARTERIAL: 35.7 MMOL/L (ref 21–29)
HCT VFR BLD CALC: 32.8 % (ref 40.5–52.5)
HEMOGLOBIN, ART, EXTENDED: 10.4 G/DL (ref 13.5–17.5)
HEMOGLOBIN, ART, EXTENDED: 12.4 G/DL (ref 13.5–17.5)
HEMOGLOBIN: 10.5 G/DL (ref 13.5–17.5)
LYMPHOCYTES ABSOLUTE: 0.5 K/UL (ref 1–5.1)
LYMPHOCYTES RELATIVE PERCENT: 5.2 %
MCH RBC QN AUTO: 34.3 PG (ref 26–34)
MCHC RBC AUTO-ENTMCNC: 32 G/DL (ref 31–36)
MCV RBC AUTO: 107.3 FL (ref 80–100)
METHEMOGLOBIN ARTERIAL: 0.6 %
METHEMOGLOBIN ARTERIAL: 0.6 %
MONOCYTES ABSOLUTE: 1 K/UL (ref 0–1.3)
MONOCYTES RELATIVE PERCENT: 10.9 %
NEUTROPHILS ABSOLUTE: 8 K/UL (ref 1.7–7.7)
NEUTROPHILS RELATIVE PERCENT: 83.3 %
O2 CONTENT ARTERIAL: 14 ML/DL
O2 CONTENT ARTERIAL: 16 ML/DL
O2 SAT, ARTERIAL: 95.5 %
O2 SAT, ARTERIAL: 98.6 %
O2 THERAPY: ABNORMAL
O2 THERAPY: ABNORMAL
PCO2 ARTERIAL: 67.3 MMHG (ref 35–45)
PCO2 ARTERIAL: 73.1 MMHG (ref 35–45)
PDW BLD-RTO: 14.6 % (ref 12.4–15.4)
PH ARTERIAL: 7.3 (ref 7.35–7.45)
PH ARTERIAL: 7.34 (ref 7.35–7.45)
PHOSPHORUS: 2.8 MG/DL (ref 2.5–4.9)
PLATELET # BLD: 205 K/UL (ref 135–450)
PMV BLD AUTO: 7.2 FL (ref 5–10.5)
PO2 ARTERIAL: 108 MMHG (ref 75–108)
PO2 ARTERIAL: 74.6 MMHG (ref 75–108)
POTASSIUM SERPL-SCNC: 5.3 MMOL/L (ref 3.5–5.1)
PRO-BNP: 2176 PG/ML (ref 0–124)
PROCALCITONIN: 0.08 NG/ML (ref 0–0.15)
RBC # BLD: 3.06 M/UL (ref 4.2–5.9)
SODIUM BLD-SCNC: 140 MMOL/L (ref 136–145)
T4 FREE: 0.9 NG/DL (ref 0.9–1.8)
TCO2 ARTERIAL: 37.2 MMOL/L
TCO2 ARTERIAL: 37.7 MMOL/L
TSH SERPL DL<=0.05 MIU/L-ACNC: 1.9 UIU/ML (ref 0.27–4.2)
VITAMIN B-12: 603 PG/ML (ref 211–911)
WBC # BLD: 9.6 K/UL (ref 4–11)

## 2019-07-30 PROCEDURE — 51702 INSERT TEMP BLADDER CATH: CPT

## 2019-07-30 PROCEDURE — 85025 COMPLETE CBC W/AUTO DIFF WBC: CPT

## 2019-07-30 PROCEDURE — 84439 ASSAY OF FREE THYROXINE: CPT

## 2019-07-30 PROCEDURE — 84443 ASSAY THYROID STIM HORMONE: CPT

## 2019-07-30 PROCEDURE — 94761 N-INVAS EAR/PLS OXIMETRY MLT: CPT

## 2019-07-30 PROCEDURE — 71045 X-RAY EXAM CHEST 1 VIEW: CPT

## 2019-07-30 PROCEDURE — 94640 AIRWAY INHALATION TREATMENT: CPT

## 2019-07-30 PROCEDURE — 6370000000 HC RX 637 (ALT 250 FOR IP): Performed by: INTERNAL MEDICINE

## 2019-07-30 PROCEDURE — 80069 RENAL FUNCTION PANEL: CPT

## 2019-07-30 PROCEDURE — 99233 SBSQ HOSP IP/OBS HIGH 50: CPT | Performed by: INTERNAL MEDICINE

## 2019-07-30 PROCEDURE — 83880 ASSAY OF NATRIURETIC PEPTIDE: CPT

## 2019-07-30 PROCEDURE — 36600 WITHDRAWAL OF ARTERIAL BLOOD: CPT

## 2019-07-30 PROCEDURE — 2580000003 HC RX 258: Performed by: INTERNAL MEDICINE

## 2019-07-30 PROCEDURE — 84145 PROCALCITONIN (PCT): CPT

## 2019-07-30 PROCEDURE — 82607 VITAMIN B-12: CPT

## 2019-07-30 PROCEDURE — 82140 ASSAY OF AMMONIA: CPT

## 2019-07-30 PROCEDURE — 94660 CPAP INITIATION&MGMT: CPT

## 2019-07-30 PROCEDURE — 2700000000 HC OXYGEN THERAPY PER DAY

## 2019-07-30 PROCEDURE — 2060000000 HC ICU INTERMEDIATE R&B

## 2019-07-30 PROCEDURE — 6360000002 HC RX W HCPCS: Performed by: INTERNAL MEDICINE

## 2019-07-30 PROCEDURE — 82746 ASSAY OF FOLIC ACID SERUM: CPT

## 2019-07-30 PROCEDURE — 82803 BLOOD GASES ANY COMBINATION: CPT

## 2019-07-30 RX ORDER — FUROSEMIDE 10 MG/ML
40 INJECTION INTRAMUSCULAR; INTRAVENOUS DAILY
Status: DISCONTINUED | OUTPATIENT
Start: 2019-07-30 | End: 2019-08-03

## 2019-07-30 RX ORDER — IPRATROPIUM BROMIDE AND ALBUTEROL SULFATE 2.5; .5 MG/3ML; MG/3ML
1 SOLUTION RESPIRATORY (INHALATION)
Status: DISCONTINUED | OUTPATIENT
Start: 2019-07-30 | End: 2019-08-06 | Stop reason: HOSPADM

## 2019-07-30 RX ADMIN — MOMETASONE FUROATE AND FORMOTEROL FUMARATE DIHYDRATE 2 PUFF: 200; 5 AEROSOL RESPIRATORY (INHALATION) at 20:02

## 2019-07-30 RX ADMIN — IPRATROPIUM BROMIDE AND ALBUTEROL SULFATE 1 AMPULE: .5; 3 SOLUTION RESPIRATORY (INHALATION) at 20:02

## 2019-07-30 RX ADMIN — SODIUM CHLORIDE: 9 INJECTION, SOLUTION INTRAVENOUS at 13:11

## 2019-07-30 RX ADMIN — IPRATROPIUM BROMIDE AND ALBUTEROL SULFATE 1 AMPULE: .5; 3 SOLUTION RESPIRATORY (INHALATION) at 12:23

## 2019-07-30 RX ADMIN — SODIUM CHLORIDE, PRESERVATIVE FREE 10 ML: 5 INJECTION INTRAVENOUS at 21:12

## 2019-07-30 RX ADMIN — ASPIRIN 81 MG: 81 TABLET ORAL at 09:21

## 2019-07-30 RX ADMIN — METOPROLOL SUCCINATE 25 MG: 25 TABLET, FILM COATED, EXTENDED RELEASE ORAL at 09:21

## 2019-07-30 RX ADMIN — SODIUM CHLORIDE, PRESERVATIVE FREE 10 ML: 5 INJECTION INTRAVENOUS at 09:24

## 2019-07-30 RX ADMIN — IPRATROPIUM BROMIDE AND ALBUTEROL SULFATE 1 AMPULE: .5; 3 SOLUTION RESPIRATORY (INHALATION) at 15:38

## 2019-07-30 RX ADMIN — RIVAROXABAN 20 MG: 20 TABLET, FILM COATED ORAL at 09:20

## 2019-07-30 RX ADMIN — SODIUM CHLORIDE: 9 INJECTION, SOLUTION INTRAVENOUS at 02:26

## 2019-07-30 RX ADMIN — FUROSEMIDE 40 MG: 10 INJECTION, SOLUTION INTRAMUSCULAR; INTRAVENOUS at 12:11

## 2019-07-30 RX ADMIN — MICONAZOLE NITRATE: 20 POWDER TOPICAL at 09:23

## 2019-07-30 RX ADMIN — MICONAZOLE NITRATE: 20 POWDER TOPICAL at 21:12

## 2019-07-30 ASSESSMENT — PAIN SCALES - PAIN ASSESSMENT IN ADVANCED DEMENTIA (PAINAD)
TOTALSCORE: 0
CONSOLABILITY: 0
BODYLANGUAGE: 0
FACIALEXPRESSION: 0
NEGVOCALIZATION: 0
BREATHING: 0

## 2019-07-30 ASSESSMENT — PAIN SCALES - GENERAL
PAINLEVEL_OUTOF10: 0
PAINLEVEL_OUTOF10: 0

## 2019-07-30 NOTE — PROGRESS NOTES
Pulmonary Progress Note    Date of Admission: 7/27/2019   LOS: 3 days     Chief Complaint   Patient presents with    Aphasia     x 1 week, worse today at approx 1815    Cough     congested cough, wheezing       Assessment:     Pleural effusion, Right  Acute Hypoxemic Respiratory Failure with SAO2 <90% on Room Air  Acute Hypercapnic Respiratory Failure, pco2 >70  Acute Pulmonary Edema  Dysarthria  History of stroke with chronic neurologic deficits  Atrial fibrillation on Xarelto  CHF  Diffuse large B-cell lymphoma    Plan:        -worsened respiratory status with worsened hypoxia and hypercapnia. -start bronchodilator therapy duonebs and dulera  -given elevated BNP and appearance of pulmonary edema on CXR will dose with IV lasix now. Repeat CXR pending.  -Last echo in 2016 with normal LVEF, will repeat  -PFA consistent with bloody exudate. Pleural HCT was only 4%, so no consistent with overt hemothorax  -could still be traumatic sequelae, also strongly favor malignancy given lymphocytic predominance and DLBCL diagnosis, flow cytology unremarkable.  -Wean supplemental oxygen to goal saturation of >90%    24 Hour Events/Subjective  O2 increased to 9LPM today, abg obtained with rising Co2 as well. ROS:   No nausea  No Vomiting  No chest pain      Intake/Output Summary (Last 24 hours) at 7/30/2019 0912  Last data filed at 7/30/2019 0612  Gross per 24 hour   Intake 2319 ml   Output 1150 ml   Net 1169 ml         PHYSICAL EXAM:   Blood pressure 108/71, pulse 106, temperature 97.8 °F (36.6 °C), temperature source Oral, resp. rate 20, height 6' 1\" (1.854 m), weight 261 lb 3.9 oz (118.5 kg), SpO2 96 %.'  Gen:  No acute distress. Lethargic. Eyes: PERRL. Anicteric sclera. No conjunctival injection. ENT: No discharge. Posterior oropharynx clear. External appearance of ears and nose normal.  Neck: Trachea midline. No mass   Resp:  Bibasilar crackles. No wheezes. No rhonchi. No dullness on percussion. CV: Regular rate. Regular rhythm. No murmur or rub. No edema. GI: Soft, Non-tender. Non-distended. +BS  Skin: Warm, dry, w/o erythema. Lymph: No cervical or supraclavicular LAD. M/S: No cyanosis. No clubbing. Neuro:  Chronic neuro deficits including dysarthria, aphasia. Moves all extremities      Medications:    Scheduled Meds:   mometasone-formoterol  2 puff Inhalation BID    ipratropium-albuterol  1 ampule Inhalation Q4H WA    furosemide  40 mg Intravenous Daily    miconazole   Topical BID    rivaroxaban  20 mg Oral Daily with breakfast    aspirin  81 mg Oral Daily    metoprolol succinate  25 mg Oral Daily    sodium chloride flush  10 mL Intravenous 2 times per day       Continuous Infusions:   sodium chloride 75 mL/hr at 07/30/19 0226       PRN Meds:  sodium chloride flush, magnesium hydroxide, ondansetron    Labs reviewed:  CBC:   Recent Labs     07/27/19 1938 07/28/19  0643 07/29/19  0833   WBC 9.7 8.6  --    HGB 10.4* 10.3*  --    HCT 32.4* 32.1* 36.1*   .7* 106.1*  --     215  --      BMP:   Recent Labs     07/27/19 1938      K 4.6   CL 94*   CO2 33*   BUN 27*   CREATININE 0.9     LIVER PROFILE:   Recent Labs     07/27/19 1938   AST 25   ALT 16   BILITOT 0.4   ALKPHOS 77     PT/INR: No results for input(s): PROTIME, INR in the last 72 hours. APTT: No results for input(s): APTT in the last 72 hours. UA:  Recent Labs     07/27/19 1947   COLORU YELLOW   PHUR 5.5  5.5   CLARITYU Clear   SPECGRAV 1.011   LEUKOCYTESUR Negative   UROBILINOGEN 1.0   BILIRUBINUR Negative   BLOODU Negative   GLUCOSEU Negative     No results for input(s): PH, PCO2, PO2 in the last 72 hours. Films:  Radiology Review:  Pertinent images / reports were reviewed as a part of this visit. CT Chest w/ contrast: No results found for this or any previous visit.     CT Chest w/o contrast:   Results for orders placed during the hospital encounter of 07/29/16   CT Chest WO Contrast    Narrative EXAMINATION:  CT OF THE CHEST WITHOUT CONTRAST 7/29/2016 11:23 am    TECHNIQUE:  CT of the chest was performed without the administration of intravenous  contrast. Multiplanar reformatted images are provided for review. Dose  modulation, iterative reconstruction, and/or weight based adjustment of the  mA/kV was utilized to reduce the radiation dose to as low as reasonably  achievable. COMPARISON:  05/03/2015 chest x-ray, 721 scratch the 07/27/2016 CT neck    HISTORY:  ORDERING SYSTEM PROVIDED HISTORY: Mass of upper lobe of left lung  TECHNOLOGIST PROVIDED HISTORY:  Ordering Physician Provided Reason for Exam: Mass of upper lobe of left lung  seen on CT neck  Acuity: Acute  Type of Exam: Initial    History of congestive heart failure, hypertension    FINDINGS:  Mediastinum: No mediastinal adenopathy. Mild to moderate coronary arterial.  Somewhat prominent similar to prior studies. Lungs/pleura: The central airways are patent. There is left basilar and left  apical parenchymal and pleural scarring ; volume loss is evident. There is a  branching nodular density within the left lung apex also seen on neck CT with  the thickest component measuring up to 1 cm. The right lung is clear. Upper Abdomen: Limited noncontrast images of the upper abdomen show no acute  abnormality. Calcified stones are present within a collapsed gallbladder. Soft Tissues/Bones: No acute or suspicious focal bony abnormality. Impression Left lung pleural and parenchymal scarring with volume loss. A branching  density within the left lung apex also likely represents scarring. Ideally,  comparison with prior studies could be performed to demonstrate stability. Otherwise, short-term follow-up chest CT in 3 months versus PET-CT could be  performed. CTPA: No results found for this or any previous visit.     CXR PA/LAT:   Results for orders placed during the hospital encounter of 12/26/17   XR CHEST STANDARD (2 VW)    Narrative

## 2019-07-30 NOTE — PROGRESS NOTES
Pt had critical lab value - PCO2 of 73. Contacted Dr. Guerline Hobson and he decided to transfer pt to PCU with BiPap.

## 2019-07-30 NOTE — PROGRESS NOTES
Dr. Dori Barahona stopped me to tell me that the pt has deteriorated below baseline and that I should call hospitalist (Dr. Gianluca Mcgowan).   Perfect served Dr. Gianluca Mcgowan

## 2019-07-31 LAB
BODY FLUID CULTURE, STERILE: NORMAL
GRAM STAIN RESULT: NORMAL
LV EF: 63 %
LVEF MODALITY: NORMAL

## 2019-07-31 PROCEDURE — 99233 SBSQ HOSP IP/OBS HIGH 50: CPT | Performed by: INTERNAL MEDICINE

## 2019-07-31 PROCEDURE — 2580000003 HC RX 258: Performed by: INTERNAL MEDICINE

## 2019-07-31 PROCEDURE — 92610 EVALUATE SWALLOWING FUNCTION: CPT

## 2019-07-31 PROCEDURE — 6360000002 HC RX W HCPCS: Performed by: INTERNAL MEDICINE

## 2019-07-31 PROCEDURE — 94640 AIRWAY INHALATION TREATMENT: CPT

## 2019-07-31 PROCEDURE — 94660 CPAP INITIATION&MGMT: CPT

## 2019-07-31 PROCEDURE — 6370000000 HC RX 637 (ALT 250 FOR IP): Performed by: NURSE PRACTITIONER

## 2019-07-31 PROCEDURE — 6370000000 HC RX 637 (ALT 250 FOR IP): Performed by: INTERNAL MEDICINE

## 2019-07-31 PROCEDURE — 2500000003 HC RX 250 WO HCPCS: Performed by: INTERNAL MEDICINE

## 2019-07-31 PROCEDURE — 94761 N-INVAS EAR/PLS OXIMETRY MLT: CPT

## 2019-07-31 PROCEDURE — 2060000000 HC ICU INTERMEDIATE R&B

## 2019-07-31 PROCEDURE — 2700000000 HC OXYGEN THERAPY PER DAY

## 2019-07-31 PROCEDURE — 93306 TTE W/DOPPLER COMPLETE: CPT

## 2019-07-31 RX ORDER — ACETAMINOPHEN 325 MG/1
650 TABLET ORAL EVERY 4 HOURS PRN
Status: DISCONTINUED | OUTPATIENT
Start: 2019-07-31 | End: 2019-08-06 | Stop reason: HOSPADM

## 2019-07-31 RX ADMIN — SODIUM CHLORIDE, PRESERVATIVE FREE 10 ML: 5 INJECTION INTRAVENOUS at 10:22

## 2019-07-31 RX ADMIN — MOMETASONE FUROATE AND FORMOTEROL FUMARATE DIHYDRATE 2 PUFF: 200; 5 AEROSOL RESPIRATORY (INHALATION) at 20:17

## 2019-07-31 RX ADMIN — RIVAROXABAN 20 MG: 20 TABLET, FILM COATED ORAL at 10:25

## 2019-07-31 RX ADMIN — MICONAZOLE NITRATE: 20 POWDER TOPICAL at 10:22

## 2019-07-31 RX ADMIN — ASPIRIN 81 MG: 81 TABLET ORAL at 10:22

## 2019-07-31 RX ADMIN — METOPROLOL SUCCINATE 25 MG: 25 TABLET, FILM COATED, EXTENDED RELEASE ORAL at 10:22

## 2019-07-31 RX ADMIN — MICONAZOLE NITRATE: 20 POWDER TOPICAL at 22:47

## 2019-07-31 RX ADMIN — MOMETASONE FUROATE AND FORMOTEROL FUMARATE DIHYDRATE 2 PUFF: 200; 5 AEROSOL RESPIRATORY (INHALATION) at 11:47

## 2019-07-31 RX ADMIN — FUROSEMIDE 40 MG: 10 INJECTION, SOLUTION INTRAMUSCULAR; INTRAVENOUS at 10:22

## 2019-07-31 RX ADMIN — ACETAMINOPHEN 650 MG: 325 TABLET ORAL at 23:46

## 2019-07-31 RX ADMIN — SODIUM CHLORIDE 75 ML/HR: 9 INJECTION, SOLUTION INTRAVENOUS at 04:06

## 2019-07-31 RX ADMIN — IPRATROPIUM BROMIDE AND ALBUTEROL SULFATE 1 AMPULE: .5; 3 SOLUTION RESPIRATORY (INHALATION) at 15:50

## 2019-07-31 RX ADMIN — IPRATROPIUM BROMIDE AND ALBUTEROL SULFATE 1 AMPULE: .5; 3 SOLUTION RESPIRATORY (INHALATION) at 11:47

## 2019-07-31 RX ADMIN — IPRATROPIUM BROMIDE AND ALBUTEROL SULFATE 1 AMPULE: .5; 3 SOLUTION RESPIRATORY (INHALATION) at 20:17

## 2019-07-31 ASSESSMENT — PAIN SCALES - PAIN ASSESSMENT IN ADVANCED DEMENTIA (PAINAD)
FACIALEXPRESSION: 0
BREATHING: 0
BREATHING: 0
FACIALEXPRESSION: 0
NEGVOCALIZATION: 0
BODYLANGUAGE: 0
NEGVOCALIZATION: 0
CONSOLABILITY: 0
NEGVOCALIZATION: 0
CONSOLABILITY: 0
BODYLANGUAGE: 0
TOTALSCORE: 0
FACIALEXPRESSION: 0
BREATHING: 0
NEGVOCALIZATION: 0
BODYLANGUAGE: 0
FACIALEXPRESSION: 0
TOTALSCORE: 0
BREATHING: 0
FACIALEXPRESSION: 0
NEGVOCALIZATION: 0
CONSOLABILITY: 0
NEGVOCALIZATION: 0
NEGVOCALIZATION: 0
CONSOLABILITY: 0
NEGVOCALIZATION: 0
TOTALSCORE: 0
FACIALEXPRESSION: 0
BREATHING: 0
TOTALSCORE: 0
CONSOLABILITY: 0
BODYLANGUAGE: 0
BODYLANGUAGE: 0
NEGVOCALIZATION: 0
FACIALEXPRESSION: 0
CONSOLABILITY: 0
BREATHING: 0
NEGVOCALIZATION: 0
TOTALSCORE: 0
CONSOLABILITY: 0
BODYLANGUAGE: 0
FACIALEXPRESSION: 0
CONSOLABILITY: 0
BREATHING: 0
BODYLANGUAGE: 0
BODYLANGUAGE: 0
FACIALEXPRESSION: 0
TOTALSCORE: 0
BODYLANGUAGE: 0
FACIALEXPRESSION: 0
TOTALSCORE: 0
BODYLANGUAGE: 0

## 2019-07-31 ASSESSMENT — PAIN SCALES - GENERAL
PAINLEVEL_OUTOF10: 0
PAINLEVEL_OUTOF10: 5
PAINLEVEL_OUTOF10: 0

## 2019-07-31 ASSESSMENT — PAIN DESCRIPTION - PAIN TYPE: TYPE: ACUTE PAIN

## 2019-07-31 ASSESSMENT — PAIN DESCRIPTION - DESCRIPTORS: DESCRIPTORS: ACHING

## 2019-07-31 ASSESSMENT — PAIN DESCRIPTION - PROGRESSION: CLINICAL_PROGRESSION: NOT CHANGED

## 2019-07-31 ASSESSMENT — PAIN DESCRIPTION - ONSET: ONSET: GRADUAL

## 2019-07-31 ASSESSMENT — PAIN DESCRIPTION - LOCATION: LOCATION: NECK

## 2019-07-31 ASSESSMENT — PAIN DESCRIPTION - FREQUENCY: FREQUENCY: CONTINUOUS

## 2019-07-31 ASSESSMENT — PAIN - FUNCTIONAL ASSESSMENT: PAIN_FUNCTIONAL_ASSESSMENT: ACTIVITIES ARE NOT PREVENTED

## 2019-07-31 NOTE — PROGRESS NOTES
Speech Language Pathology  Facility/Department: [address]   CLINICAL BEDSIDE SWALLOW EVALUATION    NAME: Keyshawn Smith  : 1945  MRN: [id number]    ADMISSION DATE: 2019  ADMITTING DIAGNOSIS: Pleural effusion [J90]     Keyshawn Smith has Anasarca; Cerebral infarction [facility]); Hypokalemia; Morbid obesity ([address] CHF (congestive heart failure) ([address] Abnormal stress test; Chronic atrial fibrillation ([address] Chronic diastolic congestive heart failure ([address] Bradycardia; Diffuse large B-cell lymphoma of lymph nodes of neck ([address] Debility; Cerebrovascular accident (CVA) ([address] Anemia due to bone marrow failure ([address] Venous stasis dermatitis of both lower extremities; Patient nonadherence; Right hemiparesis ([address] Flexion contracture of right knee; Frequent falls; and Pleural effusion on their problem list.    ONSET DATE:  2019     Chart review:  2019 admitted with SOB. Admission H&P HPI:  69 y.o. male who presented [facility] [place] with increasing shortness of breath. Patient states he has had dyspnea over the last week. Patient states he has had recurrent falls. Patient is on Xarelto therapy for A. fib. Patient states cough has been nonproductive. He denies hemoptysis. Patient has some communication issues related to prior stroke. He has right hemiparesis at baseline  2019 thoracentesis  2019 worsening respiratory status charted  2019 BSE ordered, but not completed d/t BiPAP need  2019 CXR:  Impression   Large right pleural effusion which has increased in size since 2019.    Moderate atelectasis in the right lung.  Slight pulmonary vascular congestion   improved from the prior study.       RECOMMENDATION:   Patient may benefit from ultrasound-guided thoracentesis.           Date of Eval: 2019  Evaluating Therapist: Dion Marin    Current Diet level:  Current Diet : Regular  Current Liquid Diet : Thin      Primary Complaint  Patient

## 2019-07-31 NOTE — PROGRESS NOTES
may represent atelectasis and pleural fluid with   underlying pneumonia not excluded. MRI BRAIN WO CONTRAST    (Results Pending)           Assessment/Plan:    Active Hospital Problems    Diagnosis Date Noted    Pleural effusion [J90] 07/27/2019       Acute Resp Failure with Hypoxia and hypercapnia  - CXR with pleural effusion  -Resolved with BiPAP  -Improving    Right pleural effusion-fairly large. - Multiple ventral causes based on differential diagnosis. This could be dated to his lymphoma, possible hemothorax based on falls in the setting of anticoagulation, possibly related to heart failure or hydrothorax from cirrhosis and liver disease  -Symptomatic treatment with therapeutic thoracentesis  -Diagnostic thoracentesis: Exudative effusion  -Pulmonary consultation  - repeat imaging today with worsening resp status  - [place] cytometry without malignant cells  -Interventional radiology to place chest tube     A. Fib-rate controlled. Continue beta-blocker. Xarelto restarted, now held for procedure    Hx diffuse large B Cell lymphoma  -continue to monitor  -Consult oncology with exudative effusion: no malignant cells on flow    Chronic CHF  -has unilateral pleural effusion and otherwise looks compensated  -continue to monitor fluid status    Hx CVA with residual right sided deficits, aphasia  -continue to monitor  -PT/OT ordered  -Worsened from baseline  -MRI ordered but unable to lie flat    Essential hypertension  -continue home meds    Obesity due to excess calories  -nutrition counseling provided     Poor social situation-PT OT consult.  consult to address needs at home.   Patient has poor hygiene and I feel like he needs some further help at home      DVT Prophylaxis: hold Xarelto for possible thoracentesis  Diet: DIET LOW SODIUM 2 GM; 1500 ml  Code Status: Full Code    PT/OT Eval Status: 5-7 sessions per week    Dispo - continue care    Lawrence Cm MD

## 2019-07-31 NOTE — PROGRESS NOTES
Discussed MRI with patient and Daughter, they are saying patient can not lay flat and is unable to do. Sumeet Mi said they tried to do one on last admission but was unable to do.

## 2019-07-31 NOTE — PLAN OF CARE
Fall risk assessment completed every shift. All precautions in place. Pt has call light within reach at all times. Room clear of clutter. Pt aware to call for assistance when getting up. Skin assessment completed every shift. Pt assessed for incontinence, appropriate barrier cream applied prn. Pt encouraged to turn/rotate every 2 hours. Assistance provided if pt unable to do so themselves. Pain/discomfort being managed with PRN analgesics per MD orders. Pt able to express presence and absence of pain and rate pain appropriately using numerical scale. Educated patient/family on CHF signs/ symptoms, causes, treatments, limited fluid intake, daily weights, discharge medications, low sodium diet, activiety and follow-up. Pt to call physician if weight gain of 3 pounds in one day or 5 pounds in one week.

## 2019-08-01 ENCOUNTER — APPOINTMENT (OUTPATIENT)
Dept: GENERAL RADIOLOGY | Age: 74
DRG: 432 | End: 2019-08-01
Payer: MEDICARE

## 2019-08-01 LAB
A/G RATIO: 0.8 (ref 1.1–2.2)
ALBUMIN SERPL-MCNC: 2.8 G/DL (ref 3.4–5)
ALP BLD-CCNC: 74 U/L (ref 40–129)
ALT SERPL-CCNC: 16 U/L (ref 10–40)
ANION GAP SERPL CALCULATED.3IONS-SCNC: 5 MMOL/L (ref 3–16)
AST SERPL-CCNC: 31 U/L (ref 15–37)
BILIRUB SERPL-MCNC: 0.6 MG/DL (ref 0–1)
BUN BLDV-MCNC: 14 MG/DL (ref 7–20)
CALCIUM SERPL-MCNC: 8.2 MG/DL (ref 8.3–10.6)
CHLORIDE BLD-SCNC: 95 MMOL/L (ref 99–110)
CO2: 37 MMOL/L (ref 21–32)
CREAT SERPL-MCNC: 0.5 MG/DL (ref 0.8–1.3)
GFR AFRICAN AMERICAN: >60
GFR NON-AFRICAN AMERICAN: >60
GLOBULIN: 3.7 G/DL
GLUCOSE BLD-MCNC: 114 MG/DL (ref 70–99)
POTASSIUM SERPL-SCNC: 4.4 MMOL/L (ref 3.5–5.1)
SODIUM BLD-SCNC: 137 MMOL/L (ref 136–145)
TOTAL PROTEIN: 6.5 G/DL (ref 6.4–8.2)

## 2019-08-01 PROCEDURE — 80053 COMPREHEN METABOLIC PANEL: CPT

## 2019-08-01 PROCEDURE — 2700000000 HC OXYGEN THERAPY PER DAY

## 2019-08-01 PROCEDURE — 97530 THERAPEUTIC ACTIVITIES: CPT

## 2019-08-01 PROCEDURE — 74230 X-RAY XM SWLNG FUNCJ C+: CPT

## 2019-08-01 PROCEDURE — 2060000000 HC ICU INTERMEDIATE R&B

## 2019-08-01 PROCEDURE — 92526 ORAL FUNCTION THERAPY: CPT

## 2019-08-01 PROCEDURE — 94640 AIRWAY INHALATION TREATMENT: CPT

## 2019-08-01 PROCEDURE — 6360000002 HC RX W HCPCS: Performed by: INTERNAL MEDICINE

## 2019-08-01 PROCEDURE — 99232 SBSQ HOSP IP/OBS MODERATE 35: CPT | Performed by: INTERNAL MEDICINE

## 2019-08-01 PROCEDURE — 2580000003 HC RX 258: Performed by: INTERNAL MEDICINE

## 2019-08-01 PROCEDURE — 6370000000 HC RX 637 (ALT 250 FOR IP): Performed by: INTERNAL MEDICINE

## 2019-08-01 PROCEDURE — 94761 N-INVAS EAR/PLS OXIMETRY MLT: CPT

## 2019-08-01 PROCEDURE — 92611 MOTION FLUOROSCOPY/SWALLOW: CPT

## 2019-08-01 PROCEDURE — 94660 CPAP INITIATION&MGMT: CPT

## 2019-08-01 RX ADMIN — MICONAZOLE NITRATE: 20 POWDER TOPICAL at 09:23

## 2019-08-01 RX ADMIN — SODIUM CHLORIDE, PRESERVATIVE FREE 10 ML: 5 INJECTION INTRAVENOUS at 20:31

## 2019-08-01 RX ADMIN — IPRATROPIUM BROMIDE AND ALBUTEROL SULFATE 1 AMPULE: .5; 3 SOLUTION RESPIRATORY (INHALATION) at 12:32

## 2019-08-01 RX ADMIN — MICONAZOLE NITRATE: 20 POWDER TOPICAL at 20:30

## 2019-08-01 RX ADMIN — SODIUM CHLORIDE, PRESERVATIVE FREE 10 ML: 5 INJECTION INTRAVENOUS at 09:23

## 2019-08-01 RX ADMIN — MOMETASONE FUROATE AND FORMOTEROL FUMARATE DIHYDRATE 2 PUFF: 200; 5 AEROSOL RESPIRATORY (INHALATION) at 07:44

## 2019-08-01 RX ADMIN — FUROSEMIDE 40 MG: 10 INJECTION, SOLUTION INTRAMUSCULAR; INTRAVENOUS at 09:23

## 2019-08-01 RX ADMIN — IPRATROPIUM BROMIDE AND ALBUTEROL SULFATE 1 AMPULE: .5; 3 SOLUTION RESPIRATORY (INHALATION) at 20:05

## 2019-08-01 RX ADMIN — IPRATROPIUM BROMIDE AND ALBUTEROL SULFATE 1 AMPULE: .5; 3 SOLUTION RESPIRATORY (INHALATION) at 15:59

## 2019-08-01 RX ADMIN — IPRATROPIUM BROMIDE AND ALBUTEROL SULFATE 1 AMPULE: .5; 3 SOLUTION RESPIRATORY (INHALATION) at 07:43

## 2019-08-01 RX ADMIN — METOPROLOL SUCCINATE 25 MG: 25 TABLET, FILM COATED, EXTENDED RELEASE ORAL at 09:23

## 2019-08-01 RX ADMIN — MOMETASONE FUROATE AND FORMOTEROL FUMARATE DIHYDRATE 2 PUFF: 200; 5 AEROSOL RESPIRATORY (INHALATION) at 20:05

## 2019-08-01 ASSESSMENT — PAIN SCALES - PAIN ASSESSMENT IN ADVANCED DEMENTIA (PAINAD)
FACIALEXPRESSION: 0
BODYLANGUAGE: 0
CONSOLABILITY: 0
TOTALSCORE: 0
CONSOLABILITY: 0
BODYLANGUAGE: 0
TOTALSCORE: 0
BODYLANGUAGE: 0
NEGVOCALIZATION: 0
TOTALSCORE: 0
BODYLANGUAGE: 0
NEGVOCALIZATION: 0
NEGVOCALIZATION: 0
BODYLANGUAGE: 0
BREATHING: 0
NEGVOCALIZATION: 0
TOTALSCORE: 0
FACIALEXPRESSION: 0
BREATHING: 0
FACIALEXPRESSION: 0
FACIALEXPRESSION: 0
CONSOLABILITY: 0
TOTALSCORE: 0
CONSOLABILITY: 0
FACIALEXPRESSION: 0
NEGVOCALIZATION: 0
CONSOLABILITY: 0
BODYLANGUAGE: 0
CONSOLABILITY: 0
BREATHING: 0
FACIALEXPRESSION: 0
BREATHING: 0
NEGVOCALIZATION: 0
TOTALSCORE: 0

## 2019-08-01 ASSESSMENT — PAIN SCALES - GENERAL
PAINLEVEL_OUTOF10: 0
PAINLEVEL_OUTOF10: 0

## 2019-08-01 NOTE — PROGRESS NOTES
VITALS:  /62   Pulse 95   Temp 97.6 °F (36.4 °C) (Oral)   Resp 24   Ht 6' 1\" (1.854 m)   Wt 264 lb 8.8 oz (120 kg)   SpO2 98%   BMI 34.90 kg/m²   TEMPERATURE:  Current - Temp: 97.6 °F (36.4 °C); Max - Temp  Av.8 °F (36.6 °C)  Min: 97.3 °F (36.3 °C)  Max: 98.2 °F (36.8 °C)  PULSE OXIMETRY RANGE: SpO2  Av.8 %  Min: 80 %  Max: 98 %  24HR INTAKE/OUTPUT:      Intake/Output Summary (Last 24 hours) at 2019 1501  Last data filed at 2019 1306  Gross per 24 hour   Intake 840 ml   Output 2950 ml   Net -2110 ml       CONSTITUTIONAL:  Ill appearing. No acute distress  HEENT oral pharynx , no scleral icterus - JVD  HEMATOLOGIC/LYMPHATICS:  no cervical lymphadenopathy, no supraclavicular lymphadenopathy, no axillary lymphadenopathy and no inguinal lymphadenopathy  LUNGS:  Diminished on R, occ crackles or wheezing  CARDIOVASCULAR:  regular rate and rhythm, normal S1 and S2, no S3 or S4, and no murmur noted  ABDOMEN:  No scars, normal bowel sounds, soft, non-distended, non-tender, no masses palpated, no hepatosplenomegally  MUSCULOSKELETAL:  There is no redness, warmth, or swelling of the joints. EXTREMETIES: No clubbing cynosis or edema  NEUROLOGIC:  Nonfocal grossly. Unable to communicate.  Alert  SKIN:  no bruising or bleeding      Data      Recent Labs     19  1128   WBC 9.6   HGB 10.5*   HCT 32.8*      .3*        Recent Labs     19  1128 19  1339    137   K 5.3* 4.4    95*   CO2 31 37*   PHOS 2.8  --    BUN 13 14   CREATININE 0.6* 0.5*     Recent Labs     19  1339   AST 31   ALT 16   BILITOT 0.6   ALKPHOS 74       Magnesium:    Lab Results   Component Value Date    MG 2.10 2019    MG 2.40 2017    MG 2.10 2017     CT OF THE CHEST, ABDOMEN, AND PELVIS WITH CONTRAST 2019 8:32 pm       TECHNIQUE:   CT of the chest, abdomen and pelvis was performed with the administration of   intravenous contrast. Multiplanar reformatted images changes.           Impression   Moderate right pleural effusion.  No other acute findings within the chest.       No acute intra-abdominal process.       Hepatic cirrhosis.       Cholelithiasis without CT evidence of acute cholecystitis.       T12 compression fracture appears remote.       Multiple remote rib fractures.  No definite acute rib fracture.             Problem List  Patient Active Problem List   Diagnosis    Anasarca    Cerebral infarction ([address]    Hypokalemia    Morbid obesity ([address]    CHF (congestive heart failure) (HCC)    Abnormal stress test    Chronic atrial fibrillation (HCC)    Chronic diastolic congestive heart failure (HCC)    Bradycardia    Diffuse large B-cell lymphoma of lymph nodes of neck ([address]    Debility    Cerebrovascular accident (CVA) ([address]    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)    Venous stasis dermatitis of both lower extremities    Patient nonadherence    Right hemiparesis (HCC)    Flexion contracture of right knee    Frequent falls    Pleural effusion       ASSESSMENT AND PLAN    The patient presents with a bloody pleural effusion. There was a predominance of lymphocytes raising the question of lapsing malignancy. Flow cytometry, however, shows no phenotypic evidence of a non-Hodgkin's lymphoma. The patient is also on chronic anticoagulation for A. fib and a traumatic pneumonia hemothorax is also in the differential diagnosis. The patient is currently breathing a bit better. Of note, the CT scans did not show any pathologic appearing lymphadenopathy. The patient is anemic with macrocytic red blood cell indices. This may speak to his liver disease. X47, folic acid and TSH were all normal. A normal LDH of 162 on 7/28/2019 also speaks  against malignancy. It also speaks against hemolysis. The patient also had a normal bilirubin on 7/27/2019. There was no malignant cells seen in his pleural fluid as well.     Given everyone's concerns that his presentation still is worrisome for possible relapsing lymphoma with pleural effusion, I will go ahead and order repeat flow cytometry on the pleural effusion. I am expecting it to be negative. Mahnaz Fletcher. Cassi Deluca M.D., MPH  Co-Chair, Department of  Clinical [facility], [address] [place] [address]  Office [phone]  [place] [phone]. [email or web address]    \"Participating in a clinical trial is the first step to fighting cancer; not the last.\"

## 2019-08-01 NOTE — PROCEDURES
delayed swallow initiation, reduced epiglottic inversion, decreased laryngeal elevation, and reduced pharyngeal peristalsis. Symptoms:  · prolonged labored mastication with soft solids  · intermittent lingual rocking with all trials  · mildly reduced BOT retraction with all trials  · mild to moderate vallecular pooling across all trials  · mild to severe vallecular residue (increases with heavier viscosities)  · moderate posterior pharyngeal wall residue with puree and soft solid  · penetration (remains above laryngeal vestibule) before and during swallow with large bolus thin via cup, and during swallow with thin via straw. · NO aspiration observed  · Pt noted to have cough unrelated to penetration episodes throughout MBS study    Analysis of compensatory strategies:  · Liquid wash assists with clearance of pharyngeal residue after puree and mech soft/soft solids  · Verbal cue for small sips eliminated penetration with thin, although pt still took a fairly large bolus size     Fatigue likely exacerbates symptoms, resulting in decreased intake with soft solids. Most optimal with moist and chopped/minced soft solids. Dysphagia Score: Dysphagia Outcome Severity Scale: Level 5: Mild dysphagia- Distant supervision. May need one diet consistency restricted    Aspiration/Penetration Risk:   Penetration-Aspiration Scale (PAS): 2 - Material enters the airway, remains above the vocal folds, and is ejected from the airway    Diet Recommendations:  Solid consistency: dysphagia minced and moist   Liquid consistency:  Thin   Medication administration: PO     Compensatory Swallow Strategies:   Compensatory Swallowing Strategies: Small bites/sips, Alternate solids and liquids, Eat/Feed slowly, Remain upright for 30-45 minutes after meals, Upright as possible for all oral intake    Postural Swallow Strategies:  Medication administration: PO      Therapy:  Requires SLP Intervention: Yes     Referrals:

## 2019-08-01 NOTE — DISCHARGE INSTR - COC
Continuity of Care Form    Patient Name: Zahida Mckoy   :  1945  MRN:  [id number]    Admit date:  2019  Discharge date:  2019    Code Status Order: Full Code   Advance Directives:   [facility] Documentation     Date/Time Healthcare Directive Type of Healthcare Directive Copy in [address] Agent's Name Healthcare Agent's Phone Number    [phone]  No, patient does not have an advance directive for healthcare treatment -- -- -- -- --          Admitting Physician:  Duncan Guzman MD  PCP: Krzysztof Hi MD    Discharging Nurse: [address] [place] [address] Unit/Room#: A1M-6807/3523-95  Discharging Unit Phone Number: [phone]    Emergency Contact:   Extended Emergency Contact Information  Primary Emergency Contact: Jacky Masterson [address] Phone: [phone]  Relation: Child  Secondary Emergency Contact: Drew Clayton [address] Phone: [phone]  Relation: Child    Past Surgical History:  Past Surgical History:   Procedure Laterality Date    FINE NEEDLE ASPIRATION      JOINT REPLACEMENT Right 2010    right TKA Knee via Right    KNEE SURGERY      TUNNELED VENOUS PORT PLACEMENT Left 2016      Mickeal Holstein POWER PORT       Immunization History:   Immunization History   Administered Date(s) Administered    Pneumococcal Conjugate 13-valent ([id number]) 2016    Pneumococcal Polysaccharide ([id number]) 2018    Tdap (Boostrix, Adacel) 2019       Active Problems:  Patient Active Problem List   Diagnosis Code    Anasarca R60.1    Cerebral infarction ([facility] I63.9    Hypokalemia E87.6    Morbid obesity ([facility] E66.01    CHF (congestive heart failure) (HCC) I50.9    Abnormal stress test R94.39    Chronic atrial fibrillation (HCC) I48.2    Chronic diastolic congestive heart failure (HCC) I50.32    Bradycardia R00.1

## 2019-08-01 NOTE — PROGRESS NOTES
lymphoma ([facility] Back pain, Cancer ([facility] Cerebral infarction ([facility] CHF (congestive heart failure) ([facility] Hypertension, Hypokalemia, Infestation by bed bug, and Obesity. has a past surgical history that includes joint replacement (Right, 2010); knee surgery; fine needle aspiration; and Tunneled venous port placement (Left, 09/02/2016). Restrictions  Restrictions/Precautions  Restrictions/Precautions: Fall Risk  Position Activity Restriction  Other position/activity restrictions: 4 L O2, IV, rossi  Subjective   General  Chart Reviewed: Yes  Additional Pertinent Hx: Per Gideon Luther MD's consult note 7/28: Priscella Bence is a 69y.o. year old male with a history of a aphasia status post stroke, CHF, DLCBL who presents with frequent falls, worsening dysphagia, and shortness of breath. Patient states he has been falling a couple times a week chronically, but has increased in frequency more recently. Is on Xarelto for A. fib. This is been held for planned thoracentesis today. Patient is e currently is on 2 L of oxygen he does not normally have a home oxygen requirement. On arrival found to be hypoxic and CT scan of the chest showed a large right-sided pleural effusion. BNP also elevated to 2000. \"  Pt s/p thoracentesis 7/28. Family / Caregiver Present: No  Referring Practitioner: Chuyita Vega MD  Diagnosis: R pleural effusion, Acute Hypoxemic Respiratory Failure   Subjective  Subjective: Pt seen bedside and agreeable to EOB activity. Pt on 4L O2 throughout session pO2 ranged from 88-90s with activity.    General Comment  Comments: Per RN, ok for therapy      Orientation     Objective             Balance  Sitting Balance: Dependent/Total(sitting EOB 7 min with max of 1)  Bed mobility  Rolling to Left: 2 Person assistance;Maximum assistance  Rolling to Right: Maximum assistance;2 Person assistance  Supine to Sit: Dependent/Total(2-3 people with HOB elevated)  Sit to Supine: Maximum assistance;2 Person

## 2019-08-01 NOTE — PROGRESS NOTES
Physical Therapy  Facility/Department: [address] PROGRESSIVE CARE  Daily Treatment Note - COTX  This note serves as D/C summary if patient is discharged prior to next treatment session. NAME: Elaine Beatty  : 1945  MRN: [id number]    Date of Service: 2019    Discharge Recommendations:  Patient would benefit from continued therapy after discharge, 5-7 sessions per week   PT Equipment Recommendations  Other: will monitor    Assessment   Body structures, Functions, Activity limitations: Decreased functional mobility ; Decreased strength;Decreased balance;Decreased endurance  Assessment: Pt agreeable to EOB activity this afternoon. He required consistent Max A x 1, and intermittent assist of another to correct for posterolateral LOB to R. Pt limited in seated tolerance d/t fatigue and SOB, and could not tolerate transfer to bedside chair. He would benefit from continued therapy to improve his endurance, strength, and independence performing all mobility tasks. Continue to recommend low-moderate frequency therapy upon D/C. Elaine Beatty scored a 8/24 on the AM-PAC short mobility form. Current research shows that an AM-PAC score of 17 or less is typically not associated with a discharge to the patient's home setting. Based on the patients AM-PAC score and their current functional mobility deficits, it is recommended that the patient have 3-5 sessions per week of Physical Therapy at d/c to increase the patients independence. Treatment Diagnosis: weakness, impaired mobility  Specific instructions for Next Treatment: cotx  Decision Making: Medium Complexity  PT Education: Goals;Transfer Training;Home Exercise Program;Gait Training;Functional Mobility Training;Equipment; Family Education  Patient Education: role of acute care PT; technique to correct sitting balance  Activity Tolerance  Activity Tolerance: Patient limited by fatigue;Patient limited by endurance     Patient Diagnosis(es): The

## 2019-08-01 NOTE — PROGRESS NOTES
Pulmonary Progress Note    Date of Admission: 7/27/2019   LOS: 5 days     Chief Complaint   Patient presents with    Aphasia     x 1 week, worse today at approx 1815    Cough     congested cough, wheezing       Assessment:     Pleural effusion, Right - Leukocyte predominant exudate, with abundant eos. Acute Hypoxemic Respiratory Failure with SAO2 <90% on Room Air  Acute Pulmonary Edema  Dysarthria  History of stroke with chronic neurologic deficits  Atrial fibrillation on Xarelto  CHF  Diffuse large B-cell lymphoma  Plan:    -Most consistent with traumatic sequelae, especially with eosinophilic phenotype. also consider malignancy given lymphocytic predominance and DLBCL diagnosis, flow cytology unremarkable.  -plan for Chest tube tmw for rapidly reaccumulating exudative effusion, holding anticoagulation. PFA HCT 4%, but high RBC  -cont duonebs and dulera  -repeat ECHO with dilated RA and RV, pHTN  -good uop, would benefit from continued diuresis. -Wean supplemental oxygen to goal saturation of >90%      24 Hour Events/Subjective  o2 weaned to 4L currently. Mild SOB, otherwise no acute complaints. ROS:   No nausea  No Vomiting  No chest pain      Intake/Output Summary (Last 24 hours) at 8/1/2019 0856  Last data filed at 8/1/2019 0401  Gross per 24 hour   Intake 840 ml   Output 3000 ml   Net -2160 ml         PHYSICAL EXAM:   Blood pressure 121/68, pulse 89, temperature 97.6 °F (36.4 °C), temperature source Oral, resp. rate 18, height 6' 1\" (1.854 m), weight 264 lb 8.8 oz (120 kg), SpO2 98 %.'  Gen:  No acute distress. Eyes: PERRL. Anicteric sclera. No conjunctival injection. ENT: No discharge. Posterior oropharynx clear. External appearance of ears and nose normal.  Neck: Trachea midline. No mass   Resp:  Diminished bs at bilateral bases. + crackles. No wheezes. No rhonchi. + dullness on percussion right base. CV: Regular rate. Regular rhythm. No murmur or rub. No edema. GI: Soft, Non-tender. Non-distended. +BS  Skin: Warm, dry, w/o erythema. Lymph: No cervical or supraclavicular LAD. M/S: No cyanosis. No clubbing. Neuro:  Chronic neuro deficits including dysarthria, aphasia. Moves all extremities. More awake and alert today. Medications:    Scheduled Meds:   mometasone-formoterol  2 puff Inhalation BID    ipratropium-albuterol  1 ampule Inhalation Q4H WA    furosemide  40 mg Intravenous Daily    miconazole   Topical BID    [Held by provider] rivaroxaban  20 mg Oral Daily with breakfast    [Held by provider] aspirin  81 mg Oral Daily    metoprolol succinate  25 mg Oral Daily    sodium chloride flush  10 mL Intravenous 2 times per day       Continuous Infusions:   sodium chloride 75 mL/hr (07/31/19 0406)       PRN Meds:  acetaminophen, sodium chloride flush, magnesium hydroxide, ondansetron    Labs reviewed:  CBC:   Recent Labs     07/30/19  1128   WBC 9.6   HGB 10.5*   HCT 32.8*   .3*        BMP:   Recent Labs     07/30/19  1128      K 5.3*      CO2 31   PHOS 2.8   BUN 13   CREATININE 0.6*     LIVER PROFILE:   No results for input(s): AST, ALT, LIPASE, BILIDIR, BILITOT, ALKPHOS in the last 72 hours. Invalid input(s): AMYLASE,  ALB  PT/INR: No results for input(s): PROTIME, INR in the last 72 hours. APTT: No results for input(s): APTT in the last 72 hours. UA:  No results for input(s): NITRITE, COLORU, PHUR, LABCAST, WBCUA, RBCUA, MUCUS, TRICHOMONAS, YEAST, BACTERIA, CLARITYU, SPECGRAV, LEUKOCYTESUR, UROBILINOGEN, BILIRUBINUR, BLOODU, GLUCOSEU, AMORPHOUS in the last 72 hours. Invalid input(s): Tray Cole  No results for input(s): PH, PCO2, PO2 in the last 72 hours. Films:  Radiology Review:  Pertinent images / reports were reviewed as a part of this visit. CT Chest w/ contrast: No results found for this or any previous visit.     CT Chest w/o contrast:   Results for orders placed during the hospital encounter of 07/29/16   CT Chest WO Contrast

## 2019-08-01 NOTE — PROGRESS NOTES
[facility]   Speech Therapy  Daily Dysphagia Treatment Note    Aminta Nurse  AGE: 69 y.o. GENDER: male  : 1945  [id number]  EPISODE DATE:  2019     Patient Active Problem List   Diagnosis    Anasarca    C[facility]bral infarction ([facility]    Hypokalemia    Morbid obesity ([facility]    CHF (congestive heart failure) (HCC)    Abnormal stress test    Chronic atrial fibrillation (HCC)    Chronic diastolic congestive heart failure (HCC)    Bradycardia    Diffuse large B-cell lymphoma of lymph nodes of neck ([facility]    Debility    Cerebrovascular accident (CVA) ([facility]    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 Admission H&P HPI:  69 y. o. male who presented to [facility] [place] with increasing shortness of breath.  Patient states he has had dyspnea over the last week.  Patient states he has had recurrent falls.  Patient is on Xarelto therapy for A. fib.  Patient states cough has been nonproductive.  He denies hemoptysis. Patient has some communication issues related to prior stroke. [facility] has right hemiparesis at baseline  2019 thoracentesis  2019 worsening respiratory status charted  2019 BSE ordered, but not completed d/t BiPAP need  2019 CXR:  Impression   Large right pleural effusion which has increased in size since 2019.    Moderate atelectasis in the right lung.  Slight pulmonary vascular congestion   improved from the prior study.       RECOMMENDATION:   Patient may benefit from ultrasound-guided thoracentesis.       2019 plan for chest tube placement       Subjective:     Current Diet : Regular  Current Liquid Diet : Thin    Comments regarding tolerating Current Diet:

## 2019-08-02 ENCOUNTER — APPOINTMENT (OUTPATIENT)
Dept: CT IMAGING | Age: 74
DRG: 432 | End: 2019-08-02
Payer: MEDICARE

## 2019-08-02 LAB
ALBUMIN SERPL-MCNC: 2.7 G/DL (ref 3.4–5)
ANION GAP SERPL CALCULATED.3IONS-SCNC: 5 MMOL/L (ref 3–16)
APPEARANCE FLUID: NORMAL
BLOOD CULTURE, ROUTINE: NORMAL
BUN BLDV-MCNC: 13 MG/DL (ref 7–20)
CALCIUM SERPL-MCNC: 8.6 MG/DL (ref 8.3–10.6)
CELL COUNT FLUID TYPE: NORMAL
CHLORIDE BLD-SCNC: 95 MMOL/L (ref 99–110)
CLOT EVALUATION: NORMAL
CO2: 38 MMOL/L (ref 21–32)
COLOR FLUID: NORMAL
CREAT SERPL-MCNC: 0.5 MG/DL (ref 0.8–1.3)
CULTURE, BLOOD 2: NORMAL
EOSINOPHIL FLUID: 8 %
FLUID TYPE: NORMAL
GFR AFRICAN AMERICAN: >60
GFR NON-AFRICAN AMERICAN: >60
GLUCOSE BLD-MCNC: 114 MG/DL (ref 70–99)
INR BLD: 1.31 (ref 0.86–1.14)
LD, FLUID: 221 U/L
LYMPHOCYTES, BODY FLUID: 36 %
MACROPHAGE FLUID: 4 %
MESOTHELIAL FLUID: 1 %
MONOCYTE, FLUID: 5 %
NEUTROPHIL, FLUID: 46 %
NUCLEATED CELLS FLUID: 533 /CUMM
NUMBER OF CELLS COUNTED FLUID: 100
PHOSPHORUS: 2.2 MG/DL (ref 2.5–4.9)
POTASSIUM SERPL-SCNC: 4.6 MMOL/L (ref 3.5–5.1)
PROTEIN FLUID: 2.9 G/DL
PROTHROMBIN TIME: 14.9 SEC (ref 9.8–13)
RBC FLUID: NORMAL /CUMM
SODIUM BLD-SCNC: 138 MMOL/L (ref 136–145)

## 2019-08-02 PROCEDURE — 83615 LACTATE (LD) (LDH) ENZYME: CPT

## 2019-08-02 PROCEDURE — 85610 PROTHROMBIN TIME: CPT

## 2019-08-02 PROCEDURE — 84157 ASSAY OF PROTEIN OTHER: CPT

## 2019-08-02 PROCEDURE — 2700000000 HC OXYGEN THERAPY PER DAY

## 2019-08-02 PROCEDURE — 0W9930Z DRAINAGE OF RIGHT PLEURAL CAVITY WITH DRAINAGE DEVICE, PERCUTANEOUS APPROACH: ICD-10-PCS | Performed by: INTERNAL MEDICINE

## 2019-08-02 PROCEDURE — 88185 FLOWCYTOMETRY/TC ADD-ON: CPT

## 2019-08-02 PROCEDURE — 88184 FLOWCYTOMETRY/ TC 1 MARKER: CPT

## 2019-08-02 PROCEDURE — 89051 BODY FLUID CELL COUNT: CPT

## 2019-08-02 PROCEDURE — 88305 TISSUE EXAM BY PATHOLOGIST: CPT

## 2019-08-02 PROCEDURE — 97530 THERAPEUTIC ACTIVITIES: CPT

## 2019-08-02 PROCEDURE — 6360000002 HC RX W HCPCS: Performed by: RADIOLOGY

## 2019-08-02 PROCEDURE — 97127 HC SP THER IVNTJ W/FOCUS COG FUNCJ: CPT

## 2019-08-02 PROCEDURE — 92526 ORAL FUNCTION THERAPY: CPT

## 2019-08-02 PROCEDURE — 94640 AIRWAY INHALATION TREATMENT: CPT

## 2019-08-02 PROCEDURE — 2709999900 CT GUIDED CHEST TUBE

## 2019-08-02 PROCEDURE — 2060000000 HC ICU INTERMEDIATE R&B

## 2019-08-02 PROCEDURE — 94760 N-INVAS EAR/PLS OXIMETRY 1: CPT

## 2019-08-02 PROCEDURE — 77012 CT SCAN FOR NEEDLE BIOPSY: CPT

## 2019-08-02 PROCEDURE — 80069 RENAL FUNCTION PANEL: CPT

## 2019-08-02 PROCEDURE — 99233 SBSQ HOSP IP/OBS HIGH 50: CPT | Performed by: INTERNAL MEDICINE

## 2019-08-02 PROCEDURE — 94660 CPAP INITIATION&MGMT: CPT

## 2019-08-02 PROCEDURE — 88112 CYTOPATH CELL ENHANCE TECH: CPT

## 2019-08-02 PROCEDURE — 6370000000 HC RX 637 (ALT 250 FOR IP): Performed by: INTERNAL MEDICINE

## 2019-08-02 PROCEDURE — 6370000000 HC RX 637 (ALT 250 FOR IP): Performed by: NURSE PRACTITIONER

## 2019-08-02 PROCEDURE — 6360000002 HC RX W HCPCS: Performed by: INTERNAL MEDICINE

## 2019-08-02 PROCEDURE — 94761 N-INVAS EAR/PLS OXIMETRY MLT: CPT

## 2019-08-02 PROCEDURE — 2580000003 HC RX 258: Performed by: INTERNAL MEDICINE

## 2019-08-02 PROCEDURE — 97110 THERAPEUTIC EXERCISES: CPT

## 2019-08-02 RX ORDER — MIDAZOLAM HYDROCHLORIDE 1 MG/ML
INJECTION INTRAMUSCULAR; INTRAVENOUS DAILY PRN
Status: COMPLETED | OUTPATIENT
Start: 2019-08-02 | End: 2019-08-02

## 2019-08-02 RX ORDER — FENTANYL CITRATE 50 UG/ML
INJECTION, SOLUTION INTRAMUSCULAR; INTRAVENOUS DAILY PRN
Status: COMPLETED | OUTPATIENT
Start: 2019-08-02 | End: 2019-08-02

## 2019-08-02 RX ADMIN — SODIUM CHLORIDE, PRESERVATIVE FREE 10 ML: 5 INJECTION INTRAVENOUS at 21:31

## 2019-08-02 RX ADMIN — MICONAZOLE NITRATE: 20 POWDER TOPICAL at 10:24

## 2019-08-02 RX ADMIN — FUROSEMIDE 40 MG: 10 INJECTION, SOLUTION INTRAMUSCULAR; INTRAVENOUS at 08:48

## 2019-08-02 RX ADMIN — ACETAMINOPHEN 650 MG: 325 TABLET ORAL at 14:36

## 2019-08-02 RX ADMIN — FENTANYL CITRATE 25 MCG: 50 INJECTION INTRAMUSCULAR; INTRAVENOUS at 09:26

## 2019-08-02 RX ADMIN — SODIUM CHLORIDE, PRESERVATIVE FREE 10 ML: 5 INJECTION INTRAVENOUS at 08:48

## 2019-08-02 RX ADMIN — IPRATROPIUM BROMIDE AND ALBUTEROL SULFATE 1 AMPULE: .5; 3 SOLUTION RESPIRATORY (INHALATION) at 15:33

## 2019-08-02 RX ADMIN — METOPROLOL SUCCINATE 25 MG: 25 TABLET, FILM COATED, EXTENDED RELEASE ORAL at 08:52

## 2019-08-02 RX ADMIN — IPRATROPIUM BROMIDE AND ALBUTEROL SULFATE 1 AMPULE: .5; 3 SOLUTION RESPIRATORY (INHALATION) at 12:08

## 2019-08-02 RX ADMIN — IPRATROPIUM BROMIDE AND ALBUTEROL SULFATE 1 AMPULE: .5; 3 SOLUTION RESPIRATORY (INHALATION) at 19:32

## 2019-08-02 RX ADMIN — MICONAZOLE NITRATE: 20 POWDER TOPICAL at 21:31

## 2019-08-02 RX ADMIN — ACETAMINOPHEN 650 MG: 325 TABLET ORAL at 23:19

## 2019-08-02 RX ADMIN — MIDAZOLAM 0.5 MG: 1 INJECTION INTRAMUSCULAR; INTRAVENOUS at 09:27

## 2019-08-02 RX ADMIN — IPRATROPIUM BROMIDE AND ALBUTEROL SULFATE 1 AMPULE: .5; 3 SOLUTION RESPIRATORY (INHALATION) at 08:17

## 2019-08-02 RX ADMIN — MOMETASONE FUROATE AND FORMOTEROL FUMARATE DIHYDRATE 2 PUFF: 200; 5 AEROSOL RESPIRATORY (INHALATION) at 08:18

## 2019-08-02 ASSESSMENT — PAIN SCALES - GENERAL
PAINLEVEL_OUTOF10: 1
PAINLEVEL_OUTOF10: 4
PAINLEVEL_OUTOF10: 0
PAINLEVEL_OUTOF10: 0
PAINLEVEL_OUTOF10: 3
PAINLEVEL_OUTOF10: 0
PAINLEVEL_OUTOF10: 8

## 2019-08-02 ASSESSMENT — PAIN DESCRIPTION - ONSET: ONSET: GRADUAL

## 2019-08-02 ASSESSMENT — PAIN DESCRIPTION - LOCATION: LOCATION: BACK

## 2019-08-02 ASSESSMENT — PAIN DESCRIPTION - DESCRIPTORS: DESCRIPTORS: ACHING

## 2019-08-02 ASSESSMENT — PAIN DESCRIPTION - FREQUENCY: FREQUENCY: CONTINUOUS

## 2019-08-02 ASSESSMENT — PAIN DESCRIPTION - ORIENTATION: ORIENTATION: RIGHT

## 2019-08-02 ASSESSMENT — PAIN DESCRIPTION - PAIN TYPE: TYPE: ACUTE PAIN

## 2019-08-02 NOTE — PROGRESS NOTES
Nutrition Assessment    Type and Reason for Visit: Initial(los)    Nutrition Recommendations:   Monitor ed needs    Nutrition Assessment: Pt at risk for nutrition compromise r/t cardiac dysfunction, altered respiratory status, aphasia, need for altered texture (MBS 8/1 recommending Dysphagia ll / thin liquids), altered skin integrity. Diet offered includes 2 gm Na / 1500 ml / Dental Soft. Pt currently npo this date for chest tube placement but reported that intake is good. Malnutrition Assessment:  · Malnutrition Status: No malnutrition    Nutrition Risk Level: Moderate    Nutrient Needs:  · Estimated Daily Total Kcal: 2721-8904 (15-18 x  kg)  · Estimated Daily Protein (g): 101-168 (1.2-2 x IBW 84 kg)  · Estimated Daily Total Fluid (ml/day): 1500 per MD orders    Nutrition Diagnosis:   · Problem: Biting/chewing difficulty  · Etiology: related to Alteration in GI function     Signs and symptoms:  as evidenced by (need for altered texture)    Objective Information:  · Nutrition-Focused Physical Findings: BM 7/30.  Noted +1 edema to BLE  · Wound Type: Multiple(excoriated danyell area, stage ll to coccyx)  · Current Nutrition Therapies:  · Oral Diet Orders: 2gm Sodium, Fluid Restriction, Dental Soft   · Oral Diet intake: 51-75%, %  · Anthropometric Measures:  · Ht: 6' 1\" (185.4 cm)   · Current Body Wt: 265 lb (120.2 kg)  · Admission Body Wt: 259 lb (117.5 kg)  · Ideal Body Wt: 184 lb (83.5 kg), % Ideal Body    · BMI Classification: BMI 35.0 - 39.9 Obese Class II    Nutrition Interventions:   Continue current diet  Continued Inpatient Monitoring, Education not appropriate at this time    Nutrition Evaluation:   · Evaluation: Goals set   · Goals: tolerate most appropriate form of nutrition    · Monitoring: Meal Intake, Diet Tolerance, Skin Integrity, Weight, Pertinent Labs, Chewing/Swallowing, Diarrhea, Constipation, Patient/Family Education      Electronically signed by Familia Dinh RD, LD on 8/2/19 at

## 2019-08-02 NOTE — PRE SEDATION
Silvaan Anderson MD   rivaroxaban (XARELTO) 20 MG TABS tablet TAKE 1 TABLET BY MOUTH DAILY 5/22/19  Yes Timo Brown MD   lisinopril (PRINIVIL;ZESTRIL) 5 MG tablet TAKE 1 TABLET BY MOUTH EVERY DAY 5/22/19  Yes Timo Brown MD   potassium chloride (KLOR-CON M) 20 MEQ extended release tablet TAKE 1 TABLET BY MOUTH TWICE DAILY 5/22/19  Yes Timo Brown MD   torsemide (DEMADEX) 20 MG tablet TAKE 2 TABLETS BY MOUTH TWICE DAILY 5/22/19  Yes Timo Brown MD   clotrimazole (LOTRIMIN) 1 % cream APPLY EXTERNALLY TO THE AFFECTED AREA TWICE DAILY  Patient taking differently: Apply 1 each topically 2 times daily as needed (irritation / fungal infection)  12/17/18  Yes Silvana Anderson MD   ASPIRIN LOW DOSE 81 MG EC tablet TAKE 1 TABLET BY MOUTH DAILY 11/6/18  Yes Timo Brown MD   metoprolol succinate (TOPROL XL) 25 MG extended release tablet TAKE 1 TABLET BY MOUTH DAILY 9/19/18  Yes Timo Brown MD   ferrous sulfate (BEST-GLORIA) 325 (65 Fe) MG tablet Take 1 tablet by mouth 2 times daily 2/1/18  Yes Silvana Anderson MD   spironolactone (ALDACTONE) 25 MG tablet TAKE 1 TABLET BY MOUTH DAILY 5/19/17  Yes Timo Brown MD   acetaminophen (TYLENOL) 325 MG tablet Take 650 mg by mouth every 6 hours as needed for Pain    Yes Historical Provider, MD     Coumadin Use Last 7 Days:  no  Antiplatelet drug therapy use last 7 days: no  Other anticoagulant use last 7 days: yes - xarelto held  Additional Medication Information:        Pre-Sedation Documentation and Exam:   I have reviewed the patient's history and review of systems.     Mallampati Airway Assessment:  Mallampati Class II - (soft palate, fauces & uvula are visible)    Prior History of Anesthesia Complications:   none    ASA Classification:  Class 2 - A normal healthy patient with mild systemic disease    Sedation/ Anesthesia Plan:   intravenous sedation    Medications Planned:   midazolam (Versed) intravenously and fentanyl intravenously    Patient is

## 2019-08-02 NOTE — PROGRESS NOTES
Pleural effusion was also pertinent to this visit. has a past medical history of Atrial fibrillation ([address] B-cell lymphoma ([facility] [address] Back pain, Cancer ([address] Cerebral infarction ([address] CHF (congestive heart failure) ([facility] [address] Hypertension, Hypokalemia, Infestation by bed bug, and Obesity. has a past surgical history that includes joint replacement (Right, 2010); knee surgery; fine needle aspiration; and Tunneled venous port placement (Left, 09/02/2016). Restrictions  Restrictions/Precautions  Restrictions/Precautions: Fall Risk  Position Activity Restriction  Other position/activity restrictions: 4 L O2, rossi, chest tube  Subjective   General  Chart Reviewed: Yes  Additional Pertinent Hx: Per Jose Juan Nixon MD's consult note 7/28: Mariah Kingston is a 69y.o. year old male with a history of a aphasia status post stroke, CHF, DLCBL who presents with frequent falls, worsening dysphagia, and shortness of breath. Patient states he has been falling a couple times a week chronically, but has increased in frequency more recently. Is on Xarelto for A. fib. This is been held for planned thoracentesis today. Patient is e currently is on 2 L of oxygen he does not normally have a home oxygen requirement. On arrival found to be hypoxic and CT scan of the chest showed a large right-sided pleural effusion. BNP also elevated to 2000. \"  Pt s/p thoracentesis 7/28. Family / Caregiver Present: No  Referring Practitioner: Ulises Pompa MD  Diagnosis: R pleural effusion, Acute Hypoxemic Respiratory Failure , CT placement 8/2  Subjective  Subjective: Pt seen bedside and agreeable to therapy. Vitals monitored and stable throughout session.    General Comment  Comments: Per RN ok for therapy, no restrictions following chest tube placement      Objective    Balance  Sitting Balance: (ranged from CGA-max A throughout session)  Standing Balance: (ranged min- max with robson johnson )  Standing Balance  Time: stood 5x: ~10 sec 2x, 15 Minutes 30         Timed Code Treatment Minutes: 30 Minutes     This note to serve as OT d/c summary if pt is d/c-ed prior to next therapy session.     Kalie Jaeger, OTR/L

## 2019-08-02 NOTE — PLAN OF CARE
Nutrition Problem: Biting/chewing difficulty  Intervention: Food and/or Nutrient Delivery: Continue current diet  Nutritional Goals: tolerate most appropriate form of nutrition

## 2019-08-02 NOTE — PROGRESS NOTES
trial he required Max A x 1, and Mod A of another to achieve upright stance. Standing trials lasted 05 s. x 2, 15 s., 30 s., 50 s. Pt required cues to achieve erect posture, and was limited in standing tolerance d/t fatigue. Other Activities: Other (see comment)  Comment: Pt positioned for comfort in bed at end of session (declined sitting in chair). AM-PAC Score  AM-PAC Inpatient Mobility Raw Score : 8 (08/01/19 1408)  AM-PAC Inpatient T-Scale Score : 28.52 (08/01/19 1408)  Mobility Inpatient CMS 0-100% Score: 86.62 (08/01/19 1408)  Mobility Inpatient CMS G-Code Modifier : CM (08/01/19 1408)          Goals  Short term goals  Time Frame for Short term goals: upon d/c  Short term goal 1: Bed mobility with mod A of 2. - ongoing 8/2  Short term goal 2: Seated EOB x 2-3 minutes with min/mod A of 1-2. - met 8/2 - progress to sitting EOB independently. Short term goal 3: Pivot transfer bed <> chair with mod A of 1-2. - ongoing 8/2  Short term goal 4: Propel w/c with min A 25 feet. - ogoing 8/2  Patient Goals   Patient goals : the pt unable to give a personal therapy goal    Plan    Plan  Times per week: 3-5x/week  Specific instructions for Next Treatment: cotx  Current Treatment Recommendations: Strengthening, ROM, Functional Mobility Training, Balance Training, Endurance Training, Transfer Training, Gait Training, Stair training, Neuromuscular Re-education, Home Exercise Program, Safety Education & Training, Patient/Caregiver Education & Training, Equipment Evaluation, Education, & procurement  Safety Devices  Type of devices:  All fall risk precautions in place, Call light within reach, Bed alarm in place, Left in bed, Nurse notified, Gait belt  Restraints  Initially in place: No     Therapy Time   Individual Concurrent Group Co-treatment   Time In       1100   Time Out       1130   Minutes       30   Timed Code Treatment Minutes: [facility] Son PT    Electronically signed by

## 2019-08-02 NOTE — CARE COORDINATION
Pt's daughter and grandaughter present in the room. Both family members are still in agreement that when pt is medically stable they would like to proceed with going to [facility] for rehab. Pt's grandaughter stated that she found out the patient's BRYCE  in April and she is in the process of redoing his application for BRYCE. Pt's granddaughter does not have a POA.   Advised family this may be important paperwork to establish if she is going to be handling and/or helping with his medical care when the patient cannot    Mile Alonso RN  Case Management

## 2019-08-02 NOTE — PROGRESS NOTES
Hospitalist Progress Note      PCP: Nas Guevara MD    Date of Admission: 7/27/2019    Chief Complaint: shortness of breath    Subjective: Pt seen and examined. States his breathing is better today after chest tube insertion. It appears as if there is 2100 mL's out as of right now. Medications:  Reviewed    Infusion Medications    sodium chloride 75 mL/hr (07/31/19 0406)     Scheduled Medications    mometasone-formoterol  2 puff Inhalation BID    ipratropium-albuterol  1 ampule Inhalation Q4H WA    furosemide  40 mg Intravenous Daily    miconazole   Topical BID    [Held by provider] rivaroxaban  20 mg Oral Daily with breakfast    [Held by provider] aspirin  81 mg Oral Daily    metoprolol succinate  25 mg Oral Daily    sodium chloride flush  10 mL Intravenous 2 times per day     PRN Meds: acetaminophen, sodium chloride flush, magnesium hydroxide, ondansetron      Intake/Output Summary (Last 24 hours) at 8/2/2019 0822  Last data filed at 8/2/2019 0435  Gross per 24 hour   Intake 960 ml   Output 1650 ml   Net -690 ml       Exam:    /82   Pulse 101   Temp 98.4 °F (36.9 °C)   Resp 24   Ht 6' 1\" (1.854 m)   Wt 263 lb 10.7 oz (119.6 kg)   SpO2 94%   BMI 34.79 kg/m²     General appearance:appears stated age and cooperative. HEENT: Pupils equal, round, and reactive to light. Conjunctivae/corneas clear. Neck: Supple, with full range of motion. No jugular venous distention. Trachea midline. Respiratory:  Diminished bs bl, worse on R  Cardiovascular: Regular rate and rhythm with normal S1/S2 without murmurs, rubs or gallops. Abdomen: Soft, non-tender, non-distended with normal bowel sounds. Musculoskeletal: No clubbing, cyanosis or edema bilaterally. Full range of motion without deformity. Skin: Skin color, texture, turgor normal.  No rashes or lesions.   Neurologic: Positive aphasia, improved from yesterday  Psychiatric: Alert and oriented  Capillary Refill: Brisk,< 3 seconds

## 2019-08-03 ENCOUNTER — APPOINTMENT (OUTPATIENT)
Dept: GENERAL RADIOLOGY | Age: 74
DRG: 432 | End: 2019-08-03
Payer: MEDICARE

## 2019-08-03 LAB
ALBUMIN SERPL-MCNC: 2.8 G/DL (ref 3.4–5)
ANION GAP SERPL CALCULATED.3IONS-SCNC: 5 MMOL/L (ref 3–16)
BUN BLDV-MCNC: 14 MG/DL (ref 7–20)
CALCIUM SERPL-MCNC: 8.6 MG/DL (ref 8.3–10.6)
CHLORIDE BLD-SCNC: 97 MMOL/L (ref 99–110)
CO2: 40 MMOL/L (ref 21–32)
CREAT SERPL-MCNC: <0.5 MG/DL (ref 0.8–1.3)
GFR AFRICAN AMERICAN: >60
GFR NON-AFRICAN AMERICAN: >60
GLUCOSE BLD-MCNC: 122 MG/DL (ref 70–99)
PHOSPHORUS: 3.1 MG/DL (ref 2.5–4.9)
POTASSIUM SERPL-SCNC: 4.3 MMOL/L (ref 3.5–5.1)
SODIUM BLD-SCNC: 142 MMOL/L (ref 136–145)

## 2019-08-03 PROCEDURE — 6360000002 HC RX W HCPCS: Performed by: INTERNAL MEDICINE

## 2019-08-03 PROCEDURE — 2700000000 HC OXYGEN THERAPY PER DAY

## 2019-08-03 PROCEDURE — 36415 COLL VENOUS BLD VENIPUNCTURE: CPT

## 2019-08-03 PROCEDURE — 94640 AIRWAY INHALATION TREATMENT: CPT

## 2019-08-03 PROCEDURE — 2060000000 HC ICU INTERMEDIATE R&B

## 2019-08-03 PROCEDURE — 6370000000 HC RX 637 (ALT 250 FOR IP): Performed by: INTERNAL MEDICINE

## 2019-08-03 PROCEDURE — 2580000003 HC RX 258: Performed by: INTERNAL MEDICINE

## 2019-08-03 PROCEDURE — 71045 X-RAY EXAM CHEST 1 VIEW: CPT

## 2019-08-03 PROCEDURE — 94761 N-INVAS EAR/PLS OXIMETRY MLT: CPT

## 2019-08-03 PROCEDURE — 80069 RENAL FUNCTION PANEL: CPT

## 2019-08-03 PROCEDURE — 6370000000 HC RX 637 (ALT 250 FOR IP): Performed by: NURSE PRACTITIONER

## 2019-08-03 PROCEDURE — 99233 SBSQ HOSP IP/OBS HIGH 50: CPT | Performed by: INTERNAL MEDICINE

## 2019-08-03 RX ORDER — FUROSEMIDE 10 MG/ML
40 INJECTION INTRAMUSCULAR; INTRAVENOUS 2 TIMES DAILY
Status: DISCONTINUED | OUTPATIENT
Start: 2019-08-03 | End: 2019-08-06 | Stop reason: HOSPADM

## 2019-08-03 RX ORDER — HYDROCODONE BITARTRATE AND ACETAMINOPHEN 5; 325 MG/1; MG/1
1 TABLET ORAL EVERY 6 HOURS PRN
Status: DISCONTINUED | OUTPATIENT
Start: 2019-08-03 | End: 2019-08-06 | Stop reason: HOSPADM

## 2019-08-03 RX ADMIN — MICONAZOLE NITRATE: 20 POWDER TOPICAL at 08:36

## 2019-08-03 RX ADMIN — IPRATROPIUM BROMIDE AND ALBUTEROL SULFATE 1 AMPULE: .5; 3 SOLUTION RESPIRATORY (INHALATION) at 08:27

## 2019-08-03 RX ADMIN — RIVAROXABAN 20 MG: 20 TABLET, FILM COATED ORAL at 12:00

## 2019-08-03 RX ADMIN — HYDROCODONE BITARTRATE AND ACETAMINOPHEN 1 TABLET: 5; 325 TABLET ORAL at 11:12

## 2019-08-03 RX ADMIN — FUROSEMIDE 40 MG: 10 INJECTION, SOLUTION INTRAMUSCULAR; INTRAVENOUS at 20:25

## 2019-08-03 RX ADMIN — METOPROLOL SUCCINATE 25 MG: 25 TABLET, FILM COATED, EXTENDED RELEASE ORAL at 08:26

## 2019-08-03 RX ADMIN — ACETAMINOPHEN 650 MG: 325 TABLET ORAL at 08:26

## 2019-08-03 RX ADMIN — SODIUM CHLORIDE, PRESERVATIVE FREE 10 ML: 5 INJECTION INTRAVENOUS at 20:26

## 2019-08-03 RX ADMIN — MICONAZOLE NITRATE: 20 POWDER TOPICAL at 20:26

## 2019-08-03 RX ADMIN — IPRATROPIUM BROMIDE AND ALBUTEROL SULFATE 1 AMPULE: .5; 3 SOLUTION RESPIRATORY (INHALATION) at 16:05

## 2019-08-03 RX ADMIN — HYDROCODONE BITARTRATE AND ACETAMINOPHEN 1 TABLET: 5; 325 TABLET ORAL at 20:25

## 2019-08-03 RX ADMIN — IPRATROPIUM BROMIDE AND ALBUTEROL SULFATE 1 AMPULE: .5; 3 SOLUTION RESPIRATORY (INHALATION) at 12:00

## 2019-08-03 RX ADMIN — IPRATROPIUM BROMIDE AND ALBUTEROL SULFATE 1 AMPULE: .5; 3 SOLUTION RESPIRATORY (INHALATION) at 19:53

## 2019-08-03 RX ADMIN — FUROSEMIDE 40 MG: 10 INJECTION, SOLUTION INTRAMUSCULAR; INTRAVENOUS at 08:27

## 2019-08-03 RX ADMIN — ASPIRIN 81 MG: 81 TABLET ORAL at 12:00

## 2019-08-03 ASSESSMENT — PAIN DESCRIPTION - DESCRIPTORS: DESCRIPTORS: BURNING;ACHING

## 2019-08-03 ASSESSMENT — PAIN SCALES - GENERAL
PAINLEVEL_OUTOF10: 5
PAINLEVEL_OUTOF10: 0
PAINLEVEL_OUTOF10: 6
PAINLEVEL_OUTOF10: 4

## 2019-08-03 ASSESSMENT — PAIN DESCRIPTION - FREQUENCY: FREQUENCY: CONTINUOUS

## 2019-08-03 ASSESSMENT — PAIN DESCRIPTION - PAIN TYPE
TYPE: ACUTE PAIN

## 2019-08-03 ASSESSMENT — PAIN DESCRIPTION - LOCATION
LOCATION: BACK
LOCATION: BACK

## 2019-08-03 ASSESSMENT — PAIN DESCRIPTION - ONSET: ONSET: GRADUAL

## 2019-08-03 ASSESSMENT — PAIN DESCRIPTION - ORIENTATION: ORIENTATION: RIGHT

## 2019-08-03 ASSESSMENT — PAIN - FUNCTIONAL ASSESSMENT: PAIN_FUNCTIONAL_ASSESSMENT: ACTIVITIES ARE NOT PREVENTED

## 2019-08-03 ASSESSMENT — PAIN DESCRIPTION - PROGRESSION: CLINICAL_PROGRESSION: GRADUALLY WORSENING

## 2019-08-03 NOTE — PROGRESS NOTES
[P.O.:720]  Out: 5365 [Urine:2975; Chest Tube:2390]  No intake/output data recorded. /68   Pulse 114   Temp 97.7 °F (36.5 °C) (Oral)   Resp 20   Ht 6' 1\" (1.854 m)   Wt 259 lb 7.7 oz (117.7 kg)   SpO2 97%   BMI 34.23 kg/m²     General Appearance:    Alert, cooperative, no distress, appears stated age   Head:    Normocephalic, without obvious abnormality, atraumatic   Eyes:    PERRL, conjunctiva/corneas clear, EOM's intact, fundi     benign, both eyes        Ears:    Normal TM's and external ear canals, both ears   Nose:   Nares normal, septum midline, mucosa normal, no drainage    or sinus tenderness   Throat:   Lips, mucosa, and tongue normal; teeth and gums normal   Neck:   Supple, symmetrical, trachea midline, no adenopathy;        thyroid:  No enlargement/tenderness/nodules; no carotid    bruit or JVD   Back:     Symmetric, no curvature, ROM normal, no CVA tenderness   Lungs:     Decreased BSs right base   Chest wall:    No tenderness or deformity   Heart:    Regular rate and rhythm, S1 and S2 normal, no murmur, rub   or gallop   Abdomen:     Soft, non-tender, bowel sounds active all four quadrants,     no masses, no organomegaly           Extremities:   Extremities normal, atraumatic, no cyanosis or edema   Pulses:   2+ and symmetric all extremities   Skin:   Skin color, texture, turgor normal, no rashes or lesions   Lymph nodes:   Cervical, supraclavicular, and axillary nodes normal   Neurologic:   Stable         Data Review  CBC:   Lab Results   Component Value Date    WBC 9.6 07/30/2019    RBC 3.06 07/30/2019    RBC 3.84 06/01/2017       Assessment:     Active Problems:    Diffuse large B-cell lymphoma (HCC)    Pleural effusion    Acute respiratory failure with hypoxia (HCC)    Chest wall pain  Resolved Problems:    * No resolved hospital problems. *      Plan:     1. History DLBCL/new right pleural effusion. Cytology/flow cytometry negative. Repeat is pending.     2. ID--AF        Electronically signed by Daniel Atkins MD on 8/3/2019 at 11:18 AM

## 2019-08-03 NOTE — PROGRESS NOTES
More alert. Requesting for mask to be off and food. Placed back on nasal canula 3 L and call placed to respiratory therapist, Kathy, to update. Snack provided. Jese Trujillo RN

## 2019-08-03 NOTE — PROGRESS NOTES
Hospitalist Progress Note      PCP: Dulce Closs, MD    Date of Admission: 7/27/2019    Chief Complaint: shortness of breath    Subjective: Pt seen and examined. Breathing is better. 4L total out of R chest so far. Increase diuretics to bid. If need to increase pain meds we can    Medications:  Reviewed    Infusion Medications     Scheduled Medications    ipratropium-albuterol  1 ampule Inhalation Q4H WA    furosemide  40 mg Intravenous Daily    miconazole   Topical BID    [Held by provider] rivaroxaban  20 mg Oral Daily with breakfast    [Held by provider] aspirin  81 mg Oral Daily    metoprolol succinate  25 mg Oral Daily    sodium chloride flush  10 mL Intravenous 2 times per day     PRN Meds: acetaminophen, sodium chloride flush, magnesium hydroxide, ondansetron      Intake/Output Summary (Last 24 hours) at 8/3/2019 0743  Last data filed at 8/3/2019 0554  Gross per 24 hour   Intake 720 ml   Output 5365 ml   Net -4645 ml       Exam:    /66   Pulse 80   Temp 97.5 °F (36.4 °C) (Oral)   Resp 18   Ht 6' 1\" (1.854 m)   Wt 259 lb 7.7 oz (117.7 kg)   SpO2 95%   BMI 34.23 kg/m²     General appearance:appears stated age and cooperative. HEENT: Pupils equal, round, and reactive to light. Conjunctivae/corneas clear. Neck: Supple, with full range of motion. No jugular venous distention. Trachea midline. Respiratory:  Diminished bs bl, worse on R, better than yesterday  Cardiovascular: Regular rate and rhythm with normal S1/S2 without murmurs, rubs or gallops. Abdomen: Soft, non-tender, non-distended with normal bowel sounds. Musculoskeletal: No clubbing, cyanosis or edema bilaterally. Full range of motion without deformity. Skin: Skin color, texture, turgor normal.  No rashes or lesions.   Neurologic: Positive aphasia, improved from yesterday  Psychiatric: Alert and oriented  Capillary Refill: Brisk,< 3 seconds   Peripheral Pulses: +2 palpable, equal bilaterally       Labs:   No results MBS with SLP    Essential hypertension  -continue home meds    Obesity due to excess calories  -nutrition counseling provided     Poor social situation-PT OT consult.  consult to address needs at home. Patient has poor hygiene and I feel like he needs some further help at home      DVT Prophylaxis: hold Xarelto for possible thoracentesis  Diet: DIET LOW SODIUM 2 GM;  Dysphagia Minced and Moist; 1500 ml  Code Status: Full Code    PT/OT Eval Status: 5-7 sessions per week    Dispo - continue care, will discharge to [facility] when stable    Nilsa Keenan MD

## 2019-08-04 ENCOUNTER — APPOINTMENT (OUTPATIENT)
Dept: GENERAL RADIOLOGY | Age: 74
DRG: 432 | End: 2019-08-04
Payer: MEDICARE

## 2019-08-04 LAB
ALBUMIN SERPL-MCNC: 2.4 G/DL (ref 3.4–5)
ANION GAP SERPL CALCULATED.3IONS-SCNC: 8 MMOL/L (ref 3–16)
BASOPHILS ABSOLUTE: 0 K/UL (ref 0–0.2)
BASOPHILS RELATIVE PERCENT: 0.1 %
BUN BLDV-MCNC: 13 MG/DL (ref 7–20)
CALCIUM SERPL-MCNC: 8.2 MG/DL (ref 8.3–10.6)
CHLORIDE BLD-SCNC: 92 MMOL/L (ref 99–110)
CO2: 38 MMOL/L (ref 21–32)
CREAT SERPL-MCNC: 0.5 MG/DL (ref 0.8–1.3)
EOSINOPHILS ABSOLUTE: 0.4 K/UL (ref 0–0.6)
EOSINOPHILS RELATIVE PERCENT: 5 %
GFR AFRICAN AMERICAN: >60
GFR NON-AFRICAN AMERICAN: >60
GLUCOSE BLD-MCNC: 99 MG/DL (ref 70–99)
HCT VFR BLD CALC: 30.4 % (ref 40.5–52.5)
HEMOGLOBIN: 9.9 G/DL (ref 13.5–17.5)
LYMPHOCYTES ABSOLUTE: 0.5 K/UL (ref 1–5.1)
LYMPHOCYTES RELATIVE PERCENT: 5.7 %
MCH RBC QN AUTO: 34 PG (ref 26–34)
MCHC RBC AUTO-ENTMCNC: 32.6 G/DL (ref 31–36)
MCV RBC AUTO: 104.2 FL (ref 80–100)
MONOCYTES ABSOLUTE: 0.7 K/UL (ref 0–1.3)
MONOCYTES RELATIVE PERCENT: 7.8 %
NEUTROPHILS ABSOLUTE: 6.8 K/UL (ref 1.7–7.7)
NEUTROPHILS RELATIVE PERCENT: 81.4 %
PDW BLD-RTO: 14.4 % (ref 12.4–15.4)
PHOSPHORUS: 2.6 MG/DL (ref 2.5–4.9)
PLATELET # BLD: 157 K/UL (ref 135–450)
PMV BLD AUTO: 7.7 FL (ref 5–10.5)
POTASSIUM SERPL-SCNC: 4 MMOL/L (ref 3.5–5.1)
RBC # BLD: 2.92 M/UL (ref 4.2–5.9)
SODIUM BLD-SCNC: 138 MMOL/L (ref 136–145)
WBC # BLD: 8.4 K/UL (ref 4–11)

## 2019-08-04 PROCEDURE — 85025 COMPLETE CBC W/AUTO DIFF WBC: CPT

## 2019-08-04 PROCEDURE — 6370000000 HC RX 637 (ALT 250 FOR IP): Performed by: INTERNAL MEDICINE

## 2019-08-04 PROCEDURE — 94640 AIRWAY INHALATION TREATMENT: CPT

## 2019-08-04 PROCEDURE — 80069 RENAL FUNCTION PANEL: CPT

## 2019-08-04 PROCEDURE — 2700000000 HC OXYGEN THERAPY PER DAY

## 2019-08-04 PROCEDURE — 94660 CPAP INITIATION&MGMT: CPT

## 2019-08-04 PROCEDURE — 99233 SBSQ HOSP IP/OBS HIGH 50: CPT | Performed by: INTERNAL MEDICINE

## 2019-08-04 PROCEDURE — 2060000000 HC ICU INTERMEDIATE R&B

## 2019-08-04 PROCEDURE — 36415 COLL VENOUS BLD VENIPUNCTURE: CPT

## 2019-08-04 PROCEDURE — 71045 X-RAY EXAM CHEST 1 VIEW: CPT

## 2019-08-04 PROCEDURE — 2580000003 HC RX 258: Performed by: INTERNAL MEDICINE

## 2019-08-04 PROCEDURE — 6360000002 HC RX W HCPCS: Performed by: INTERNAL MEDICINE

## 2019-08-04 PROCEDURE — 94761 N-INVAS EAR/PLS OXIMETRY MLT: CPT

## 2019-08-04 RX ADMIN — IPRATROPIUM BROMIDE AND ALBUTEROL SULFATE 1 AMPULE: .5; 3 SOLUTION RESPIRATORY (INHALATION) at 08:32

## 2019-08-04 RX ADMIN — METOPROLOL SUCCINATE 25 MG: 25 TABLET, FILM COATED, EXTENDED RELEASE ORAL at 09:45

## 2019-08-04 RX ADMIN — HYDROCODONE BITARTRATE AND ACETAMINOPHEN 1 TABLET: 5; 325 TABLET ORAL at 03:55

## 2019-08-04 RX ADMIN — HYDROCODONE BITARTRATE AND ACETAMINOPHEN 1 TABLET: 5; 325 TABLET ORAL at 09:45

## 2019-08-04 RX ADMIN — FUROSEMIDE 40 MG: 10 INJECTION, SOLUTION INTRAMUSCULAR; INTRAVENOUS at 09:45

## 2019-08-04 RX ADMIN — IPRATROPIUM BROMIDE AND ALBUTEROL SULFATE 1 AMPULE: .5; 3 SOLUTION RESPIRATORY (INHALATION) at 19:28

## 2019-08-04 RX ADMIN — ASPIRIN 81 MG: 81 TABLET ORAL at 09:45

## 2019-08-04 RX ADMIN — MICONAZOLE NITRATE: 20 POWDER TOPICAL at 20:35

## 2019-08-04 RX ADMIN — MICONAZOLE NITRATE: 20 POWDER TOPICAL at 09:45

## 2019-08-04 RX ADMIN — SODIUM CHLORIDE, PRESERVATIVE FREE 10 ML: 5 INJECTION INTRAVENOUS at 20:35

## 2019-08-04 RX ADMIN — IPRATROPIUM BROMIDE AND ALBUTEROL SULFATE 1 AMPULE: .5; 3 SOLUTION RESPIRATORY (INHALATION) at 11:59

## 2019-08-04 RX ADMIN — RIVAROXABAN 20 MG: 20 TABLET, FILM COATED ORAL at 09:45

## 2019-08-04 RX ADMIN — IPRATROPIUM BROMIDE AND ALBUTEROL SULFATE 1 AMPULE: .5; 3 SOLUTION RESPIRATORY (INHALATION) at 15:42

## 2019-08-04 RX ADMIN — FUROSEMIDE 40 MG: 10 INJECTION, SOLUTION INTRAMUSCULAR; INTRAVENOUS at 20:35

## 2019-08-04 ASSESSMENT — PAIN DESCRIPTION - ONSET: ONSET: GRADUAL

## 2019-08-04 ASSESSMENT — PAIN DESCRIPTION - FREQUENCY: FREQUENCY: CONTINUOUS

## 2019-08-04 ASSESSMENT — PAIN DESCRIPTION - LOCATION
LOCATION: BACK
LOCATION: BACK

## 2019-08-04 ASSESSMENT — PAIN DESCRIPTION - ORIENTATION: ORIENTATION: RIGHT

## 2019-08-04 ASSESSMENT — PAIN SCALES - GENERAL
PAINLEVEL_OUTOF10: 0
PAINLEVEL_OUTOF10: 0
PAINLEVEL_OUTOF10: 6
PAINLEVEL_OUTOF10: 6
PAINLEVEL_OUTOF10: 0

## 2019-08-04 ASSESSMENT — PAIN DESCRIPTION - PAIN TYPE
TYPE: ACUTE PAIN
TYPE: ACUTE PAIN

## 2019-08-04 ASSESSMENT — PAIN DESCRIPTION - DESCRIPTORS: DESCRIPTORS: BURNING;CONSTANT

## 2019-08-04 ASSESSMENT — PAIN DESCRIPTION - PROGRESSION: CLINICAL_PROGRESSION: GRADUALLY WORSENING

## 2019-08-04 ASSESSMENT — PAIN - FUNCTIONAL ASSESSMENT: PAIN_FUNCTIONAL_ASSESSMENT: ACTIVITIES ARE NOT PREVENTED

## 2019-08-05 ENCOUNTER — APPOINTMENT (OUTPATIENT)
Dept: GENERAL RADIOLOGY | Age: 74
DRG: 432 | End: 2019-08-05
Payer: MEDICARE

## 2019-08-05 LAB
ALBUMIN SERPL-MCNC: 2.7 G/DL (ref 3.4–5)
ANION GAP SERPL CALCULATED.3IONS-SCNC: 9 MMOL/L (ref 3–16)
BUN BLDV-MCNC: 11 MG/DL (ref 7–20)
CALCIUM SERPL-MCNC: 8.4 MG/DL (ref 8.3–10.6)
CHLORIDE BLD-SCNC: 86 MMOL/L (ref 99–110)
CO2: 37 MMOL/L (ref 21–32)
CREAT SERPL-MCNC: <0.5 MG/DL (ref 0.8–1.3)
GFR AFRICAN AMERICAN: >60
GFR NON-AFRICAN AMERICAN: >60
GLUCOSE BLD-MCNC: 104 MG/DL (ref 70–99)
PHOSPHORUS: 2.3 MG/DL (ref 2.5–4.9)
POTASSIUM SERPL-SCNC: 3.8 MMOL/L (ref 3.5–5.1)
SODIUM BLD-SCNC: 132 MMOL/L (ref 136–145)

## 2019-08-05 PROCEDURE — 2700000000 HC OXYGEN THERAPY PER DAY

## 2019-08-05 PROCEDURE — 94660 CPAP INITIATION&MGMT: CPT

## 2019-08-05 PROCEDURE — 2060000000 HC ICU INTERMEDIATE R&B

## 2019-08-05 PROCEDURE — 97530 THERAPEUTIC ACTIVITIES: CPT

## 2019-08-05 PROCEDURE — 6370000000 HC RX 637 (ALT 250 FOR IP): Performed by: INTERNAL MEDICINE

## 2019-08-05 PROCEDURE — 92526 ORAL FUNCTION THERAPY: CPT

## 2019-08-05 PROCEDURE — 94761 N-INVAS EAR/PLS OXIMETRY MLT: CPT

## 2019-08-05 PROCEDURE — 36415 COLL VENOUS BLD VENIPUNCTURE: CPT

## 2019-08-05 PROCEDURE — 99233 SBSQ HOSP IP/OBS HIGH 50: CPT | Performed by: INTERNAL MEDICINE

## 2019-08-05 PROCEDURE — 94640 AIRWAY INHALATION TREATMENT: CPT

## 2019-08-05 PROCEDURE — 71045 X-RAY EXAM CHEST 1 VIEW: CPT

## 2019-08-05 PROCEDURE — 97127 HC SP THER IVNTJ W/FOCUS COG FUNCJ: CPT

## 2019-08-05 PROCEDURE — 6360000002 HC RX W HCPCS: Performed by: INTERNAL MEDICINE

## 2019-08-05 PROCEDURE — 2580000003 HC RX 258: Performed by: INTERNAL MEDICINE

## 2019-08-05 PROCEDURE — 97110 THERAPEUTIC EXERCISES: CPT

## 2019-08-05 PROCEDURE — 80069 RENAL FUNCTION PANEL: CPT

## 2019-08-05 RX ADMIN — SODIUM CHLORIDE, PRESERVATIVE FREE 10 ML: 5 INJECTION INTRAVENOUS at 21:30

## 2019-08-05 RX ADMIN — FUROSEMIDE 40 MG: 10 INJECTION, SOLUTION INTRAMUSCULAR; INTRAVENOUS at 17:54

## 2019-08-05 RX ADMIN — RIVAROXABAN 20 MG: 20 TABLET, FILM COATED ORAL at 08:34

## 2019-08-05 RX ADMIN — SODIUM CHLORIDE, PRESERVATIVE FREE 10 ML: 5 INJECTION INTRAVENOUS at 08:36

## 2019-08-05 RX ADMIN — IPRATROPIUM BROMIDE AND ALBUTEROL SULFATE 1 AMPULE: .5; 3 SOLUTION RESPIRATORY (INHALATION) at 11:48

## 2019-08-05 RX ADMIN — METOPROLOL SUCCINATE 25 MG: 25 TABLET, FILM COATED, EXTENDED RELEASE ORAL at 08:34

## 2019-08-05 RX ADMIN — IPRATROPIUM BROMIDE AND ALBUTEROL SULFATE 1 AMPULE: .5; 3 SOLUTION RESPIRATORY (INHALATION) at 16:39

## 2019-08-05 RX ADMIN — MICONAZOLE NITRATE: 20 POWDER TOPICAL at 08:35

## 2019-08-05 RX ADMIN — IPRATROPIUM BROMIDE AND ALBUTEROL SULFATE 1 AMPULE: .5; 3 SOLUTION RESPIRATORY (INHALATION) at 07:53

## 2019-08-05 RX ADMIN — HYDROCODONE BITARTRATE AND ACETAMINOPHEN 1 TABLET: 5; 325 TABLET ORAL at 16:23

## 2019-08-05 RX ADMIN — HYDROCODONE BITARTRATE AND ACETAMINOPHEN 1 TABLET: 5; 325 TABLET ORAL at 08:34

## 2019-08-05 RX ADMIN — MICONAZOLE NITRATE: 20 POWDER TOPICAL at 21:30

## 2019-08-05 RX ADMIN — FUROSEMIDE 40 MG: 10 INJECTION, SOLUTION INTRAMUSCULAR; INTRAVENOUS at 08:34

## 2019-08-05 RX ADMIN — IPRATROPIUM BROMIDE AND ALBUTEROL SULFATE 1 AMPULE: .5; 3 SOLUTION RESPIRATORY (INHALATION) at 20:12

## 2019-08-05 RX ADMIN — ASPIRIN 81 MG: 81 TABLET ORAL at 08:34

## 2019-08-05 ASSESSMENT — PAIN DESCRIPTION - LOCATION
LOCATION: RIB CAGE

## 2019-08-05 ASSESSMENT — PAIN DESCRIPTION - PAIN TYPE
TYPE: ACUTE PAIN

## 2019-08-05 ASSESSMENT — PAIN SCALES - GENERAL
PAINLEVEL_OUTOF10: 0
PAINLEVEL_OUTOF10: 0
PAINLEVEL_OUTOF10: 4
PAINLEVEL_OUTOF10: 0
PAINLEVEL_OUTOF10: 5
PAINLEVEL_OUTOF10: 4

## 2019-08-05 ASSESSMENT — PAIN DESCRIPTION - FREQUENCY
FREQUENCY: INTERMITTENT
FREQUENCY: INTERMITTENT

## 2019-08-05 ASSESSMENT — PAIN DESCRIPTION - ORIENTATION
ORIENTATION: RIGHT

## 2019-08-05 ASSESSMENT — PAIN DESCRIPTION - DESCRIPTORS
DESCRIPTORS: SHARP
DESCRIPTORS: SHARP;SORE
DESCRIPTORS: SHARP

## 2019-08-05 ASSESSMENT — PAIN - FUNCTIONAL ASSESSMENT
PAIN_FUNCTIONAL_ASSESSMENT: ACTIVITIES ARE NOT PREVENTED
PAIN_FUNCTIONAL_ASSESSMENT: PREVENTS OR INTERFERES SOME ACTIVE ACTIVITIES AND ADLS
PAIN_FUNCTIONAL_ASSESSMENT: ACTIVITIES ARE NOT PREVENTED

## 2019-08-05 NOTE — CONSULTS
08/04/19 1623 107/69 97.6 °F (36.4 °C) -- 106 18 90 % --     Const: Alert. WDWN. No distress  Eyes: Conjunctiva noninjected, no icterus noted; pupils equal, round, and reactive to light. HENT: Atraumatic, normocephalic; Oral mucosa moist  Neck: Trachea midline, neck supple. No thyromegaly noted. CV: Regular rate and rhythm, no murmur rub or gallop noted  Resp: Lungs with scattered rhonchi (? transmitted upper airway sounds), decreased at bases. R side chest tube in place, serosanguinous drainage. Respirations unlabored. GI: Soft, nontender, nondistended. Normal bowel sounds. No palpable masses. Skin: Venous stasis changes BLEs. Normal temperature and turgor. No rashes or breakdown noted. Ext: + edema. No varicosities. MSK: No joint tenderness, erythema, warmth noted. AROM intact. Neuro: Alert, oriented to person, place, and grossly to situation, but not to date. Some evidence of impaired recall and mild confusion in conversation. Follows all verbal commands. Speech is dysarthric and at times effortful. No cranial nerve deficits appreciated except mild right facial droop. Sensation intact to light touch. Motor examination reveals overall 4/5 strength LUE/LLE except 3/5 left hip flexion; and 4-/5 strength RUE and RLE except 2-3/5 hip flexion. Reflexes difficult to elicit as patient having hard time relaxing. Seems to have increased tone on the right with intermittent spasms.    Psych: Stable mood, normal judgement, normal affect     Lab Results   Component Value Date    WBC 8.4 08/04/2019    HGB 9.9 (L) 08/04/2019    HCT 30.4 (L) 08/04/2019    .2 (H) 08/04/2019     08/04/2019     Lab Results   Component Value Date    INR 1.31 (H) 08/02/2019    INR 1.39 (H) 01/05/2017    INR 1.37 (H) 01/04/2017    PROTIME 14.9 (H) 08/02/2019    PROTIME 16.0 (H) 01/05/2017    PROTIME 15.7 (H) 01/04/2017     Lab Results   Component Value Date    CREATININE <0.5 (L) 08/05/2019    BUN 11 08/05/2019     (L) Lymphoma  Cirrhosis  CHF  Afib  HTN  Obesity  Dysphagia  Prior stroke with right hemiparesis and aphasia - deficits worsened this admission, unable to tolerate MRI to evaluate for acute stroke    Impairments: endurance, generalized weakness (in addition to baseline right hemiparesis), respiratory insufficiency, cognition, aphasia, dysphagia    Recommendations:  Patient currently motivated to do rehab but I am concerned that he does not have ability to tolerate 3 hours therapy/day in ARU setting. Slower pace in SNF setting may be more appropriate. If activity tolerance improves, we could certainly re-evaluate. Will follow. Note his preference is for [facility]. CM aware. Continue PT/OT/SLP while inpatient. Thank you for this consult. Please contact me with any questions or concerns. Macr Faria.  Lyndsey Alexandre MD 8/5/2019, 1:27 PM

## 2019-08-05 NOTE — PLAN OF CARE
Problem: Falls - Risk of:  Goal: Will remain free from falls  Description  Will remain free from falls  Outcome: Ongoing  Goal: Absence of physical injury  Description  Absence of physical injury  Outcome: Ongoing     Problem: Risk for Impaired Skin Integrity  Goal: Tissue integrity - skin and mucous membranes  Description  Structural intactness and normal physiological function of skin and  mucous membranes. Outcome: Ongoing     Problem: OXYGENATION/RESPIRATORY FUNCTION  Goal: Patient will maintain patent airway  Outcome: Ongoing  Goal: Patient will achieve/maintain normal respiratory rate/effort  Description  Respiratory rate and effort will be within normal limits for the patient  Outcome: Ongoing     Problem: HEMODYNAMIC STATUS  Goal: Patient has stable vital signs and fluid balance  Outcome: Ongoing     Problem: FLUID AND ELECTROLYTE IMBALANCE  Goal: Fluid and electrolyte balance are achieved/maintained  Outcome: Ongoing     Problem: ACTIVITY INTOLERANCE/IMPAIRED MOBILITY  Goal: Mobility/activity is maintained at optimum level for patient  Outcome: Ongoing     Problem: Pain:  Description  Pain management should include both nonpharmacologic and pharmacologic interventions.   Goal: Pain level will decrease  Description  Pain level will decrease  Outcome: Ongoing  Goal: Control of acute pain  Description  Control of acute pain  Outcome: Ongoing  Goal: Control of chronic pain  Description  Control of chronic pain  Outcome: Ongoing     Problem: Nutrition  Goal: Optimal nutrition therapy  Outcome: Ongoing

## 2019-08-05 NOTE — PROGRESS NOTES
Pulmonary Progress Note    CC: Cough, chest tube placement, right effusion    Subjective:    Past 24 hours 260 ml of output from chest tube with more today  Pain is beter  Breathing is better      ROS  Less pain  Less SOB      Intake/Output Summary (Last 24 hours) at 8/5/2019 0748  Last data filed at 8/5/2019 0600  Gross per 24 hour   Intake 840 ml   Output 3560 ml   Net -2720 ml         PHYSICAL EXAM:  Blood pressure 116/70, pulse 96, temperature 98.1 °F (36.7 °C), temperature source Oral, resp. rate 20, height 6' 1\" (1.854 m), weight 257 lb 11.5 oz (116.9 kg), SpO2 93 %.'  Gen: No distress. Chronically ill, more awake  Eyes: PERRL. No sclera icterus. No conjunctival injection. ENT: No discharge. Pharynx clear. Raspy voice  Neck: Trachea midline. No obvious mass. Resp: still with upper airway rhonchi, chest tube in place without air leak  CV: Regular rate. Regular rhythm. No murmur or rub. GI: Non-tender. Non-distended. No hernia. Chou cath in place with good UOP  Skin: Warm, dry, normal texture and turgor. PVD in BLE. Lymph: No cervical LAD. No supraclavicular LAD. M/S: No cyanosis. No clubbing. No joint deformity. Neuro: Chronic neuro deficits from previous stoke. Dysarthric with aphasia. Moves all extremities.    Ext:   No edema    Medications:    Scheduled Meds:   furosemide  40 mg Intravenous BID    ipratropium-albuterol  1 ampule Inhalation Q4H WA    miconazole   Topical BID    rivaroxaban  20 mg Oral Daily with breakfast    aspirin  81 mg Oral Daily    metoprolol succinate  25 mg Oral Daily    sodium chloride flush  10 mL Intravenous 2 times per day       Continuous Infusions:      PRN Meds:  HYDROcodone 5 mg - acetaminophen, acetaminophen, sodium chloride flush, magnesium hydroxide, ondansetron    Labs:  CBC:   Recent Labs     08/04/19  0659   WBC 8.4   HGB 9.9*   HCT 30.4*   .2*        BMP:   Recent Labs     08/03/19  0624 08/04/19  0659    138   K 4.3 4.0   CL 97*

## 2019-08-05 NOTE — PROGRESS NOTES
Infestation by bed bug, and Obesity. has a past surgical history that includes joint replacement (Right, 2010); knee surgery; fine needle aspiration; and Tunneled venous port placement (Left, 09/02/2016). Restrictions  Restrictions/Precautions: Fall Risk  Other position/activity restrictions: 2 L O2, rossi, chest tube    Subjective  Chart Reviewed: Yes  Additional Pertinent Hx: Per Manuel Arciniega MD's consult note 7/28: Jimmy Edward is a 69y.o. year old male with a history of a aphasia status post stroke, CHF, DLCBL who presents with frequent falls, worsening dysphagia, and shortness of breath. Patient states he has been falling a couple times a week chronically, but has increased in frequency more recently. Is on Xarelto for A. fib. This is been held for planned thoracentesis today. Patient is e currently is on 2 L of oxygen he does not normally have a home oxygen requirement. On arrival found to be hypoxic and CT scan of the chest showed a large right-sided pleural effusion. BNP also elevated to 2000. \"  Pt s/p thoracentesis 7/28.    Response to previous treatment: Patient unable to report, no changes reported from family or staff  Family / Caregiver Present: No  Referring Practitioner: Emanuel Puentes MD  Diagnosis: R pleural effusion, Acute Hypoxemic Respiratory Failure , CT placement 8/2  Subjective: Patient met b/s as co-treat with PT, does not resist therapy, yet limited command following throughout session, does not report or indicate any pain     Objective  ADL  Toileting: Dependent/Total(catheter urine )    Balance  Sitting Balance: (variable at EOB CGA-Mod A sititng EOB )  Standing Balance: Dependent/Total(Max A x2 in robson stedy, leans to left )    Transfers  Sit to stand: 2 Person assistance;Maximum assistance  Stand to sit: 2 Person assistance;Maximum assistance  Transfer Comments: Max A x2 from elevated EOB and from robson stedy seat     Functional Mobility  Assist Level: Dependent/Total  Functional Mobility Comments: robson johnson bed-recliner     Bed mobility  Supine to Sit: 2 Person assistance;Maximum assistance  Scooting: Dependent/Total    Cognition  Overall Cognitive Status: Exceptions  Arousal/Alertness: Delayed responses to stimuli  Following Commands: Inconsistently follows commands  Attention Span: Difficulty attending to directions  Insights: Decreased awareness of deficits  Initiation: Requires cues for some  Sequencing: Requires cues for some  Cognition Comment: chronic aphasia     Exercises  Other: attempted UE exercises, yet does not follow commands to participate     Plan  Times per week: 3-5  Times per day: Daily  Current Treatment Recommendations: Strengthening, Balance Training, Functional Mobility Training, Endurance Training, Cognitive Reorientation, Cognitive/Perceptual Training, Self-Care / ADL, Safety Education & Training, ROM, Wheelchair Mobility Training    Goals  Short term goals  Time Frame for Short term goals: Prior to d/c: 8/5 pt progressing towards goals  Short term goal 1: Pt will complete UB bathing/dressing with min A. Short term goal 2: Pt will complete LB bathing/dressing with max A. Short term goal 3: Pt will complete toileting with max A. Short term goal 4: Pt will complete grooming with min A. Short term goal 5: Pt will complete feeding with set-up/mod I. Short term goal 6: Pt will complete fxl transfer to/from ADL surfaces with mod A x1-2 using AD/lift equipment prn. Short term goal 7: Pt will tolerate 10-15 minutes of B UE therex to improve strength/endurance for ADLs. Long term goals  Time Frame for Long term goals : STG=LTG  Patient Goals   Patient goals : \"I want to get out of here. \"       Therapy Time   Individual Concurrent Group Co-treatment   Time In       0920   Time Out       0945   Minutes       25   Timed Code Treatment Minutes: [address] [place] [id number]

## 2019-08-05 NOTE — PROGRESS NOTES
Physical Therapy  Facility/Department: [address] PROGRESSIVE CARE  Daily Treatment Note - COTX  This note serves as D/C summary if patient is discharged prior to next treatment session. NAME: Ti Mederos  : 1945  MRN: [id number]    Date of Service: 2019    Discharge Recommendations:  Patient would benefit from continued therapy after discharge, 3-5 sessions per week   PT Equipment Recommendations  Other: will monitor    Assessment   Body structures, Functions, Activity limitations: Decreased functional mobility ; Decreased strength;Decreased balance;Decreased endurance  Assessment: Pt able to tolerate several standing trials at [facility]. He continues to require Max A x 2 to achieve upright stance, and remains unsafe to return home. Continue to recommend low-moderate frequency therapy upon D/C. Ti Mederos scored a  on the AM-PAC short mobility form. Current research shows that an AM-PAC score of 17 or less is typically not associated with a discharge to the patient's home setting. Based on the patients AM-PAC score and their current functional mobility deficits, it is recommended that the patient have 3-5 sessions per week of Physical Therapy at d/c to increase the patients independence. Treatment Diagnosis: weakness, impaired mobility  Specific instructions for Next Treatment: cotx  Prognosis: Fair;Guarded  Decision Making: Medium Complexity  History: see above  Exam: see above  Clinical Presentation: evolving  PT Education: Goals;Transfer Training;Home Exercise Program;Gait Training;Functional Mobility Training;Equipment; Family Education  Patient Education: role of acute care PT; technique to correct sitting balance  Barriers to Learning: cog  REQUIRES PT FOLLOW UP: Yes  Activity Tolerance  Activity Tolerance: Patient limited by fatigue;Patient limited by endurance     Patient Diagnosis(es): The primary encounter diagnosis was Congestive heart failure, unspecified HF chronicity, unspecified heart failure type ([facility] [address] A diagnosis of Pleural effusion was also pertinent to this visit. has a past medical history of Atrial fibrillation ([facility] [address] B-cell lymphoma ([facility] [address] Back pain, Cancer ([facility] [address] Cerebral infarction ([facility] [address] CHF (congestive heart failure) ([facility] [address] Hypertension, Hypokalemia, Infestation by bed bug, and Obesity. has a past surgical history that includes joint replacement (Right, 2010); knee surgery; fine needle aspiration; and Tunneled venous port placement (Left, 09/02/2016). Restrictions  Restrictions/Precautions  Restrictions/Precautions: Fall Risk  Position Activity Restriction  Other position/activity restrictions: 2 L O2, rossi, chest tube     Subjective   General  Chart Reviewed: Yes  Additional Pertinent Hx: Per pulmonology note of Bia Farmer MD on 7-28-19: The pt came to the ED with frequent falls, worsening dysphagia and SOB. In the ED: he was hypoxic, CT scan of the chest showed large R pleural effusion and BNP elevated. The pt had a thoracocenteisis 7-28-19. PMHx: a-fib, B-cell lymphoma, back pain, Ca, CVA, CHF, HTN, hypokalemia, R TKR  Response To Previous Treatment: Not applicable  Referring Practitioner: Tammy Lim MD  Subjective  Subjective: Pt agreeable to OOB activity. Confused about his catheter (does not realize that he does not need to urinate in commode).   Pain Screening  Patient Currently in Pain: No  Vital Signs  Patient Currently in Pain: No       Orientation  Orientation  Overall Orientation Status: Impaired  Orientation Level: Oriented to person;Disoriented to situation    Objective      Bed mobility  Supine to Sit: Maximum assistance;2 Person assistance(Limited d/t fatigue and SOB)     Transfers  Sit to Stand: 2 Person Assistance;Maximum Assistance  Stand to sit: Maximum Assistance;2 Person Assistance  Bed to Chair: Dependent/Total;2 Person Assistance(X2 using robson johnson)     Ambulation  Ambulation?: No     Exercises  Comments:

## 2019-08-06 ENCOUNTER — APPOINTMENT (OUTPATIENT)
Dept: GENERAL RADIOLOGY | Age: 74
DRG: 432 | End: 2019-08-06
Payer: MEDICARE

## 2019-08-06 VITALS
HEART RATE: 91 BPM | HEIGHT: 73 IN | RESPIRATION RATE: 16 BRPM | DIASTOLIC BLOOD PRESSURE: 78 MMHG | WEIGHT: 255.95 LBS | OXYGEN SATURATION: 93 % | BODY MASS INDEX: 33.92 KG/M2 | TEMPERATURE: 97.8 F | SYSTOLIC BLOOD PRESSURE: 134 MMHG

## 2019-08-06 LAB
ALBUMIN SERPL-MCNC: 2.6 G/DL (ref 3.4–5)
ANION GAP SERPL CALCULATED.3IONS-SCNC: 6 MMOL/L (ref 3–16)
BASOPHILS ABSOLUTE: 0 K/UL (ref 0–0.2)
BASOPHILS RELATIVE PERCENT: 0.3 %
BUN BLDV-MCNC: 13 MG/DL (ref 7–20)
CALCIUM SERPL-MCNC: 8.5 MG/DL (ref 8.3–10.6)
CHLORIDE BLD-SCNC: 87 MMOL/L (ref 99–110)
CO2: 38 MMOL/L (ref 21–32)
CREAT SERPL-MCNC: 0.7 MG/DL (ref 0.8–1.3)
EOSINOPHILS ABSOLUTE: 0.4 K/UL (ref 0–0.6)
EOSINOPHILS RELATIVE PERCENT: 4 %
GFR AFRICAN AMERICAN: >60
GFR NON-AFRICAN AMERICAN: >60
GLUCOSE BLD-MCNC: 106 MG/DL (ref 70–99)
HCT VFR BLD CALC: 31.3 % (ref 40.5–52.5)
HEMOGLOBIN: 10.2 G/DL (ref 13.5–17.5)
LYMPHOCYTES ABSOLUTE: 0.6 K/UL (ref 1–5.1)
LYMPHOCYTES RELATIVE PERCENT: 6.7 %
MAGNESIUM: 2.1 MG/DL (ref 1.8–2.4)
MCH RBC QN AUTO: 33.7 PG (ref 26–34)
MCHC RBC AUTO-ENTMCNC: 32.7 G/DL (ref 31–36)
MCV RBC AUTO: 103 FL (ref 80–100)
MONOCYTES ABSOLUTE: 0.8 K/UL (ref 0–1.3)
MONOCYTES RELATIVE PERCENT: 9.6 %
NEUTROPHILS ABSOLUTE: 7 K/UL (ref 1.7–7.7)
NEUTROPHILS RELATIVE PERCENT: 79.4 %
PDW BLD-RTO: 14.5 % (ref 12.4–15.4)
PHOSPHORUS: 3 MG/DL (ref 2.5–4.9)
PLATELET # BLD: 187 K/UL (ref 135–450)
PMV BLD AUTO: 7.7 FL (ref 5–10.5)
POTASSIUM REFLEX MAGNESIUM: 3.5 MMOL/L (ref 3.5–5.1)
POTASSIUM SERPL-SCNC: 3.5 MMOL/L (ref 3.5–5.1)
RBC # BLD: 3.04 M/UL (ref 4.2–5.9)
SODIUM BLD-SCNC: 131 MMOL/L (ref 136–145)
WBC # BLD: 8.8 K/UL (ref 4–11)

## 2019-08-06 PROCEDURE — 99232 SBSQ HOSP IP/OBS MODERATE 35: CPT | Performed by: INTERNAL MEDICINE

## 2019-08-06 PROCEDURE — 97530 THERAPEUTIC ACTIVITIES: CPT

## 2019-08-06 PROCEDURE — 85025 COMPLETE CBC W/AUTO DIFF WBC: CPT

## 2019-08-06 PROCEDURE — 6370000000 HC RX 637 (ALT 250 FOR IP): Performed by: NURSE PRACTITIONER

## 2019-08-06 PROCEDURE — 80069 RENAL FUNCTION PANEL: CPT

## 2019-08-06 PROCEDURE — 94640 AIRWAY INHALATION TREATMENT: CPT

## 2019-08-06 PROCEDURE — 6360000002 HC RX W HCPCS: Performed by: INTERNAL MEDICINE

## 2019-08-06 PROCEDURE — 6370000000 HC RX 637 (ALT 250 FOR IP): Performed by: INTERNAL MEDICINE

## 2019-08-06 PROCEDURE — 94761 N-INVAS EAR/PLS OXIMETRY MLT: CPT

## 2019-08-06 PROCEDURE — 2580000003 HC RX 258: Performed by: INTERNAL MEDICINE

## 2019-08-06 PROCEDURE — 83735 ASSAY OF MAGNESIUM: CPT

## 2019-08-06 PROCEDURE — 71045 X-RAY EXAM CHEST 1 VIEW: CPT

## 2019-08-06 PROCEDURE — 97535 SELF CARE MNGMENT TRAINING: CPT

## 2019-08-06 PROCEDURE — 6370000000 HC RX 637 (ALT 250 FOR IP): Performed by: HOSPITALIST

## 2019-08-06 PROCEDURE — 36415 COLL VENOUS BLD VENIPUNCTURE: CPT

## 2019-08-06 PROCEDURE — 2700000000 HC OXYGEN THERAPY PER DAY

## 2019-08-06 RX ORDER — HYDROCODONE BITARTRATE AND ACETAMINOPHEN 5; 325 MG/1; MG/1
1 TABLET ORAL EVERY 8 HOURS PRN
Qty: 9 TABLET | Refills: 0 | Status: SHIPPED | OUTPATIENT
Start: 2019-08-06 | End: 2019-08-09

## 2019-08-06 RX ORDER — POTASSIUM CHLORIDE 20 MEQ/1
TABLET, EXTENDED RELEASE ORAL
Qty: 180 TABLET | Refills: 2 | Status: ON HOLD
Start: 2019-08-06 | End: 2019-12-09 | Stop reason: HOSPADM

## 2019-08-06 RX ADMIN — HYDROCODONE BITARTRATE AND ACETAMINOPHEN 1 TABLET: 5; 325 TABLET ORAL at 09:59

## 2019-08-06 RX ADMIN — FUROSEMIDE 40 MG: 10 INJECTION, SOLUTION INTRAMUSCULAR; INTRAVENOUS at 08:58

## 2019-08-06 RX ADMIN — ACETAMINOPHEN 650 MG: 325 TABLET ORAL at 12:21

## 2019-08-06 RX ADMIN — HYDROCODONE BITARTRATE AND ACETAMINOPHEN 1 TABLET: 5; 325 TABLET ORAL at 01:06

## 2019-08-06 RX ADMIN — IPRATROPIUM BROMIDE AND ALBUTEROL SULFATE 1 AMPULE: .5; 3 SOLUTION RESPIRATORY (INHALATION) at 11:58

## 2019-08-06 RX ADMIN — ANORECTAL OINTMENT: 15.7; .44; 24; 20.6 OINTMENT TOPICAL at 13:57

## 2019-08-06 RX ADMIN — METOPROLOL SUCCINATE 25 MG: 25 TABLET, FILM COATED, EXTENDED RELEASE ORAL at 08:58

## 2019-08-06 RX ADMIN — ASPIRIN 81 MG: 81 TABLET ORAL at 08:58

## 2019-08-06 RX ADMIN — SODIUM CHLORIDE, PRESERVATIVE FREE 10 ML: 5 INJECTION INTRAVENOUS at 08:58

## 2019-08-06 RX ADMIN — IPRATROPIUM BROMIDE AND ALBUTEROL SULFATE 1 AMPULE: .5; 3 SOLUTION RESPIRATORY (INHALATION) at 07:49

## 2019-08-06 RX ADMIN — RIVAROXABAN 20 MG: 20 TABLET, FILM COATED ORAL at 08:04

## 2019-08-06 RX ADMIN — MICONAZOLE NITRATE: 20 POWDER TOPICAL at 08:58

## 2019-08-06 ASSESSMENT — PAIN SCALES - GENERAL
PAINLEVEL_OUTOF10: 0
PAINLEVEL_OUTOF10: 4
PAINLEVEL_OUTOF10: 6
PAINLEVEL_OUTOF10: 4
PAINLEVEL_OUTOF10: 0
PAINLEVEL_OUTOF10: 1

## 2019-08-06 ASSESSMENT — PAIN DESCRIPTION - ORIENTATION
ORIENTATION: RIGHT

## 2019-08-06 ASSESSMENT — PAIN - FUNCTIONAL ASSESSMENT
PAIN_FUNCTIONAL_ASSESSMENT: PREVENTS OR INTERFERES SOME ACTIVE ACTIVITIES AND ADLS
PAIN_FUNCTIONAL_ASSESSMENT: ACTIVITIES ARE NOT PREVENTED
PAIN_FUNCTIONAL_ASSESSMENT: ACTIVITIES ARE NOT PREVENTED

## 2019-08-06 ASSESSMENT — PAIN DESCRIPTION - DESCRIPTORS
DESCRIPTORS: ACHING

## 2019-08-06 ASSESSMENT — PAIN DESCRIPTION - PROGRESSION
CLINICAL_PROGRESSION: GRADUALLY WORSENING
CLINICAL_PROGRESSION: NOT CHANGED
CLINICAL_PROGRESSION: NOT CHANGED

## 2019-08-06 ASSESSMENT — PAIN DESCRIPTION - LOCATION
LOCATION: SHOULDER
LOCATION: SHOULDER
LOCATION: RIB CAGE

## 2019-08-06 ASSESSMENT — PAIN DESCRIPTION - PAIN TYPE
TYPE: ACUTE PAIN

## 2019-08-06 ASSESSMENT — PAIN DESCRIPTION - FREQUENCY
FREQUENCY: INTERMITTENT

## 2019-08-06 ASSESSMENT — PAIN DESCRIPTION - ONSET
ONSET: ON-GOING

## 2019-08-06 NOTE — DISCHARGE SUMMARY
Hospitalist Discharge Summary    Patient ID:  Rubén Ricci  [id number]  53 y.o.  1945    Admit date: 7/27/2019    Discharge date: 8/6/2019    Disposition: Acute rehab     Admission Diagnoses:   Patient Active Problem List   Diagnosis    Anasarca    Cerebral infarction ([facility] [address]    Hypokalemia    Morbid obesity ([facility] [address]    CHF (congestive heart failure) (HCC)    Abnormal stress test    Chronic atrial fibrillation (HCC)    Chronic diastolic congestive heart failure (HCC)    Bradycardia    Diffuse large B-cell lymphoma ([facility] [address]    Debility    Cerebrovascular accident (CVA) ([facility] [address]    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 *      Code Status:  Full Code    Condition:  Stable    Discharge Diet: Diet:  DIET LOW SODIUM 2 GM; Dysphagia Minced and Moist; 1800 ml    PCP to do list:      Follow with Dr. Bere Abarca from [facility] for repeat x-ray in 1 to 2 weeks      Hospital Course:     Patient presented to the hospital with some shortness of breath and cough, he was found to have right-sided pleural effusion. He was seen by Pulm  IR did a thoracentesis in place chest tube. His symptoms improved and he is being discharged to follow-up with his primary care as an outpatient. Patient also will follow with Dr. Bere Abarca from [facility]  as an outpatient for repeat x-ray in 1 to 2 weeks.     Acute Resp Failure with Hypoxia and hypercapnia  Secondary to pleural effusion  Improving  Continue with oxygen to keep saturation above 90%     Right pleural effusion-fairly large.    Diagnostic thoracentesis showed exudative effusion  First and second cytology were negative  Unclear etiology? Could be related to some underlying cirrhosis? Interventional radiology placed chest tube 8/2- removed 8/6  Follow-up with pulm as an outpatient for repeat x-ray  Continue with Lasix and Aldactone          A. Fib-rate controlled.    Continue beta-blocker, continue with Xarelto     Hx diffuse large B Cell lymphoma  Seen by heme-onc. Pleural effusion cytology was negative.       Chronic CHF  He looks compensated  Continue with Lasix, and Aldactone  Low-salt diet and fluid restriction     Hx CVA with residual right sided deficits, aphasia  Continue to monitor  PT/OT ordered  Worsened from baseline  MRI ordered but unable to lie flat  MBS with SLP  May benefit from acute rehab for PT/OT/speech therapy     Essential hypertension  Continue home meds     Obesity due to excess calories  Body mass index is 34 kg/m². Weight loss counseling was provided    Discharge Medications:   Current Discharge Medication List      START taking these medications    Details   HYDROcodone-acetaminophen (NORCO) 5-325 MG per tablet Take 1 tablet by mouth every 8 hours as needed for Pain for up to 3 days.   Qty: 9 tablet, Refills: 0    Comments: Reduce doses taken as pain becomes manageable  Associated Diagnoses: Chest wall pain           Current Discharge Medication List      CONTINUE these medications which have CHANGED    Details   potassium chloride (KLOR-CON M) 20 MEQ extended release tablet TAKE 1 TABLET BY MOUTH  DAILY  Qty: 180 tablet, Refills: 2           Current Discharge Medication List      CONTINUE these medications which have NOT CHANGED    Details   atorvastatin (LIPITOR) 20 MG tablet TAKE 1 TABLET BY MOUTH DAILY  Qty: 90 tablet, Refills: 3      Emollient (DERMAPHOR) OINT ointment APPLY TOPICALLY TWICE DAILY AS NEEDED  Qty: 453.9 g, Refills: 0      rivaroxaban (XARELTO) 20 MG TABS tablet TAKE 1 TABLET BY MOUTH DAILY  Qty: 30 tablet, Refills: 11      torsemide (DEMADEX) 20 MG tablet TAKE 2 TABLETS BY MOUTH TWICE DAILY  Qty: 360 tablet, Refills: 3    Comments: **Patient requests 90 days supply**      clotrimazole (LOTRIMIN) 1 % cream APPLY EXTERNALLY TO THE AFFECTED AREA TWICE DAILY  Qty: 15 g, Refills: 0    Associated Diagnoses: Tinea cruris      ASPIRIN LOW DOSE 81 MG EC tablet TAKE 1 TABLET BY MOUTH DAILY  Qty: 90 tablet, Refills: 2    Associated Diagnoses: Coronary artery disease involving native coronary artery of native heart without angina pectoris      metoprolol succinate (TOPROL XL) 25 MG extended release tablet TAKE 1 TABLET BY MOUTH DAILY  Qty: 90 tablet, Refills: 3      ferrous sulfate (BEST-GLORIA) 325 (65 Fe) MG tablet Take 1 tablet by mouth 2 times daily  Qty: 60 tablet, Refills: 11    Associated Diagnoses: Iron deficiency anemia secondary to inadequate dietary iron intake      spironolactone (ALDACTONE) 25 MG tablet TAKE 1 TABLET BY MOUTH DAILY  Qty: 90 tablet, Refills: 3    Comments: **Patient requests 90 days supply**      acetaminophen (TYLENOL) 325 MG tablet Take 650 mg by mouth every 6 hours as needed for Pain            Current Discharge Medication List      STOP taking these medications       lisinopril (PRINIVIL;ZESTRIL) 5 MG tablet Comments:   Reason for Stopping:         miconazole (MICOTIN) 2 % powder Comments:   Reason for Stopping:                   Procedures:     Assessment on Discharge: Stable, improved     Discharge ROS:  A complete review of systems was asked and negative. Discharge Exam:  BP 98/63   Pulse 91   Temp 97.8 °F (36.6 °C) (Oral)   Resp 16   Ht 6' 1\" (1.854 m)   Wt 255 lb 15.3 oz (116.1 kg)   SpO2 95%   BMI 33.77 kg/m²     Gen: NAD  HEENT: NC/AT, moist mucous membranes, no oropharyngeal erythema or exudate  Neck: supple, trachea midline, no anterior cervical or SC LAD  Heart:  Normal s1/s2, RRR, no murmurs, gallops, or rubs.  no leg edema  Lungs:  CTA bilaterally, no wheeze,no rales or rhonchi, no use of accessory muscles  Abd: bowel sounds present, soft, nontender, nondistended, no masses  Extrem:  No clubbing, cyanosis,  no edema  Skin: no lesion or masses  Psych:  A & O x3  Neuro: grossly intact, moves all four extremities. Patient has some aphasia at baseline. Pertinent Studies During Hospital Stay:  Radiology:  Ct Head Wo Contrast    Result Date: 7/27/2019  EXAMINATION: CT OF THE HEAD WITHOUT CONTRAST  7/27/2019 7:24 pm TECHNIQUE: CT of the head was performed without the administration of intravenous contrast. Dose modulation, iterative reconstruction, and/or weight based adjustment of the mA/kV was utilized to reduce the radiation dose to as low as reasonably achievable. COMPARISON: 03/18/2019 HISTORY: ORDERING SYSTEM PROVIDED HISTORY: slurred speach TECHNOLOGIST PROVIDED HISTORY: Has a \"code stroke\" or \"stroke alert\" been called? ->No Reason for Exam: slurred speech x 1 week Acuity: Acute Type of Exam: Initial FINDINGS: BRAIN/VENTRICLES: There is no acute intracranial hemorrhage, mass effect or midline shift. No abnormal extra-axial fluid collection. The gray-white differentiation is maintained without evidence of an acute infarct. There is prominence of the ventricles and sulci due to global parenchymal volume loss. There are nonspecific areas of hypoattenuation within the periventricular and subcortical white matter, which likely represent chronic microvascular ischemic change. ORBITS: The visualized portion of the orbits demonstrate no acute abnormality. SINUSES: The visualized paranasal sinuses and mastoid air cells demonstrate no acute abnormality. SOFT TISSUES/SKULL: No acute abnormality of the visualized skull or soft tissues. No acute intracranial abnormality. Moderate cerebral atrophy appropriate for age. Mild chronic ischemic changes also age-appropriate. No significant change from the prior study.      Ct Guided HISTORY: Reason for exam:->right chest tube Reason for Exam: right chest tube Acuity: Acute Type of Exam: Initial FINDINGS: Cardiac leads project over the chest.  Port is seen in the left chest wall. Pigtail catheter is seen at the inferior right hemithorax. Diffuse bilateral heterogeneous pulmonary opacity is again demonstrated, similar to prior. Thickening of the horizontal fissure is similar to prior. No gross pneumothorax. Cardiac and mediastinal silhouettes are unchanged. Costophrenic angles are not well seen. No significant interval change in bilateral heterogeneous opacity, suggestive of edema or pneumonia. Possible trace pleural effusions. Xr Chest Portable    Result Date: 8/3/2019  EXAMINATION: ONE XRAY VIEW OF THE CHEST 8/3/2019 5:37 am COMPARISON: 07/30/2019 HISTORY: ORDERING SYSTEM PROVIDED HISTORY: right chest tube TECHNOLOGIST PROVIDED HISTORY: Reason for exam:->right chest tube Reason for Exam: right chest tube Acuity: Acute Type of Exam: Subsequent/Follow-up FINDINGS: Cardiac leads project over the chest.  Port is seen in the left chest wall. Pigtail catheter is seen within the inferior right hemithorax. No gross pneumothorax. Decreased right-sided pleural fluid as compared to prior. Diffuse bilateral heterogeneous pulmonary opacity is again seen, slightly decreased on the right. Cardiac and mediastinal silhouettes are similar to prior. Decreasing right pleural effusion, with right chest tube in place. Bilateral airspace disease, suggestive of edema or pneumonia, with improving aeration of the right as compared to prior. Xr Chest Portable    Result Date: 7/30/2019  EXAMINATION: ONE XRAY VIEW OF THE CHEST 7/30/2019 8:45 am COMPARISON: 07/28/2019 HISTORY: ORDERING SYSTEM PROVIDED HISTORY: SOB TECHNOLOGIST PROVIDED HISTORY: Reason for exam:->SOB Reason for Exam: SOB, aspiration concern Acuity: Acute Type of Exam: Initial Follow-up study. FINDINGS: The heart is mildly enlarged. pleural effusion. Associated right lower lobe opacity is nonspecific but likely atelectasis. Scarring at the left lung apex with a nodular component measuring 14 mm series 8, image 79.  6 mm nodule adjacent to a vessel within the left upper lobe series 8, image 92. Small left lower lobe opacity is nonspecific but likely atelectasis. Soft Tissues/Bones: No axillary adenopathy. Thoracic spine degenerative changes. Remote appearing T12 compression fracture. Abdomen/Pelvis: Organs: Hepatic cirrhosis. No focal liver lesion. Cholelithiasis without CT evidence of acute cholecystitis. Fatty atrophy of the pancreas. No focal pancreatic lesion. Punctate calcifications within the spleen are likely related to prior granulomatous disease. No adrenal lesion. No hydronephrosis. No renal calculus. No renal lesion. GI/Bowel: No bowel obstruction. No acute inflammatory process. No appendicitis. Pelvis: No free fluid. No bladder calculus. Peritoneum/Retroperitoneum: Atherosclerotic calcification of the abdominal aorta without aneurysmal dilatation. No adenopathy. Bones/Soft Tissues: No acute soft tissue abnormality. Umbilical hernia contains fat. Multiple remote rib fractures. No acute osseous findings. Lumbar spine degenerative changes. Moderate right pleural effusion. No other acute findings within the chest. No acute intra-abdominal process. Hepatic cirrhosis. Cholelithiasis without CT evidence of acute cholecystitis. T12 compression fracture appears remote. Multiple remote rib fractures. No definite acute rib fracture. Fluoroscopy Modified Barium Swallow With Video    Result Date: 8/1/2019  EXAMINATION: MODIFIED BARIUM SWALLOW WAS PERFORMED IN CONJUNCTION WITH [facility] TECHNIQUE: Fluoroscopic evaluation of the swallowing mechanism was performed with multiple consistency of barium product. FLUOROSCOPY DOSE AND TYPE OR TIME AND EXPOSURES: 3.7 minutes of fluoroscopy time were utilized.

## 2019-08-06 NOTE — CARE COORDINATION
Discharge Planning:  SW received a call from [facility] stating that [facility] has a bed for this pt and can accept this pt this evening. SW contacted [facility] and arranged stretcher transportation for 1630. Pt will be going to room 40 at [facility]. DISCHARGE SUMMARY     DATE OF DISCHARGE: 07/27/2019    DISCHARGE DESTINATION: [facility] Room 40    FACILITY    Level of Care: Acute Rehab    Report Number: [phone]  Fax Number:  [phone]      TRANSPORTATION: [place] [address] Name:  [address] [place] [address] up Time: 3086    Phone Number: [phone]    COMMENTS: Family, RN and facility aware of d/c plan and time.   Electronically signed by Landon Tang on 8/6/2019 at 3:32 PM

## 2019-08-06 NOTE — PROGRESS NOTES
Occupational Therapy  Facility/Department: [address] PROGRESSIVE CARE  Daily Treatment Note  NAME: Ale Mason  : 1945  MRN: [id number]    Date of Service: 2019    Discharge Recommendations:  5-7 sessions per week, Patient would benefit from continued therapy after discharge  OT Equipment Recommendations  Other: defer to [address] [place] [address] scored a 8/24 on the AM-PAC ADL Inpatient form. Current research shows that an AM-PAC score of 17 or less is typically not associated with a discharge to the patient's home setting. Based on the patients AM-PAC score and their current ADL deficits, it is recommended that the patient have 5-7 sessions per week of Occupational Therapy at d/c to increase the patients independence. Assessment   Performance deficits / Impairments: Decreased functional mobility ; Decreased ADL status; Decreased strength;Decreased ROM; Decreased cognition;Decreased endurance;Decreased safe awareness;Decreased balance;Decreased fine motor control;Decreased coordination;Decreased high-level IADLs  Assessment: Discussed with OTR: Pt tolerated session fairly well, limited by decreased activity tolerance/endurance, yet is making gradual progress, today completing bed mob with min/Mod A x2 and sit>stands off bed, commode, recliner with Max A x2.(lift equipment used for transfers between surfaces). Cont with poc to maximize function and recommend further OT at d/c prior to returning home. Prognosis: Good  OT Education: OT Role;Transfer Training  Patient Education: call for assist  REQUIRES OT FOLLOW UP: Yes  Activity Tolerance  Activity Tolerance: Patient limited by fatigue  Safety Devices  Safety Devices in place: Ian Montes aware)  Type of devices: Call light within reach; Chair alarm in place; Left in chair;Gait belt;Nurse notified         Patient Diagnosis(es): The primary encounter diagnosis was Congestive heart failure, unspecified HF chronicity, unspecified heart failure type ([facility] [address] A diagnosis of Pleural effusion was also pertinent to this visit. has a past medical history of Atrial fibrillation ([facility] [address] B-cell lymphoma ([facility] [address] Back pain, Cancer ([facility] [address] Cerebral infarction ([facility] [address] CHF (congestive heart failure) ([facility] [address] Hypertension, Hypokalemia, Infestation by bed bug, and Obesity. has a past surgical history that includes joint replacement (Right, 2010); knee surgery; fine needle aspiration; and Tunneled venous port placement (Left, 09/02/2016). Restrictions  Restrictions/Precautions  Restrictions/Precautions: Fall Risk  Position Activity Restriction  Other position/activity restrictions: 2 L O2, rossi, chest tube  Subjective   General  Chart Reviewed: Yes  Patient assessed for rehabilitation services?: Yes  Additional Pertinent Hx: Per Javier Medina MD's consult note 7/28: Izabella Terrazas is a 69y.o. year old male with a history of a aphasia status post stroke, CHF, DLCBL who presents with frequent falls, worsening dysphagia, and shortness of breath. Patient states he has been falling a couple times a week chronically, but has increased in frequency more recently. Is on Xarelto for A. fib. This is been held for planned thoracentesis today. Patient is e currently is on 2 L of oxygen he does not normally have a home oxygen requirement. On arrival found to be hypoxic and CT scan of the chest showed a large right-sided pleural effusion. BNP also elevated to 2000. \"  Pt s/p thoracentesis 7/28. Response to previous treatment: Patient unable to report, no changes reported from family or staff  Family / Caregiver Present: No  Referring Practitioner: Shauna Becerra MD  Diagnosis: R pleural effusion, Acute Hypoxemic Respiratory Failure , CT placement 8/2  Subjective  Subjective: Pt seen BS, in bed. Pt agreeable to OT/PT for co-tx. Pt denies pain and requesting to use commode for BM.   General Comment  Comments: Per RN ok for therapy, no restrictions following chest tube each stand to sit, for controlled descent. Pt tolerated fairly well. Cont with poc. Therapy Time:10 min        3rd session: returned to room to assist pt back to bed. -co-tx with pt. Pt without complaints. Sit>stand onto robson stedy with Max A x2, off recliner. Stand to sit onto bed with Max A x2. Sit>supine onto flat hospital bed with Max A x2; rolling side/side with Min A x1 and used head board to reposition self up in bed, 2x, SBA (head in [place] position first time). Pt remained in bed, needs in reach. Cont poc. Therapy time:  10 min          Plan   Plan  Times per week: 3-5  Times per day: Daily  Current Treatment Recommendations: Strengthening, Balance Training, Functional Mobility Training, Endurance Training, Cognitive Reorientation, Cognitive/Perceptual Training, Self-Care / ADL, Safety Education & Training, ROM, Wheelchair Mobility Training    AM-PAC Score        AM-PAC Inpatient Daily Activity Raw Score: 8 (08/06/19 0901)  AM-PAC Inpatient ADL T-Scale Score : 22.86 (08/06/19 0901)  ADL Inpatient CMS 0-100% Score: 85.69 (08/06/19 0901)  ADL Inpatient CMS G-Code Modifier : CM (08/06/19 0901)    Goals  Short term goals  Time Frame for Short term goals: Prior to d/c: 8/6:  pt progressing towards goals  Short term goal 1: Pt will complete UB bathing/dressing with min A. Short term goal 2: Pt will complete LB bathing/dressing with max A. Short term goal 3: Pt will complete toileting with max A. Short term goal 4: Pt will complete grooming with min A. Short term goal 5: Pt will complete feeding with set-up/mod I. Short term goal 6: Pt will complete fxl transfer to/from ADL surfaces with mod A x1-2 using AD/lift equipment prn. Short term goal 7: Pt will tolerate 10-15 minutes of B UE therex to improve strength/endurance for ADLs. Long term goals  Time Frame for Long term goals : STG=LTG  Patient Goals   Patient goals : \"I want to get out of here. \"       Therapy Time   Co-treatment   Time In   0815   Time Out   0845   Minutes   30     Therapy Time     Co-treatment   Time In 1000   Time Out 1010   Minutes 10   Therapy Time     Co-treatment   Time In 1336   Time Out 8897   Minutes 11       Joycelyn DÍAZ/L,515  This note to serve as discharge summary if pt d/c'd prior to next session

## 2019-08-06 NOTE — PROGRESS NOTES
this visit. has a past medical history of Atrial fibrillation ([facility] [address] B-cell lymphoma ([facility] Back pain, Cancer ([facility] Cerebral infarction ([facility] CHF (congestive heart failure) ([facility] Hypertension, Hypokalemia, Infestation by bed bug, and Obesity. has a past surgical history that includes joint replacement (Right, 2010); knee surgery; fine needle aspiration; and Tunneled venous port placement (Left, 09/02/2016). Restrictions  Restrictions/Precautions  Restrictions/Precautions: Fall Risk  Position Activity Restriction  Other position/activity restrictions: 2 L O2, rossi, chest tube     Subjective   General  Chart Reviewed: Yes  Subjective  Subjective: Pt presents in bed, reports no pain and is feeling slightly better than yesterday, and is agreeable to OOB activity. Orientation  Orientation  Overall Orientation Status: Within Functional Limits(Limited ability to answer questions, physically follows commands)    Objective      Bed mobility  Supine to Sit: Minimal assistance; Moderate assistance;2 Person assistance(HOB raised and use of rail)     Transfers  Sit to Stand: 2 Person Assistance;Maximum Assistance(use of Stedy)  Stand to sit: Maximum Assistance;2 Person Assistance(use of Stedy)  Bed to Chair: Dependent/Total;2 Person Assistance(x2 using Stedy)  Comment: Pt performed multiple toilet transfers, OT assisted with danyell-care while PT helped to maintain pt's standing balance. Ambulation  Ambulation?: No     Balance  Sitting - Static: Fair  Standing - Static: Poor  Comments: Pt was able to maintain sitting balance at EOB with mild unsteadiness but no overt LOB.        AM-PAC Score  AM-PAC Inpatient Mobility Raw Score : 9 (08/06/19 0909)  AM-PAC Inpatient T-Scale Score : 30.55 (08/06/19 0909)  Mobility Inpatient CMS 0-100% Score: 81.38 (08/06/19 0909)  Mobility Inpatient CMS G-Code Modifier : CM (08/06/19 4763)     Goals  Short term goals  Time Frame for Short term goals: upon d/c  Short term goal

## 2019-08-06 NOTE — PROGRESS NOTES
Problems:    * No resolved hospital problems. *      Plan:     1. History DLBCL/pleural effusion--cytology times two and flow cytometry negative for recurrent lymphoma. Will sign off. Followup with Dr. Charles Smith as an outpt.     2. ID--AF        Electronically signed by Holly Bailey MD on 8/6/2019 at 11:25 AM

## 2019-08-27 ENCOUNTER — OFFICE VISIT (OUTPATIENT)
Dept: PULMONOLOGY | Age: 74
End: 2019-08-27
Payer: MEDICARE

## 2019-08-27 ENCOUNTER — TELEPHONE (OUTPATIENT)
Dept: PULMONOLOGY | Age: 74
End: 2019-08-27

## 2019-08-27 VITALS
SYSTOLIC BLOOD PRESSURE: 124 MMHG | DIASTOLIC BLOOD PRESSURE: 72 MMHG | RESPIRATION RATE: 20 BRPM | OXYGEN SATURATION: 98 % | HEART RATE: 91 BPM

## 2019-08-27 DIAGNOSIS — J96.01 ACUTE RESPIRATORY FAILURE WITH HYPOXIA (HCC): Primary | ICD-10-CM

## 2019-08-27 DIAGNOSIS — J90 PLEURAL EFFUSION: ICD-10-CM

## 2019-08-27 PROCEDURE — 3017F COLORECTAL CA SCREEN DOC REV: CPT | Performed by: INTERNAL MEDICINE

## 2019-08-27 PROCEDURE — G8598 ASA/ANTIPLAT THER USED: HCPCS | Performed by: INTERNAL MEDICINE

## 2019-08-27 PROCEDURE — 4040F PNEUMOC VAC/ADMIN/RCVD: CPT | Performed by: INTERNAL MEDICINE

## 2019-08-27 PROCEDURE — G8417 CALC BMI ABV UP PARAM F/U: HCPCS | Performed by: INTERNAL MEDICINE

## 2019-08-27 PROCEDURE — 1111F DSCHRG MED/CURRENT MED MERGE: CPT | Performed by: INTERNAL MEDICINE

## 2019-08-27 PROCEDURE — 1036F TOBACCO NON-USER: CPT | Performed by: INTERNAL MEDICINE

## 2019-08-27 PROCEDURE — 1123F ACP DISCUSS/DSCN MKR DOCD: CPT | Performed by: INTERNAL MEDICINE

## 2019-08-27 PROCEDURE — G8427 DOCREV CUR MEDS BY ELIG CLIN: HCPCS | Performed by: INTERNAL MEDICINE

## 2019-08-27 PROCEDURE — 99214 OFFICE O/P EST MOD 30 MIN: CPT | Performed by: INTERNAL MEDICINE

## 2019-08-27 NOTE — PROGRESS NOTES
REASON FOR CONSULTATION:  Chief Complaint   Patient presents with    Follow-Up from Hospital     acute respiratory failure         Consult at request of Juan Gonzales MD     PCP: Juan Gonzales MD    HISTORY OF PRESENT ILLNESS: Lupe Simmons is a 69y.o. year old male with a history of stroke with residual dysarthria and reported dysphasia, cirrhosis who presents for follow-up of large pleural effusion and chronic hypoxia that was initially noted on recent hospitalization. During hospitalization he had a chest tube placed. PFA consistent with exudate, but otherwise unrevealing work-up. He has a history of lymphoma but cytology x2 with flow were negative. Has follow-up with Dr. Richard Galarza later in September. Since discharge patient reports no increase in his dyspnea, breathing has been stable. He was supposed to have a follow-up chest x-ray done prior to this appointment but was not obtained. This is still pending. He remains on supplemental oxygen but his O2 sats are fine today. Initial concern of differential diagnosis for pleural effusion was recurrence of lymphoma versus likely hepatic hydrothorax. Past Medical History:   Diagnosis Date    Atrial fibrillation ([facility])     B-cell lymphoma (HCC)     Back pain     Cancer (HCC)     Cerebral infarction ([facility])     CHF (congestive heart failure) (HCC)     Hypertension     Hypokalemia     Infestation by bed bug     2018    Obesity        Past Surgical History:   Procedure Laterality Date    FINE NEEDLE ASPIRATION      JOINT REPLACEMENT Right 2010    right TKA Knee via Right    KNEE SURGERY      TUNNELED VENOUS PORT PLACEMENT Left 09/02/2016    DR. Tu MARIA       family history includes Alzheimer's Disease in his mother. SOCIAL HISTORY:   reports that he quit smoking about 8 years ago. He smoked 1.00 pack per day. He has never used smokeless tobacco.      ALLERGIES:  Patient has No Known Allergies.     REVIEW OF SYSTEMS:  Constitutional: Negative for fever   HENT: Negative for sore throat  Eyes: Negative for redness   Respiratory: Negative for dyspnea, cough  Cardiovascular: Negative for chest pain  Gastrointestinal: Negative for vomiting, diarrhea   Genitourinary: Negative for hematuria   Musculoskeletal: Negative for arthralgias   Skin: Negative for rash  Neurological: Negative for syncope  Hematological: Negative for adenopathy  Psychiatric/Behavorial: Negative for anxiety    Objective:   PHYSICAL EXAM:  Blood pressure 124/72, pulse 91, resp. rate 20, SpO2 98 %.'  Gen: No distress. Eyes: PERRL. No sclera icterus. No conjunctival injection. ENT: No discharge. Pharynx clear. External appearance of ears and nose normal.  Neck: Trachea midline. No obvious mass. Resp: No accessory muscle use. No crackles. No wheezes. No rhonchi. CV: Regular rate. Regular rhythm. No murmur or rub.  + Edema. GI: Non-tender.  + Distended.  + Umbilical hernia. Skin: Warm, dry, chronic venous stasis dermatitis lower extremities bilaterally. No nodule on exposed extremities. Lymph: No cervical LAD. No supraclavicular LAD. M/S: No cyanosis. No clubbing. No joint deformity. Neuro: Dysarthric, answers questions slowly but appropriately though. Psych: No anxiety. Awake. Alert. Intact judgement and insight.     Current Outpatient Medications   Medication Sig Dispense Refill    potassium chloride (KLOR-CON M) 20 MEQ extended release tablet TAKE 1 TABLET BY MOUTH  DAILY 180 tablet 2    atorvastatin (LIPITOR) 20 MG tablet TAKE 1 TABLET BY MOUTH DAILY 90 tablet 3    Emollient (DERMAPHOR) OINT ointment APPLY TOPICALLY TWICE DAILY AS NEEDED (Patient taking differently: APPLY TOPICALLY TWICE DAILY AS NEEDED FOR SKIN DRYNESS) 453.9 g 0    rivaroxaban (XARELTO) 20 MG TABS tablet TAKE 1 TABLET BY MOUTH DAILY 30 tablet 11    torsemide (DEMADEX) 20 MG tablet TAKE 2 TABLETS BY MOUTH TWICE DAILY 360 tablet 3    clotrimazole (LOTRIMIN) 1 % cream APPLY EXTERNALLY TO THE AFFECTED AREA TWICE DAILY (Patient taking differently: Apply 1 each topically 2 times daily as needed (irritation / fungal infection) ) 15 g 0    ASPIRIN LOW DOSE 81 MG EC tablet TAKE 1 TABLET BY MOUTH DAILY 90 tablet 2    metoprolol succinate (TOPROL XL) 25 MG extended release tablet TAKE 1 TABLET BY MOUTH DAILY 90 tablet 3    ferrous sulfate (BEST-GLORIA) 325 (65 Fe) MG tablet Take 1 tablet by mouth 2 times daily 60 tablet 11    spironolactone (ALDACTONE) 25 MG tablet TAKE 1 TABLET BY MOUTH DAILY 90 tablet 3    acetaminophen (TYLENOL) 325 MG tablet Take 650 mg by mouth every 6 hours as needed for Pain        No current facility-administered medications for this visit. Data Reviewed:   CBC and Renal reviewed  Last CBC  Lab Results   Component Value Date    WBC 8.8 08/06/2019    RBC 3.04 08/06/2019    RBC 3.84 06/01/2017    HGB 10.2 08/06/2019    .0 08/06/2019     08/06/2019     Last Renal  Lab Results   Component Value Date     08/06/2019    K 3.5 08/06/2019    K 3.5 08/06/2019    CL 87 08/06/2019    CO2 38 08/06/2019    CO2 37 08/05/2019    CO2 38 08/04/2019    BUN 13 08/06/2019    CREATININE 0.7 08/06/2019    GLUCOSE 106 08/06/2019    GLUCOSE 114 06/01/2017    CALCIUM 8.5 08/06/2019       Last ABG  POC Blood Gas: No results found for: POCPH, POCPCO2, POCPO2, POCHCO3, NBEA, XFXH3DIS  No results for input(s): PH, PCO2, PO2, HCO3, BE, O2SAT in the last 72 hours. Radiology Review:  Pertinent images / reports were reviewed as a part of this visit. CT Chest w/ contrast: No results found for this or any previous visit.     CT Chest w/o contrast:   Results for orders placed during the hospital encounter of 07/29/16   CT Chest WO Contrast    Narrative EXAMINATION:  CT OF THE CHEST WITHOUT CONTRAST 7/29/2016 11:23 am    TECHNIQUE:  CT of the chest was performed without the administration of intravenous  contrast. Multiplanar reformatted images are HISTORY:  Technologist Provided Reason for Exam: Chest Discomfort  Acuity: Acute  Type of Encounter: Initial    FINDINGS:  Patchy airspace opacities noted in the left mid to lower lung, greater  centrally. The right lung is clear. Elida catheter is noted. No  pneumothorax is found. No free air. No acute bony abnormality is detected. Impression Patchy airspace opacity in the left mid to lower lungs, greater centrally,  pneumonia versus atelectasis versus scarring. CXR portable:   Results for orders placed during the hospital encounter of 07/27/19   XR CHEST PORTABLE    Narrative EXAMINATION:  ONE XRAY VIEW OF THE CHEST    8/6/2019 4:16 am    COMPARISON:  Chest radiographs 08/05/2019, 08/04/2019    HISTORY:  ORDERING SYSTEM PROVIDED HISTORY: right chest tube  TECHNOLOGIST PROVIDED HISTORY:  Reason for exam:->right chest tube  Reason for Exam: right chest tube  Type of Exam: Ongoing  Relevant Medical/Surgical History: hx lymphoma, chf, hypertension    FINDINGS:  Stable right pleural pigtail catheter positioning. No pneumothorax. Bilateral pleural effusions with left basilar opacities. Right mid lung  atelectasis and opacities are unchanged. Stable cardiomediastinal contours. Hilar indistinctness persists. Impression No acute interval cardiopulmonary change. Specifically, stable right pleural  pigtail catheter positioning, pleural effusions and airspace disease. Assessment/Plan:       Diagnosis Orders   1. Acute respiratory failure with hypoxia ([address]     2. Pleural effusion           Problem List Items Addressed This Visit        Pulmonary Problems    Pleural effusion     -Unclear etiology, cytology x2-. Flow was negative. Given his history of lymphoma this is of high concern however we have found no evidence of recurrence in his pleural fluid.   -Given his underlying hepatic dysfunction and ascites this very well could represent hepatic hydrothorax especially given right-sided nature. Unclear why exudative if due to ascites. -Repeat chest x-ray now to see if it has grown or shrunk, however patient symptoms are unremarkable. -If it is hepatic hydrothorax the mainstay of therapy would be diuresis, repeat thoracentesis/drainage should be avoided. Acute respiratory failure with hypoxia ([address] - Primary     -Clear what patient's true oxygen requirement is. Currently on modest amount of oxygen with SPO2 of 98%. --Wean supplemental oxygen to goal saturation of >90%  -Suspect he may not have a chronic oxygen requirement                    This note was transcribed using [id number] [employer]. Please disregard any translational errors.     Jay Lawton [address] [facility]

## 2019-08-27 NOTE — ASSESSMENT & PLAN NOTE
-Clear what patient's true oxygen requirement is. Currently on modest amount of oxygen with SPO2 of 98%.   --Wean supplemental oxygen to goal saturation of >90%  -Suspect he may not have a chronic oxygen requirement

## 2019-08-29 NOTE — TELEPHONE ENCOUNTER
Received the report for the chest xray. I tried to call [facility] and left a message on the supervisor vm . I explained on the vm that they would need to request the images then call our office to let us know when they would be available for our  to .

## 2019-09-05 NOTE — TELEPHONE ENCOUNTER
Left message at the [address] [place], [place] [address] phone [phone] that I am again trying to have someone let me know when the images will be at the care facility so that our  can  & that I have attempted to send a fax to [facility] to request someone call me back. Julius Piedra unit clerk at [phone] [facility] said that the patient was discharged 8/29/19. transf to Charity Ricardo  left a vm at her number that I am still needing to get the images to our facility from their facility.

## 2019-09-10 ENCOUNTER — TELEPHONE (OUTPATIENT)
Dept: INTERNAL MEDICINE CLINIC | Age: 74
End: 2019-09-10

## 2019-09-10 NOTE — TELEPHONE ENCOUNTER
Dr. Katarzyna Angeles I received the CD images and will take to the hospital today for you to review.

## 2019-09-26 ENCOUNTER — TELEPHONE (OUTPATIENT)
Dept: PULMONOLOGY | Age: 74
End: 2019-09-26

## 2019-09-26 DIAGNOSIS — R93.89 ABNORMAL CHEST X-RAY: ICD-10-CM

## 2019-09-26 DIAGNOSIS — R91.1 LUNG NODULE: Primary | ICD-10-CM

## 2019-10-04 LAB
BUN BLDV-MCNC: 19 MG/DL
CALCIUM SERPL-MCNC: 8.7 MG/DL
CHLORIDE BLD-SCNC: 101 MMOL/L
CO2: 30 MMOL/L
CREAT SERPL-MCNC: 0.8 MG/DL
GFR CALCULATED: NORMAL
GLUCOSE BLD-MCNC: 102 MG/DL
HCT VFR BLD CALC: 30.8 % (ref 41–53)
HEMOGLOBIN: 10 G/DL (ref 13.5–17.5)
PLATELET # BLD: 216 K/ΜL
POTASSIUM SERPL-SCNC: 3.4 MMOL/L
SODIUM BLD-SCNC: 137 MMOL/L
WBC # BLD: 6.7 10^3/ML

## 2019-10-18 ENCOUNTER — HOSPITAL ENCOUNTER (OUTPATIENT)
Dept: CT IMAGING | Age: 74
Discharge: HOME | End: 2019-10-18
Payer: MEDICARE

## 2019-10-18 DIAGNOSIS — R91.1 LUNG NODULE: ICD-10-CM

## 2019-10-18 DIAGNOSIS — R93.89 ABNORMAL CHEST X-RAY: ICD-10-CM

## 2019-10-18 PROCEDURE — 71250 CT THORAX DX C-: CPT

## 2019-10-21 ENCOUNTER — TELEPHONE (OUTPATIENT)
Dept: INTERNAL MEDICINE CLINIC | Age: 74
End: 2019-10-21

## 2019-10-25 ENCOUNTER — TELEPHONE (OUTPATIENT)
Dept: INTERNAL MEDICINE CLINIC | Age: 74
End: 2019-10-25

## 2019-10-29 ENCOUNTER — APPOINTMENT (OUTPATIENT)
Dept: CT IMAGING | Age: 74
End: 2019-10-29
Payer: MEDICARE

## 2019-10-29 ENCOUNTER — APPOINTMENT (OUTPATIENT)
Dept: GENERAL RADIOLOGY | Age: 74
End: 2019-10-29
Payer: MEDICARE

## 2019-10-29 ENCOUNTER — HOSPITAL ENCOUNTER (EMERGENCY)
Age: 74
Discharge: HOME | End: 2019-10-29
Attending: EMERGENCY MEDICINE
Payer: MEDICARE

## 2019-10-29 VITALS
WEIGHT: 261.91 LBS | TEMPERATURE: 98.2 F | OXYGEN SATURATION: 100 % | HEIGHT: 73 IN | BODY MASS INDEX: 34.71 KG/M2 | HEART RATE: 82 BPM | SYSTOLIC BLOOD PRESSURE: 118 MMHG | DIASTOLIC BLOOD PRESSURE: 76 MMHG | RESPIRATION RATE: 18 BRPM

## 2019-10-29 DIAGNOSIS — Z74.1 REQUIRES ASSISTANCE WITH ACTIVITIES OF DAILY LIVING (ADL): ICD-10-CM

## 2019-10-29 DIAGNOSIS — N30.01 ACUTE CYSTITIS WITH HEMATURIA: Primary | ICD-10-CM

## 2019-10-29 DIAGNOSIS — R53.1 GENERAL WEAKNESS: ICD-10-CM
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
## 2019-10-29 DIAGNOSIS — R10.9 FLANK PAIN: ICD-10-CM

## 2019-10-29 LAB
A/G RATIO: 1 (ref 1.1–2.2)
ALBUMIN SERPL-MCNC: 3.7 G/DL (ref 3.4–5)
ALP BLD-CCNC: 102 U/L (ref 40–129)
ALT SERPL-CCNC: 16 U/L (ref 10–40)
ANION GAP SERPL CALCULATED.3IONS-SCNC: 11 MMOL/L (ref 3–16)
AST SERPL-CCNC: 24 U/L (ref 15–37)
BACTERIA: ABNORMAL /HPF
BASOPHILS ABSOLUTE: 0 K/UL (ref 0–0.2)
BASOPHILS RELATIVE PERCENT: 0.4 %
BILIRUB SERPL-MCNC: 0.7 MG/DL (ref 0–1)
BILIRUBIN URINE: NEGATIVE
BLOOD, URINE: ABNORMAL
BUN BLDV-MCNC: 18 MG/DL (ref 7–20)
CALCIUM SERPL-MCNC: 9.4 MG/DL (ref 8.3–10.6)
CHLORIDE BLD-SCNC: 98 MMOL/L (ref 99–110)
CLARITY: ABNORMAL
CO2: 29 MMOL/L (ref 21–32)
COLOR: YELLOW
CREAT SERPL-MCNC: 0.7 MG/DL (ref 0.8–1.3)
EOSINOPHILS ABSOLUTE: 0.3 K/UL (ref 0–0.6)
EOSINOPHILS RELATIVE PERCENT: 4 %
EPITHELIAL CELLS, UA: 1 /HPF (ref 0–5)
GFR AFRICAN AMERICAN: >60
GFR NON-AFRICAN AMERICAN: >60
GLOBULIN: 3.8 G/DL
GLUCOSE BLD-MCNC: 113 MG/DL (ref 70–99)
GLUCOSE URINE: NEGATIVE MG/DL
HCT VFR BLD CALC: 31.7 % (ref 40.5–52.5)
HEMOGLOBIN: 10.5 G/DL (ref 13.5–17.5)
HYALINE CASTS: 6 /LPF (ref 0–8)
INR BLD: 1.36 (ref 0.86–1.14)
KETONES, URINE: NEGATIVE MG/DL
LACTIC ACID: 1 MMOL/L (ref 0.4–2)
LEUKOCYTE ESTERASE, URINE: ABNORMAL
LYMPHOCYTES ABSOLUTE: 0.6 K/UL (ref 1–5.1)
LYMPHOCYTES RELATIVE PERCENT: 7.2 %
MCH RBC QN AUTO: 32.5 PG (ref 26–34)
MCHC RBC AUTO-ENTMCNC: 33 G/DL (ref 31–36)
MCV RBC AUTO: 98.6 FL (ref 80–100)
MICROSCOPIC EXAMINATION: YES
MONOCYTES ABSOLUTE: 0.7 K/UL (ref 0–1.3)
MONOCYTES RELATIVE PERCENT: 9.1 %
NEUTROPHILS ABSOLUTE: 6.5 K/UL (ref 1.7–7.7)
NEUTROPHILS RELATIVE PERCENT: 79.3 %
NITRITE, URINE: POSITIVE
PDW BLD-RTO: 15.1 % (ref 12.4–15.4)
PH UA: 5.5 (ref 5–8)
PLATELET # BLD: 139 K/UL (ref 135–450)
PMV BLD AUTO: 7.9 FL (ref 5–10.5)
POTASSIUM SERPL-SCNC: 3.9 MMOL/L (ref 3.5–5.1)
PROTEIN UA: NEGATIVE MG/DL
PROTHROMBIN TIME: 15.5 SEC (ref 9.8–13)
RBC # BLD: 3.22 M/UL (ref 4.2–5.9)
RBC UA: 2 /HPF (ref 0–4)
SODIUM BLD-SCNC: 138 MMOL/L (ref 136–145)
SPECIFIC GRAVITY UA: 1.02 (ref 1–1.03)
TOTAL PROTEIN: 7.5 G/DL (ref 6.4–8.2)
URINE REFLEX TO CULTURE: YES
URINE TYPE: ABNORMAL
UROBILINOGEN, URINE: 1 E.U./DL
WBC # BLD: 8.2 K/UL (ref 4–11)
WBC UA: 137 /HPF (ref 0–5)

## 2019-10-29 PROCEDURE — 2580000003 HC RX 258: Performed by: PHYSICIAN ASSISTANT

## 2019-10-29 PROCEDURE — 6360000002 HC RX W HCPCS: Performed by: PHYSICIAN ASSISTANT

## 2019-10-29 PROCEDURE — 87186 SC STD MICRODIL/AGAR DIL: CPT

## 2019-10-29 PROCEDURE — 87040 BLOOD CULTURE FOR BACTERIA: CPT

## 2019-10-29 PROCEDURE — 6370000000 HC RX 637 (ALT 250 FOR IP): Performed by: NURSE PRACTITIONER

## 2019-10-29 PROCEDURE — 81001 URINALYSIS AUTO W/SCOPE: CPT

## 2019-10-29 PROCEDURE — 83605 ASSAY OF LACTIC ACID: CPT

## 2019-10-29 PROCEDURE — 85025 COMPLETE CBC W/AUTO DIFF WBC: CPT

## 2019-10-29 PROCEDURE — 80053 COMPREHEN METABOLIC PANEL: CPT

## 2019-10-29 PROCEDURE — 71045 X-RAY EXAM CHEST 1 VIEW: CPT

## 2019-10-29 PROCEDURE — 99285 EMERGENCY DEPT VISIT HI MDM: CPT

## 2019-10-29 PROCEDURE — 87077 CULTURE AEROBIC IDENTIFY: CPT

## 2019-10-29 PROCEDURE — 96365 THER/PROPH/DIAG IV INF INIT: CPT

## 2019-10-29 PROCEDURE — 85610 PROTHROMBIN TIME: CPT

## 2019-10-29 PROCEDURE — 96361 HYDRATE IV INFUSION ADD-ON: CPT

## 2019-10-29 PROCEDURE — 74176 CT ABD & PELVIS W/O CONTRAST: CPT

## 2019-10-29 PROCEDURE — 87086 URINE CULTURE/COLONY COUNT: CPT

## 2019-10-29 RX ORDER — CEFDINIR 300 MG/1
300 CAPSULE ORAL 2 TIMES DAILY
Qty: 20 CAPSULE | Refills: 0 | Status: SHIPPED | OUTPATIENT
Start: 2019-10-29 | End: 2019-11-08

## 2019-10-29 RX ORDER — 0.9 % SODIUM CHLORIDE 0.9 %
1000 INTRAVENOUS SOLUTION INTRAVENOUS ONCE
Status: COMPLETED | OUTPATIENT
Start: 2019-10-29 | End: 2019-10-29

## 2019-10-29 RX ORDER — UBIDECARENONE 75 MG
100 CAPSULE ORAL DAILY
COMMUNITY

## 2019-10-29 RX ORDER — ACETAMINOPHEN 500 MG
1000 TABLET ORAL ONCE
Status: COMPLETED | OUTPATIENT
Start: 2019-10-29 | End: 2019-10-29

## 2019-10-29 RX ORDER — FOLIC ACID 1 MG/1
1 TABLET ORAL DAILY
COMMUNITY

## 2019-10-29 RX ADMIN — ACETAMINOPHEN 1000 MG: 500 TABLET ORAL at 11:14

## 2019-10-29 RX ADMIN — SODIUM CHLORIDE 1000 ML: 9 INJECTION, SOLUTION INTRAVENOUS at 15:30

## 2019-10-29 RX ADMIN — SODIUM CHLORIDE 1000 ML: 9 INJECTION, SOLUTION INTRAVENOUS at 12:33

## 2019-10-29 RX ADMIN — CEFTRIAXONE 1 G: 1 INJECTION, POWDER, FOR SOLUTION INTRAMUSCULAR; INTRAVENOUS at 12:33

## 2019-10-29 ASSESSMENT — PAIN DESCRIPTION - PAIN TYPE
TYPE: ACUTE PAIN
TYPE: ACUTE PAIN

## 2019-10-29 ASSESSMENT — PAIN SCALES - GENERAL
PAINLEVEL_OUTOF10: 4
PAINLEVEL_OUTOF10: 7
PAINLEVEL_OUTOF10: 0
PAINLEVEL_OUTOF10: 8

## 2019-10-29 ASSESSMENT — PAIN DESCRIPTION - DESCRIPTORS
DESCRIPTORS: ACHING
DESCRIPTORS: ACHING

## 2019-10-29 ASSESSMENT — PAIN - FUNCTIONAL ASSESSMENT
PAIN_FUNCTIONAL_ASSESSMENT: ACTIVITIES ARE NOT PREVENTED
PAIN_FUNCTIONAL_ASSESSMENT: ACTIVITIES ARE NOT PREVENTED

## 2019-10-29 ASSESSMENT — PAIN DESCRIPTION - LOCATION: LOCATION: RIB CAGE

## 2019-10-29 ASSESSMENT — PAIN DESCRIPTION - PROGRESSION
CLINICAL_PROGRESSION: GRADUALLY WORSENING
CLINICAL_PROGRESSION: GRADUALLY IMPROVING
CLINICAL_PROGRESSION: RESOLVED

## 2019-10-29 ASSESSMENT — PAIN DESCRIPTION - ONSET
ONSET: ON-GOING
ONSET: ON-GOING

## 2019-10-29 ASSESSMENT — PAIN DESCRIPTION - ORIENTATION
ORIENTATION: RIGHT
ORIENTATION: RIGHT

## 2019-10-29 ASSESSMENT — PAIN DESCRIPTION - FREQUENCY
FREQUENCY: CONTINUOUS
FREQUENCY: CONTINUOUS

## 2019-10-31 LAB
ORGANISM: ABNORMAL
URINE CULTURE, ROUTINE: ABNORMAL

## 2019-11-01 ENCOUNTER — TELEPHONE (OUTPATIENT)
Dept: INTERNAL MEDICINE CLINIC | Age: 74
End: 2019-11-01

## 2019-11-03 LAB
BLOOD CULTURE, ROUTINE: NORMAL
CULTURE, BLOOD 2: NORMAL

## 2019-11-30 ENCOUNTER — APPOINTMENT (OUTPATIENT)
Dept: GENERAL RADIOLOGY | Age: 74
DRG: 535 | End: 2019-11-30
Payer: MEDICARE

## 2019-11-30 ENCOUNTER — HOSPITAL ENCOUNTER (INPATIENT)
Age: 74
LOS: 10 days | Discharge: NURSING, REHAB OR LONG-TERM CARE | DRG: 535 | End: 2019-12-10
Attending: EMERGENCY MEDICINE | Admitting: INTERNAL MEDICINE
Payer: MEDICARE

## 2019-11-30 DIAGNOSIS — S72.001A CLOSED FRACTURE OF NECK OF RIGHT FEMUR, INITIAL ENCOUNTER (HCC): Primary | ICD-10-CM
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
## 2019-11-30 LAB
A/G RATIO: 1 (ref 1.1–2.2)
ALBUMIN SERPL-MCNC: 3.6 G/DL (ref 3.4–5)
ALP BLD-CCNC: 92 U/L (ref 40–129)
ALT SERPL-CCNC: 18 U/L (ref 10–40)
ANION GAP SERPL CALCULATED.3IONS-SCNC: 12 MMOL/L (ref 3–16)
AST SERPL-CCNC: 29 U/L (ref 15–37)
BASOPHILS ABSOLUTE: 0 K/UL (ref 0–0.2)
BASOPHILS RELATIVE PERCENT: 0.2 %
BILIRUB SERPL-MCNC: 0.9 MG/DL (ref 0–1)
BUN BLDV-MCNC: 19 MG/DL (ref 7–20)
CALCIUM SERPL-MCNC: 8.7 MG/DL (ref 8.3–10.6)
CHLORIDE BLD-SCNC: 103 MMOL/L (ref 99–110)
CO2: 28 MMOL/L (ref 21–32)
CREAT SERPL-MCNC: 0.6 MG/DL (ref 0.8–1.3)
EOSINOPHILS ABSOLUTE: 0.1 K/UL (ref 0–0.6)
EOSINOPHILS RELATIVE PERCENT: 1.2 %
GFR AFRICAN AMERICAN: >60
GFR NON-AFRICAN AMERICAN: >60
GLOBULIN: 3.5 G/DL
GLUCOSE BLD-MCNC: 108 MG/DL (ref 70–99)
HCT VFR BLD CALC: 33.5 % (ref 40.5–52.5)
HEMOGLOBIN: 10.7 G/DL (ref 13.5–17.5)
INR BLD: 1.31 (ref 0.86–1.14)
LYMPHOCYTES ABSOLUTE: 0.3 K/UL (ref 1–5.1)
LYMPHOCYTES RELATIVE PERCENT: 3.1 %
MAGNESIUM: 2.3 MG/DL (ref 1.8–2.4)
MCH RBC QN AUTO: 32.9 PG (ref 26–34)
MCHC RBC AUTO-ENTMCNC: 31.9 G/DL (ref 31–36)
MCV RBC AUTO: 103 FL (ref 80–100)
MONOCYTES ABSOLUTE: 0.8 K/UL (ref 0–1.3)
MONOCYTES RELATIVE PERCENT: 7.9 %
NEUTROPHILS ABSOLUTE: 9.1 K/UL (ref 1.7–7.7)
NEUTROPHILS RELATIVE PERCENT: 87.6 %
PDW BLD-RTO: 17.8 % (ref 12.4–15.4)
PLATELET # BLD: 99 K/UL (ref 135–450)
PMV BLD AUTO: 8 FL (ref 5–10.5)
POTASSIUM REFLEX MAGNESIUM: 3.5 MMOL/L (ref 3.5–5.1)
PRO-BNP: 2326 PG/ML (ref 0–124)
PROTHROMBIN TIME: 15.2 SEC (ref 10–13.2)
RBC # BLD: 3.25 M/UL (ref 4.2–5.9)
SODIUM BLD-SCNC: 143 MMOL/L (ref 136–145)
TOTAL PROTEIN: 7.1 G/DL (ref 6.4–8.2)
TROPONIN: 0.02 NG/ML
WBC # BLD: 10.4 K/UL (ref 4–11)

## 2019-11-30 PROCEDURE — 94640 AIRWAY INHALATION TREATMENT: CPT

## 2019-11-30 PROCEDURE — 83880 ASSAY OF NATRIURETIC PEPTIDE: CPT

## 2019-11-30 PROCEDURE — 84484 ASSAY OF TROPONIN QUANT: CPT

## 2019-11-30 PROCEDURE — 80053 COMPREHEN METABOLIC PANEL: CPT

## 2019-11-30 PROCEDURE — 85025 COMPLETE CBC W/AUTO DIFF WBC: CPT

## 2019-11-30 PROCEDURE — 96375 TX/PRO/DX INJ NEW DRUG ADDON: CPT

## 2019-11-30 PROCEDURE — 51702 INSERT TEMP BLADDER CATH: CPT

## 2019-11-30 PROCEDURE — 1200000000 HC SEMI PRIVATE

## 2019-11-30 PROCEDURE — 6360000002 HC RX W HCPCS: Performed by: NURSE PRACTITIONER

## 2019-11-30 PROCEDURE — 6360000002 HC RX W HCPCS: Performed by: EMERGENCY MEDICINE

## 2019-11-30 PROCEDURE — 96376 TX/PRO/DX INJ SAME DRUG ADON: CPT

## 2019-11-30 PROCEDURE — 6370000000 HC RX 637 (ALT 250 FOR IP): Performed by: NURSE PRACTITIONER

## 2019-11-30 PROCEDURE — 73502 X-RAY EXAM HIP UNI 2-3 VIEWS: CPT

## 2019-11-30 PROCEDURE — 2700000000 HC OXYGEN THERAPY PER DAY

## 2019-11-30 PROCEDURE — 96374 THER/PROPH/DIAG INJ IV PUSH: CPT

## 2019-11-30 PROCEDURE — 99285 EMERGENCY DEPT VISIT HI MDM: CPT

## 2019-11-30 PROCEDURE — 83735 ASSAY OF MAGNESIUM: CPT

## 2019-11-30 PROCEDURE — 93005 ELECTROCARDIOGRAM TRACING: CPT | Performed by: NURSE PRACTITIONER

## 2019-11-30 PROCEDURE — 85610 PROTHROMBIN TIME: CPT

## 2019-11-30 PROCEDURE — 71045 X-RAY EXAM CHEST 1 VIEW: CPT

## 2019-11-30 RX ORDER — MORPHINE SULFATE 2 MG/ML
2 INJECTION, SOLUTION INTRAMUSCULAR; INTRAVENOUS ONCE
Status: COMPLETED | OUTPATIENT
Start: 2019-11-30 | End: 2019-11-30

## 2019-11-30 RX ORDER — ONDANSETRON 2 MG/ML
4 INJECTION INTRAMUSCULAR; INTRAVENOUS ONCE
Status: COMPLETED | OUTPATIENT
Start: 2019-11-30 | End: 2019-11-30

## 2019-11-30 RX ORDER — MORPHINE SULFATE 4 MG/ML
4 INJECTION, SOLUTION INTRAMUSCULAR; INTRAVENOUS ONCE
Status: COMPLETED | OUTPATIENT
Start: 2019-11-30 | End: 2019-11-30

## 2019-11-30 RX ORDER — IPRATROPIUM BROMIDE AND ALBUTEROL SULFATE 2.5; .5 MG/3ML; MG/3ML
1 SOLUTION RESPIRATORY (INHALATION) ONCE
Status: COMPLETED | OUTPATIENT
Start: 2019-11-30 | End: 2019-11-30

## 2019-11-30 RX ORDER — FUROSEMIDE 10 MG/ML
40 INJECTION INTRAMUSCULAR; INTRAVENOUS ONCE
Status: COMPLETED | OUTPATIENT
Start: 2019-11-30 | End: 2019-11-30

## 2019-11-30 RX ADMIN — IPRATROPIUM BROMIDE AND ALBUTEROL SULFATE 1 AMPULE: 2.5; .5 SOLUTION RESPIRATORY (INHALATION) at 20:57

## 2019-11-30 RX ADMIN — MORPHINE SULFATE 4 MG: 4 INJECTION, SOLUTION INTRAMUSCULAR; INTRAVENOUS at 21:24

## 2019-11-30 RX ADMIN — IPRATROPIUM BROMIDE AND ALBUTEROL SULFATE 1 AMPULE: 2.5; .5 SOLUTION RESPIRATORY (INHALATION) at 20:56

## 2019-11-30 RX ADMIN — ONDANSETRON 4 MG: 2 INJECTION INTRAMUSCULAR; INTRAVENOUS at 21:24

## 2019-11-30 RX ADMIN — MORPHINE SULFATE 2 MG: 2 INJECTION, SOLUTION INTRAMUSCULAR; INTRAVENOUS at 20:10

## 2019-11-30 RX ADMIN — ONDANSETRON 4 MG: 2 INJECTION INTRAMUSCULAR; INTRAVENOUS at 20:10

## 2019-11-30 RX ADMIN — FUROSEMIDE 40 MG: 10 INJECTION, SOLUTION INTRAMUSCULAR; INTRAVENOUS at 21:25

## 2019-11-30 ASSESSMENT — PAIN DESCRIPTION - LOCATION: LOCATION: HIP

## 2019-11-30 ASSESSMENT — PAIN DESCRIPTION - DESCRIPTORS: DESCRIPTORS: ACHING

## 2019-11-30 ASSESSMENT — PAIN DESCRIPTION - ORIENTATION: ORIENTATION: RIGHT

## 2019-11-30 ASSESSMENT — PAIN DESCRIPTION - FREQUENCY: FREQUENCY: INTERMITTENT

## 2019-11-30 ASSESSMENT — PAIN SCALES - GENERAL
PAINLEVEL_OUTOF10: 2
PAINLEVEL_OUTOF10: 6
PAINLEVEL_OUTOF10: 3

## 2019-11-30 ASSESSMENT — PAIN DESCRIPTION - PAIN TYPE: TYPE: ACUTE PAIN

## 2019-11-30 ASSESSMENT — PAIN - FUNCTIONAL ASSESSMENT: PAIN_FUNCTIONAL_ASSESSMENT: PREVENTS OR INTERFERES SOME ACTIVE ACTIVITIES AND ADLS

## 2019-11-30 ASSESSMENT — PAIN DESCRIPTION - ONSET: ONSET: ON-GOING

## 2019-12-01 LAB
ABO/RH: NORMAL
ANION GAP SERPL CALCULATED.3IONS-SCNC: 13 MMOL/L (ref 3–16)
ANTIBODY SCREEN: NORMAL
APTT: 31 SEC (ref 24.2–36.2)
BASE EXCESS VENOUS: 5.1 MMOL/L
BILIRUBIN URINE: ABNORMAL
BLOOD, URINE: ABNORMAL
BUN BLDV-MCNC: 19 MG/DL (ref 7–20)
CALCIUM SERPL-MCNC: 8.6 MG/DL (ref 8.3–10.6)
CARBOXYHEMOGLOBIN: 3 %
CASTS: ABNORMAL /LPF
CHLORIDE BLD-SCNC: 103 MMOL/L (ref 99–110)
CLARITY: ABNORMAL
CO2: 28 MMOL/L (ref 21–32)
COLOR: ABNORMAL
COMMENT UA: ABNORMAL
CREAT SERPL-MCNC: 0.7 MG/DL (ref 0.8–1.3)
EKG ATRIAL RATE: 105 BPM
EKG DIAGNOSIS: NORMAL
EKG Q-T INTERVAL: 362 MS
EKG QRS DURATION: 98 MS
EKG QTC CALCULATION (BAZETT): 462 MS
EKG R AXIS: 85 DEGREES
EKG T AXIS: -7 DEGREES
EKG VENTRICULAR RATE: 98 BPM
EPITHELIAL CELLS, UA: 0 /HPF (ref 0–5)
GFR AFRICAN AMERICAN: >60
GFR NON-AFRICAN AMERICAN: >60
GLUCOSE BLD-MCNC: 166 MG/DL (ref 70–99)
GLUCOSE URINE: NEGATIVE MG/DL
HCO3 VENOUS: 34 MMOL/L (ref 23–29)
HCT VFR BLD CALC: 37.6 % (ref 40.5–52.5)
HEMOGLOBIN: 11.8 G/DL (ref 13.5–17.5)
INR BLD: 1.24 (ref 0.86–1.14)
KETONES, URINE: NEGATIVE MG/DL
LEUKOCYTE ESTERASE, URINE: ABNORMAL
MCH RBC QN AUTO: 33 PG (ref 26–34)
MCHC RBC AUTO-ENTMCNC: 31.3 G/DL (ref 31–36)
MCV RBC AUTO: 105.2 FL (ref 80–100)
METHEMOGLOBIN VENOUS: 0.6 %
MICROSCOPIC EXAMINATION: YES
NITRITE, URINE: NEGATIVE
O2 CONTENT, VEN: 12 ML/DL
O2 SAT, VEN: 76 %
O2 THERAPY: ABNORMAL
PCO2, VEN: 78.5 MMHG (ref 40–50)
PDW BLD-RTO: 17.8 % (ref 12.4–15.4)
PH UA: 5.5 (ref 5–8)
PH VENOUS: 7.26 (ref 7.35–7.45)
PLATELET # BLD: 102 K/UL (ref 135–450)
PMV BLD AUTO: 8.1 FL (ref 5–10.5)
PO2, VEN: 48 MMHG
POTASSIUM REFLEX MAGNESIUM: 4 MMOL/L (ref 3.5–5.1)
PRO-BNP: 4507 PG/ML (ref 0–124)
PROTEIN UA: 100 MG/DL
PROTHROMBIN TIME: 14.4 SEC (ref 10–13.2)
RBC # BLD: 3.57 M/UL (ref 4.2–5.9)
RBC UA: 183 /HPF (ref 0–4)
SODIUM BLD-SCNC: 144 MMOL/L (ref 136–145)
SPECIFIC GRAVITY UA: 1.02 (ref 1–1.03)
TCO2 CALC VENOUS: 36 MMOL/L
URINE REFLEX TO CULTURE: YES
URINE TYPE: ABNORMAL
UROBILINOGEN, URINE: 1 E.U./DL
WBC # BLD: 10.8 K/UL (ref 4–11)
WBC UA: 445 /HPF (ref 0–5)

## 2019-12-01 PROCEDURE — 82803 BLOOD GASES ANY COMBINATION: CPT

## 2019-12-01 PROCEDURE — 2700000000 HC OXYGEN THERAPY PER DAY

## 2019-12-01 PROCEDURE — 6360000002 HC RX W HCPCS: Performed by: INTERNAL MEDICINE

## 2019-12-01 PROCEDURE — 83880 ASSAY OF NATRIURETIC PEPTIDE: CPT

## 2019-12-01 PROCEDURE — 99223 1ST HOSP IP/OBS HIGH 75: CPT | Performed by: INTERNAL MEDICINE

## 2019-12-01 PROCEDURE — 81001 URINALYSIS AUTO W/SCOPE: CPT

## 2019-12-01 PROCEDURE — 87086 URINE CULTURE/COLONY COUNT: CPT

## 2019-12-01 PROCEDURE — 86900 BLOOD TYPING SEROLOGIC ABO: CPT

## 2019-12-01 PROCEDURE — 80048 BASIC METABOLIC PNL TOTAL CA: CPT

## 2019-12-01 PROCEDURE — 6370000000 HC RX 637 (ALT 250 FOR IP): Performed by: INTERNAL MEDICINE

## 2019-12-01 PROCEDURE — 93010 ELECTROCARDIOGRAM REPORT: CPT | Performed by: INTERNAL MEDICINE

## 2019-12-01 PROCEDURE — 85730 THROMBOPLASTIN TIME PARTIAL: CPT

## 2019-12-01 PROCEDURE — 85610 PROTHROMBIN TIME: CPT

## 2019-12-01 PROCEDURE — 99222 1ST HOSP IP/OBS MODERATE 55: CPT | Performed by: ORTHOPAEDIC SURGERY

## 2019-12-01 PROCEDURE — 2580000003 HC RX 258: Performed by: INTERNAL MEDICINE

## 2019-12-01 PROCEDURE — 94760 N-INVAS EAR/PLS OXIMETRY 1: CPT

## 2019-12-01 PROCEDURE — 36415 COLL VENOUS BLD VENIPUNCTURE: CPT

## 2019-12-01 PROCEDURE — 86850 RBC ANTIBODY SCREEN: CPT

## 2019-12-01 PROCEDURE — 86901 BLOOD TYPING SEROLOGIC RH(D): CPT

## 2019-12-01 PROCEDURE — 1200000000 HC SEMI PRIVATE

## 2019-12-01 PROCEDURE — 85027 COMPLETE CBC AUTOMATED: CPT

## 2019-12-01 PROCEDURE — 36591 DRAW BLOOD OFF VENOUS DEVICE: CPT

## 2019-12-01 RX ORDER — MORPHINE SULFATE 2 MG/ML
2 INJECTION, SOLUTION INTRAMUSCULAR; INTRAVENOUS EVERY 4 HOURS PRN
Status: DISCONTINUED | OUTPATIENT
Start: 2019-12-01 | End: 2019-12-08

## 2019-12-01 RX ORDER — METOPROLOL SUCCINATE 25 MG/1
25 TABLET, EXTENDED RELEASE ORAL DAILY
Status: DISCONTINUED | OUTPATIENT
Start: 2019-12-01 | End: 2019-12-10 | Stop reason: HOSPADM

## 2019-12-01 RX ORDER — SODIUM CHLORIDE 0.9 % (FLUSH) 0.9 %
10 SYRINGE (ML) INJECTION EVERY 12 HOURS SCHEDULED
Status: DISCONTINUED | OUTPATIENT
Start: 2019-12-01 | End: 2019-12-10 | Stop reason: HOSPADM

## 2019-12-01 RX ORDER — POTASSIUM CHLORIDE 750 MG/1
10 CAPSULE, EXTENDED RELEASE ORAL DAILY
COMMUNITY

## 2019-12-01 RX ORDER — ASPIRIN 81 MG/1
81 TABLET ORAL DAILY
Status: DISCONTINUED | OUTPATIENT
Start: 2019-12-01 | End: 2019-12-10 | Stop reason: HOSPADM

## 2019-12-01 RX ORDER — FUROSEMIDE 10 MG/ML
40 INJECTION INTRAMUSCULAR; INTRAVENOUS ONCE
Status: COMPLETED | OUTPATIENT
Start: 2019-12-01 | End: 2019-12-01

## 2019-12-01 RX ORDER — ONDANSETRON 2 MG/ML
4 INJECTION INTRAMUSCULAR; INTRAVENOUS EVERY 6 HOURS PRN
Status: DISCONTINUED | OUTPATIENT
Start: 2019-12-01 | End: 2019-12-10 | Stop reason: HOSPADM

## 2019-12-01 RX ORDER — ATORVASTATIN CALCIUM 20 MG/1
20 TABLET, FILM COATED ORAL DAILY
Status: DISCONTINUED | OUTPATIENT
Start: 2019-12-01 | End: 2019-12-10 | Stop reason: HOSPADM

## 2019-12-01 RX ORDER — SPIRONOLACTONE 25 MG/1
25 TABLET ORAL DAILY
Status: DISCONTINUED | OUTPATIENT
Start: 2019-12-01 | End: 2019-12-09

## 2019-12-01 RX ORDER — SODIUM CHLORIDE 0.9 % (FLUSH) 0.9 %
10 SYRINGE (ML) INJECTION PRN
Status: DISCONTINUED | OUTPATIENT
Start: 2019-12-01 | End: 2019-12-10 | Stop reason: HOSPADM

## 2019-12-01 RX ORDER — OMEPRAZOLE 20 MG/1
20 CAPSULE, DELAYED RELEASE ORAL DAILY
COMMUNITY

## 2019-12-01 RX ORDER — BENZONATATE 100 MG/1
100 CAPSULE ORAL 3 TIMES DAILY PRN
Status: DISCONTINUED | OUTPATIENT
Start: 2019-12-01 | End: 2019-12-10 | Stop reason: HOSPADM

## 2019-12-01 RX ORDER — TORSEMIDE 20 MG/1
20 TABLET ORAL DAILY
Status: DISCONTINUED | OUTPATIENT
Start: 2019-12-01 | End: 2019-12-02

## 2019-12-01 RX ADMIN — TORSEMIDE 20 MG: 20 TABLET ORAL at 08:35

## 2019-12-01 RX ADMIN — MORPHINE SULFATE 2 MG: 2 INJECTION, SOLUTION INTRAMUSCULAR; INTRAVENOUS at 08:35

## 2019-12-01 RX ADMIN — SPIRONOLACTONE 25 MG: 25 TABLET ORAL at 08:35

## 2019-12-01 RX ADMIN — METOPROLOL SUCCINATE 25 MG: 25 TABLET, EXTENDED RELEASE ORAL at 08:35

## 2019-12-01 RX ADMIN — MORPHINE SULFATE 2 MG: 2 INJECTION, SOLUTION INTRAMUSCULAR; INTRAVENOUS at 21:49

## 2019-12-01 RX ADMIN — ATORVASTATIN CALCIUM 20 MG: 20 TABLET, FILM COATED ORAL at 08:35

## 2019-12-01 RX ADMIN — SODIUM CHLORIDE, PRESERVATIVE FREE 10 ML: 5 INJECTION INTRAVENOUS at 08:36

## 2019-12-01 RX ADMIN — ASPIRIN 81 MG: 81 TABLET, COATED ORAL at 08:35

## 2019-12-01 RX ADMIN — SODIUM CHLORIDE, PRESERVATIVE FREE 10 ML: 5 INJECTION INTRAVENOUS at 20:10

## 2019-12-01 RX ADMIN — FUROSEMIDE 40 MG: 10 INJECTION, SOLUTION INTRAMUSCULAR; INTRAVENOUS at 16:48

## 2019-12-01 ASSESSMENT — PAIN - FUNCTIONAL ASSESSMENT
PAIN_FUNCTIONAL_ASSESSMENT: PREVENTS OR INTERFERES SOME ACTIVE ACTIVITIES AND ADLS

## 2019-12-01 ASSESSMENT — PAIN DESCRIPTION - LOCATION
LOCATION: HIP

## 2019-12-01 ASSESSMENT — PAIN DESCRIPTION - ONSET
ONSET: ON-GOING

## 2019-12-01 ASSESSMENT — PAIN DESCRIPTION - PAIN TYPE
TYPE: ACUTE PAIN

## 2019-12-01 ASSESSMENT — PAIN SCALES - GENERAL
PAINLEVEL_OUTOF10: 0
PAINLEVEL_OUTOF10: 6
PAINLEVEL_OUTOF10: 4
PAINLEVEL_OUTOF10: 0
PAINLEVEL_OUTOF10: 2
PAINLEVEL_OUTOF10: 6

## 2019-12-01 ASSESSMENT — PAIN DESCRIPTION - PROGRESSION
CLINICAL_PROGRESSION: NOT CHANGED

## 2019-12-01 ASSESSMENT — PAIN DESCRIPTION - ORIENTATION
ORIENTATION: RIGHT

## 2019-12-01 ASSESSMENT — PAIN DESCRIPTION - DESCRIPTORS
DESCRIPTORS: ACHING

## 2019-12-01 ASSESSMENT — PAIN DESCRIPTION - FREQUENCY
FREQUENCY: INTERMITTENT

## 2019-12-02 ENCOUNTER — APPOINTMENT (OUTPATIENT)
Dept: CT IMAGING | Age: 74
DRG: 535 | End: 2019-12-02
Payer: MEDICARE

## 2019-12-02 LAB
D DIMER: 1877 NG/ML DDU (ref 0–229)
PRO-BNP: 6244 PG/ML (ref 0–124)
URINE CULTURE, ROUTINE: NORMAL

## 2019-12-02 PROCEDURE — 83880 ASSAY OF NATRIURETIC PEPTIDE: CPT

## 2019-12-02 PROCEDURE — 2580000003 HC RX 258: Performed by: INTERNAL MEDICINE

## 2019-12-02 PROCEDURE — 1200000000 HC SEMI PRIVATE

## 2019-12-02 PROCEDURE — 6360000004 HC RX CONTRAST MEDICATION: Performed by: INTERNAL MEDICINE

## 2019-12-02 PROCEDURE — 85379 FIBRIN DEGRADATION QUANT: CPT

## 2019-12-02 PROCEDURE — 6360000002 HC RX W HCPCS: Performed by: INTERNAL MEDICINE

## 2019-12-02 PROCEDURE — 6370000000 HC RX 637 (ALT 250 FOR IP): Performed by: INTERNAL MEDICINE

## 2019-12-02 PROCEDURE — 99233 SBSQ HOSP IP/OBS HIGH 50: CPT | Performed by: INTERNAL MEDICINE

## 2019-12-02 PROCEDURE — 99231 SBSQ HOSP IP/OBS SF/LOW 25: CPT | Performed by: ORTHOPAEDIC SURGERY

## 2019-12-02 PROCEDURE — 71260 CT THORAX DX C+: CPT

## 2019-12-02 PROCEDURE — 36415 COLL VENOUS BLD VENIPUNCTURE: CPT

## 2019-12-02 RX ORDER — FUROSEMIDE 10 MG/ML
60 INJECTION INTRAMUSCULAR; INTRAVENOUS 2 TIMES DAILY
Status: DISCONTINUED | OUTPATIENT
Start: 2019-12-02 | End: 2019-12-05

## 2019-12-02 RX ADMIN — MORPHINE SULFATE 2 MG: 2 INJECTION, SOLUTION INTRAMUSCULAR; INTRAVENOUS at 08:48

## 2019-12-02 RX ADMIN — SODIUM CHLORIDE, PRESERVATIVE FREE 10 ML: 5 INJECTION INTRAVENOUS at 21:15

## 2019-12-02 RX ADMIN — ASPIRIN 81 MG: 81 TABLET, COATED ORAL at 16:42

## 2019-12-02 RX ADMIN — ATORVASTATIN CALCIUM 20 MG: 20 TABLET, FILM COATED ORAL at 16:42

## 2019-12-02 RX ADMIN — MORPHINE SULFATE 2 MG: 2 INJECTION, SOLUTION INTRAMUSCULAR; INTRAVENOUS at 19:17

## 2019-12-02 RX ADMIN — FUROSEMIDE 60 MG: 10 INJECTION, SOLUTION INTRAMUSCULAR; INTRAVENOUS at 16:42

## 2019-12-02 RX ADMIN — IOPAMIDOL 75 ML: 755 INJECTION, SOLUTION INTRAVENOUS at 14:05

## 2019-12-02 RX ADMIN — SODIUM CHLORIDE, PRESERVATIVE FREE 10 ML: 5 INJECTION INTRAVENOUS at 08:48

## 2019-12-02 RX ADMIN — METOPROLOL SUCCINATE 25 MG: 25 TABLET, EXTENDED RELEASE ORAL at 16:43

## 2019-12-02 RX ADMIN — SPIRONOLACTONE 25 MG: 25 TABLET ORAL at 16:43

## 2019-12-02 RX ADMIN — MORPHINE SULFATE 2 MG: 2 INJECTION, SOLUTION INTRAMUSCULAR; INTRAVENOUS at 12:36

## 2019-12-02 ASSESSMENT — PAIN DESCRIPTION - FREQUENCY
FREQUENCY: CONTINUOUS

## 2019-12-02 ASSESSMENT — PAIN SCALES - GENERAL
PAINLEVEL_OUTOF10: 5
PAINLEVEL_OUTOF10: 0
PAINLEVEL_OUTOF10: 6
PAINLEVEL_OUTOF10: 0
PAINLEVEL_OUTOF10: 0
PAINLEVEL_OUTOF10: 4
PAINLEVEL_OUTOF10: 5

## 2019-12-02 ASSESSMENT — PAIN DESCRIPTION - ORIENTATION
ORIENTATION: RIGHT

## 2019-12-02 ASSESSMENT — PAIN DESCRIPTION - PROGRESSION
CLINICAL_PROGRESSION: GRADUALLY WORSENING
CLINICAL_PROGRESSION: GRADUALLY IMPROVING

## 2019-12-02 ASSESSMENT — PAIN DESCRIPTION - ONSET
ONSET: ON-GOING

## 2019-12-02 ASSESSMENT — PAIN DESCRIPTION - DESCRIPTORS
DESCRIPTORS: ACHING

## 2019-12-02 ASSESSMENT — PAIN DESCRIPTION - PAIN TYPE
TYPE: ACUTE PAIN

## 2019-12-02 ASSESSMENT — PAIN DESCRIPTION - LOCATION
LOCATION: HIP

## 2019-12-03 ENCOUNTER — ANESTHESIA (OUTPATIENT)
Dept: ORTHOPEDICS UNIT | Age: 74
DRG: 535 | End: 2019-12-03
Payer: MEDICARE

## 2019-12-03 ENCOUNTER — ANESTHESIA EVENT (OUTPATIENT)
Dept: ORTHOPEDICS UNIT | Age: 74
DRG: 535 | End: 2019-12-03
Payer: MEDICARE

## 2019-12-03 VITALS
DIASTOLIC BLOOD PRESSURE: 63 MMHG | RESPIRATION RATE: 7 BRPM | SYSTOLIC BLOOD PRESSURE: 107 MMHG | OXYGEN SATURATION: 100 %

## 2019-12-03 LAB
ANION GAP SERPL CALCULATED.3IONS-SCNC: 11 MMOL/L (ref 3–16)
BUN BLDV-MCNC: 52 MG/DL (ref 7–20)
CALCIUM SERPL-MCNC: 8.3 MG/DL (ref 8.3–10.6)
CHLORIDE BLD-SCNC: 97 MMOL/L (ref 99–110)
CO2: 29 MMOL/L (ref 21–32)
CREAT SERPL-MCNC: 1.1 MG/DL (ref 0.8–1.3)
GFR AFRICAN AMERICAN: >60
GFR NON-AFRICAN AMERICAN: >60
GLUCOSE BLD-MCNC: 122 MG/DL (ref 70–99)
HCT VFR BLD CALC: 35.9 % (ref 40.5–52.5)
HEMOGLOBIN: 11.4 G/DL (ref 13.5–17.5)
MCH RBC QN AUTO: 33.2 PG (ref 26–34)
MCHC RBC AUTO-ENTMCNC: 31.8 G/DL (ref 31–36)
MCV RBC AUTO: 104.3 FL (ref 80–100)
PDW BLD-RTO: 17.4 % (ref 12.4–15.4)
PLATELET # BLD: 113 K/UL (ref 135–450)
PMV BLD AUTO: 8.7 FL (ref 5–10.5)
POTASSIUM SERPL-SCNC: 4.8 MMOL/L (ref 3.5–5.1)
PROCALCITONIN: 0.18 NG/ML (ref 0–0.15)
RBC # BLD: 3.45 M/UL (ref 4.2–5.9)
SODIUM BLD-SCNC: 137 MMOL/L (ref 136–145)
WBC # BLD: 9.1 K/UL (ref 4–11)

## 2019-12-03 PROCEDURE — 3600000004 HC SURGERY LEVEL 4 BASE: Performed by: ORTHOPAEDIC SURGERY

## 2019-12-03 PROCEDURE — 85027 COMPLETE CBC AUTOMATED: CPT

## 2019-12-03 PROCEDURE — 6360000002 HC RX W HCPCS: Performed by: ORTHOPAEDIC SURGERY

## 2019-12-03 PROCEDURE — 2700000000 HC OXYGEN THERAPY PER DAY

## 2019-12-03 PROCEDURE — 36591 DRAW BLOOD OFF VENOUS DEVICE: CPT

## 2019-12-03 PROCEDURE — 6360000002 HC RX W HCPCS: Performed by: INTERNAL MEDICINE

## 2019-12-03 PROCEDURE — 99232 SBSQ HOSP IP/OBS MODERATE 35: CPT | Performed by: NURSE PRACTITIONER

## 2019-12-03 PROCEDURE — 7100000001 HC PACU RECOVERY - ADDTL 15 MIN: Performed by: ORTHOPAEDIC SURGERY

## 2019-12-03 PROCEDURE — 6370000000 HC RX 637 (ALT 250 FOR IP): Performed by: INTERNAL MEDICINE

## 2019-12-03 PROCEDURE — 84145 PROCALCITONIN (PCT): CPT

## 2019-12-03 PROCEDURE — 94761 N-INVAS EAR/PLS OXIMETRY MLT: CPT

## 2019-12-03 PROCEDURE — 0HJPXZZ INSPECTION OF SKIN, EXTERNAL APPROACH: ICD-10-PCS | Performed by: ORTHOPAEDIC SURGERY

## 2019-12-03 PROCEDURE — 6360000002 HC RX W HCPCS: Performed by: NURSE ANESTHETIST, CERTIFIED REGISTERED

## 2019-12-03 PROCEDURE — 3700000000 HC ANESTHESIA ATTENDED CARE

## 2019-12-03 PROCEDURE — 99232 SBSQ HOSP IP/OBS MODERATE 35: CPT | Performed by: ORTHOPAEDIC SURGERY

## 2019-12-03 PROCEDURE — 2500000003 HC RX 250 WO HCPCS

## 2019-12-03 PROCEDURE — 2580000003 HC RX 258: Performed by: INTERNAL MEDICINE

## 2019-12-03 PROCEDURE — 7100000000 HC PACU RECOVERY - FIRST 15 MIN: Performed by: ORTHOPAEDIC SURGERY

## 2019-12-03 PROCEDURE — 2500000003 HC RX 250 WO HCPCS: Performed by: ORTHOPAEDIC SURGERY

## 2019-12-03 PROCEDURE — 1200000000 HC SEMI PRIVATE

## 2019-12-03 PROCEDURE — 36415 COLL VENOUS BLD VENIPUNCTURE: CPT

## 2019-12-03 PROCEDURE — 3600000014 HC SURGERY LEVEL 4 ADDTL 15MIN: Performed by: ORTHOPAEDIC SURGERY

## 2019-12-03 PROCEDURE — 2709999900 HC NON-CHARGEABLE SUPPLY: Performed by: ORTHOPAEDIC SURGERY

## 2019-12-03 PROCEDURE — 3700000001 HC ADD 15 MINUTES (ANESTHESIA)

## 2019-12-03 PROCEDURE — 2580000003 HC RX 258: Performed by: NURSE ANESTHETIST, CERTIFIED REGISTERED

## 2019-12-03 PROCEDURE — 80048 BASIC METABOLIC PNL TOTAL CA: CPT

## 2019-12-03 PROCEDURE — 2500000003 HC RX 250 WO HCPCS: Performed by: NURSE ANESTHETIST, CERTIFIED REGISTERED

## 2019-12-03 PROCEDURE — 6360000002 HC RX W HCPCS: Performed by: NURSE PRACTITIONER

## 2019-12-03 PROCEDURE — 2580000003 HC RX 258: Performed by: ORTHOPAEDIC SURGERY

## 2019-12-03 RX ORDER — ONDANSETRON 2 MG/ML
4 INJECTION INTRAMUSCULAR; INTRAVENOUS
Status: DISCONTINUED | OUTPATIENT
Start: 2019-12-03 | End: 2019-12-03 | Stop reason: HOSPADM

## 2019-12-03 RX ORDER — FENTANYL CITRATE 50 UG/ML
INJECTION, SOLUTION INTRAMUSCULAR; INTRAVENOUS PRN
Status: DISCONTINUED | OUTPATIENT
Start: 2019-12-03 | End: 2019-12-03 | Stop reason: SDUPTHER

## 2019-12-03 RX ORDER — FLUCONAZOLE 100 MG/1
200 TABLET ORAL DAILY
Status: DISCONTINUED | OUTPATIENT
Start: 2019-12-03 | End: 2019-12-04

## 2019-12-03 RX ORDER — NALOXONE HYDROCHLORIDE 0.4 MG/ML
INJECTION, SOLUTION INTRAMUSCULAR; INTRAVENOUS; SUBCUTANEOUS PRN
Status: DISCONTINUED | OUTPATIENT
Start: 2019-12-03 | End: 2019-12-03 | Stop reason: SDUPTHER

## 2019-12-03 RX ORDER — OXYCODONE HYDROCHLORIDE AND ACETAMINOPHEN 5; 325 MG/1; MG/1
2 TABLET ORAL PRN
Status: DISCONTINUED | OUTPATIENT
Start: 2019-12-03 | End: 2019-12-03 | Stop reason: HOSPADM

## 2019-12-03 RX ORDER — HEPARIN SODIUM 5000 [USP'U]/ML
5000 INJECTION, SOLUTION INTRAVENOUS; SUBCUTANEOUS EVERY 8 HOURS SCHEDULED
Status: COMPLETED | OUTPATIENT
Start: 2019-12-03 | End: 2019-12-07

## 2019-12-03 RX ORDER — OXYCODONE HYDROCHLORIDE AND ACETAMINOPHEN 5; 325 MG/1; MG/1
1 TABLET ORAL PRN
Status: DISCONTINUED | OUTPATIENT
Start: 2019-12-03 | End: 2019-12-03 | Stop reason: HOSPADM

## 2019-12-03 RX ORDER — MORPHINE SULFATE 2 MG/ML
1 INJECTION, SOLUTION INTRAMUSCULAR; INTRAVENOUS EVERY 5 MIN PRN
Status: DISCONTINUED | OUTPATIENT
Start: 2019-12-03 | End: 2019-12-03 | Stop reason: HOSPADM

## 2019-12-03 RX ORDER — SODIUM CHLORIDE 9 MG/ML
INJECTION, SOLUTION INTRAVENOUS CONTINUOUS PRN
Status: DISCONTINUED | OUTPATIENT
Start: 2019-12-03 | End: 2019-12-03 | Stop reason: SDUPTHER

## 2019-12-03 RX ORDER — LABETALOL HYDROCHLORIDE 5 MG/ML
5 INJECTION, SOLUTION INTRAVENOUS EVERY 10 MIN PRN
Status: DISCONTINUED | OUTPATIENT
Start: 2019-12-03 | End: 2019-12-03 | Stop reason: HOSPADM

## 2019-12-03 RX ORDER — MEPERIDINE HYDROCHLORIDE 25 MG/ML
12.5 INJECTION INTRAMUSCULAR; INTRAVENOUS; SUBCUTANEOUS EVERY 5 MIN PRN
Status: DISCONTINUED | OUTPATIENT
Start: 2019-12-03 | End: 2019-12-03 | Stop reason: HOSPADM

## 2019-12-03 RX ORDER — MORPHINE SULFATE 2 MG/ML
2 INJECTION, SOLUTION INTRAMUSCULAR; INTRAVENOUS EVERY 5 MIN PRN
Status: DISCONTINUED | OUTPATIENT
Start: 2019-12-03 | End: 2019-12-03 | Stop reason: HOSPADM

## 2019-12-03 RX ORDER — KETAMINE HCL IN NACL, ISO-OSM 100MG/10ML
SYRINGE (ML) INJECTION PRN
Status: DISCONTINUED | OUTPATIENT
Start: 2019-12-03 | End: 2019-12-03 | Stop reason: SDUPTHER

## 2019-12-03 RX ORDER — LIDOCAINE HYDROCHLORIDE 20 MG/ML
INJECTION, SOLUTION EPIDURAL; INFILTRATION; INTRACAUDAL; PERINEURAL PRN
Status: DISCONTINUED | OUTPATIENT
Start: 2019-12-03 | End: 2019-12-03 | Stop reason: SDUPTHER

## 2019-12-03 RX ORDER — SUCCINYLCHOLINE/SOD CL,ISO/PF 200MG/10ML
SYRINGE (ML) INTRAVENOUS PRN
Status: DISCONTINUED | OUTPATIENT
Start: 2019-12-03 | End: 2019-12-03 | Stop reason: SDUPTHER

## 2019-12-03 RX ORDER — HYDRALAZINE HYDROCHLORIDE 20 MG/ML
5 INJECTION INTRAMUSCULAR; INTRAVENOUS
Status: DISCONTINUED | OUTPATIENT
Start: 2019-12-03 | End: 2019-12-03 | Stop reason: HOSPADM

## 2019-12-03 RX ORDER — PROPOFOL 10 MG/ML
INJECTION, EMULSION INTRAVENOUS PRN
Status: DISCONTINUED | OUTPATIENT
Start: 2019-12-03 | End: 2019-12-03 | Stop reason: SDUPTHER

## 2019-12-03 RX ADMIN — MORPHINE SULFATE 2 MG: 2 INJECTION, SOLUTION INTRAMUSCULAR; INTRAVENOUS at 00:40

## 2019-12-03 RX ADMIN — SODIUM CHLORIDE: 9 INJECTION, SOLUTION INTRAVENOUS at 16:36

## 2019-12-03 RX ADMIN — MORPHINE SULFATE 2 MG: 2 INJECTION, SOLUTION INTRAMUSCULAR; INTRAVENOUS at 12:55

## 2019-12-03 RX ADMIN — PROPOFOL 80 MG: 10 INJECTION, EMULSION INTRAVENOUS at 16:41

## 2019-12-03 RX ADMIN — FENTANYL CITRATE 50 MCG: 50 INJECTION INTRAMUSCULAR; INTRAVENOUS at 16:41

## 2019-12-03 RX ADMIN — NALOXONE HYDROCHLORIDE 0.04 MG: 0.4 INJECTION, SOLUTION INTRAMUSCULAR; INTRAVENOUS; SUBCUTANEOUS at 17:06

## 2019-12-03 RX ADMIN — FLUCONAZOLE 200 MG: 100 TABLET ORAL at 18:57

## 2019-12-03 RX ADMIN — SODIUM CHLORIDE, PRESERVATIVE FREE 10 ML: 5 INJECTION INTRAVENOUS at 11:07

## 2019-12-03 RX ADMIN — HEPARIN SODIUM 5000 UNITS: 5000 INJECTION INTRAVENOUS; SUBCUTANEOUS at 21:30

## 2019-12-03 RX ADMIN — MORPHINE SULFATE 2 MG: 2 INJECTION, SOLUTION INTRAMUSCULAR; INTRAVENOUS at 19:53

## 2019-12-03 RX ADMIN — Medication 20 MG: at 16:41

## 2019-12-03 RX ADMIN — METOPROLOL SUCCINATE 25 MG: 25 TABLET, EXTENDED RELEASE ORAL at 11:05

## 2019-12-03 RX ADMIN — NALOXONE HYDROCHLORIDE 0.04 MG: 0.4 INJECTION, SOLUTION INTRAMUSCULAR; INTRAVENOUS; SUBCUTANEOUS at 17:03

## 2019-12-03 RX ADMIN — MORPHINE SULFATE 2 MG: 2 INJECTION, SOLUTION INTRAMUSCULAR; INTRAVENOUS at 08:20

## 2019-12-03 RX ADMIN — FUROSEMIDE 60 MG: 10 INJECTION, SOLUTION INTRAMUSCULAR; INTRAVENOUS at 18:57

## 2019-12-03 RX ADMIN — MICONAZOLE NITRATE: 20 POWDER TOPICAL at 21:36

## 2019-12-03 RX ADMIN — Medication 100 MG: at 16:41

## 2019-12-03 RX ADMIN — SODIUM CHLORIDE, PRESERVATIVE FREE 10 ML: 5 INJECTION INTRAVENOUS at 19:47

## 2019-12-03 RX ADMIN — LIDOCAINE HYDROCHLORIDE 5 ML: 20 INJECTION, SOLUTION EPIDURAL; INFILTRATION; INTRACAUDAL; PERINEURAL at 16:41

## 2019-12-03 RX ADMIN — Medication 2 G: at 16:39

## 2019-12-03 RX ADMIN — SPIRONOLACTONE 25 MG: 25 TABLET ORAL at 11:05

## 2019-12-03 ASSESSMENT — PAIN DESCRIPTION - ONSET
ONSET: ON-GOING

## 2019-12-03 ASSESSMENT — PAIN DESCRIPTION - FREQUENCY
FREQUENCY: CONTINUOUS

## 2019-12-03 ASSESSMENT — PULMONARY FUNCTION TESTS
PIF_VALUE: 23
PIF_VALUE: 13
PIF_VALUE: 17
PIF_VALUE: 48
PIF_VALUE: 17
PIF_VALUE: 23
PIF_VALUE: 27
PIF_VALUE: 23
PIF_VALUE: 21
PIF_VALUE: 17
PIF_VALUE: 17
PIF_VALUE: 1
PIF_VALUE: 24
PIF_VALUE: 27
PIF_VALUE: 1
PIF_VALUE: 25
PIF_VALUE: 34
PIF_VALUE: 30
PIF_VALUE: 35
PIF_VALUE: 24
PIF_VALUE: 31
PIF_VALUE: 1
PIF_VALUE: 23
PIF_VALUE: 17
PIF_VALUE: 19
PIF_VALUE: 1
PIF_VALUE: 1
PIF_VALUE: 22
PIF_VALUE: 1
PIF_VALUE: 1
PIF_VALUE: 22
PIF_VALUE: 32
PIF_VALUE: 2
PIF_VALUE: 23

## 2019-12-03 ASSESSMENT — PAIN DESCRIPTION - DESCRIPTORS
DESCRIPTORS: CONSTANT;DISCOMFORT
DESCRIPTORS: ACHING
DESCRIPTORS: ACHING
DESCRIPTORS: CONSTANT;DISCOMFORT

## 2019-12-03 ASSESSMENT — PAIN DESCRIPTION - LOCATION
LOCATION: HIP

## 2019-12-03 ASSESSMENT — PAIN DESCRIPTION - PAIN TYPE
TYPE: ACUTE PAIN

## 2019-12-03 ASSESSMENT — PAIN - FUNCTIONAL ASSESSMENT
PAIN_FUNCTIONAL_ASSESSMENT: PREVENTS OR INTERFERES SOME ACTIVE ACTIVITIES AND ADLS
PAIN_FUNCTIONAL_ASSESSMENT: ACTIVITIES ARE NOT PREVENTED
PAIN_FUNCTIONAL_ASSESSMENT: PREVENTS OR INTERFERES SOME ACTIVE ACTIVITIES AND ADLS
PAIN_FUNCTIONAL_ASSESSMENT: ACTIVITIES ARE NOT PREVENTED
PAIN_FUNCTIONAL_ASSESSMENT: ACTIVITIES ARE NOT PREVENTED
PAIN_FUNCTIONAL_ASSESSMENT: FACES
PAIN_FUNCTIONAL_ASSESSMENT: ACTIVITIES ARE NOT PREVENTED

## 2019-12-03 ASSESSMENT — PAIN DESCRIPTION - PROGRESSION
CLINICAL_PROGRESSION: NOT CHANGED
CLINICAL_PROGRESSION: NOT CHANGED
CLINICAL_PROGRESSION: GRADUALLY IMPROVING
CLINICAL_PROGRESSION: NOT CHANGED
CLINICAL_PROGRESSION: GRADUALLY IMPROVING
CLINICAL_PROGRESSION: NOT CHANGED

## 2019-12-03 ASSESSMENT — PAIN DESCRIPTION - ORIENTATION
ORIENTATION: RIGHT

## 2019-12-03 ASSESSMENT — PAIN SCALES - GENERAL
PAINLEVEL_OUTOF10: 6
PAINLEVEL_OUTOF10: 6
PAINLEVEL_OUTOF10: 5
PAINLEVEL_OUTOF10: 3
PAINLEVEL_OUTOF10: 6
PAINLEVEL_OUTOF10: 6
PAINLEVEL_OUTOF10: 10

## 2019-12-03 ASSESSMENT — PAIN SCALES - WONG BAKER
WONGBAKER_NUMERICALRESPONSE: 6
WONGBAKER_NUMERICALRESPONSE: 4

## 2019-12-03 ASSESSMENT — ENCOUNTER SYMPTOMS: SHORTNESS OF BREATH: 0

## 2019-12-04 LAB
ANION GAP SERPL CALCULATED.3IONS-SCNC: 10 MMOL/L (ref 3–16)
BUN BLDV-MCNC: 53 MG/DL (ref 7–20)
CALCIUM SERPL-MCNC: 8 MG/DL (ref 8.3–10.6)
CHLORIDE BLD-SCNC: 96 MMOL/L (ref 99–110)
CO2: 30 MMOL/L (ref 21–32)
CREAT SERPL-MCNC: 0.8 MG/DL (ref 0.8–1.3)
GFR AFRICAN AMERICAN: >60
GFR NON-AFRICAN AMERICAN: >60
GLUCOSE BLD-MCNC: 111 MG/DL (ref 70–99)
HCT VFR BLD CALC: 33.8 % (ref 40.5–52.5)
HEMOGLOBIN: 10.9 G/DL (ref 13.5–17.5)
MCH RBC QN AUTO: 33.6 PG (ref 26–34)
MCHC RBC AUTO-ENTMCNC: 32.3 G/DL (ref 31–36)
MCV RBC AUTO: 103.8 FL (ref 80–100)
PDW BLD-RTO: 17.4 % (ref 12.4–15.4)
PLATELET # BLD: 115 K/UL (ref 135–450)
PMV BLD AUTO: 8.8 FL (ref 5–10.5)
POTASSIUM SERPL-SCNC: 4.4 MMOL/L (ref 3.5–5.1)
PRO-BNP: 4609 PG/ML (ref 0–124)
RBC # BLD: 3.26 M/UL (ref 4.2–5.9)
SODIUM BLD-SCNC: 136 MMOL/L (ref 136–145)
WBC # BLD: 9.4 K/UL (ref 4–11)

## 2019-12-04 PROCEDURE — 6360000002 HC RX W HCPCS: Performed by: ORTHOPAEDIC SURGERY

## 2019-12-04 PROCEDURE — 94761 N-INVAS EAR/PLS OXIMETRY MLT: CPT

## 2019-12-04 PROCEDURE — 99232 SBSQ HOSP IP/OBS MODERATE 35: CPT | Performed by: NURSE PRACTITIONER

## 2019-12-04 PROCEDURE — 83880 ASSAY OF NATRIURETIC PEPTIDE: CPT

## 2019-12-04 PROCEDURE — 2700000000 HC OXYGEN THERAPY PER DAY

## 2019-12-04 PROCEDURE — 80048 BASIC METABOLIC PNL TOTAL CA: CPT

## 2019-12-04 PROCEDURE — 2580000003 HC RX 258: Performed by: ORTHOPAEDIC SURGERY

## 2019-12-04 PROCEDURE — 1200000000 HC SEMI PRIVATE

## 2019-12-04 PROCEDURE — 36591 DRAW BLOOD OFF VENOUS DEVICE: CPT

## 2019-12-04 PROCEDURE — 2580000003 HC RX 258: Performed by: HOSPITALIST

## 2019-12-04 PROCEDURE — 36415 COLL VENOUS BLD VENIPUNCTURE: CPT

## 2019-12-04 PROCEDURE — 87641 MR-STAPH DNA AMP PROBE: CPT

## 2019-12-04 PROCEDURE — 99221 1ST HOSP IP/OBS SF/LOW 40: CPT | Performed by: INTERNAL MEDICINE

## 2019-12-04 PROCEDURE — 85027 COMPLETE CBC AUTOMATED: CPT

## 2019-12-04 PROCEDURE — 6370000000 HC RX 637 (ALT 250 FOR IP): Performed by: INTERNAL MEDICINE

## 2019-12-04 PROCEDURE — 6370000000 HC RX 637 (ALT 250 FOR IP): Performed by: ORTHOPAEDIC SURGERY

## 2019-12-04 PROCEDURE — 6360000002 HC RX W HCPCS: Performed by: HOSPITALIST

## 2019-12-04 RX ORDER — FLUCONAZOLE 2 MG/ML
200 INJECTION, SOLUTION INTRAVENOUS EVERY 24 HOURS
Status: DISCONTINUED | OUTPATIENT
Start: 2019-12-05 | End: 2019-12-06

## 2019-12-04 RX ORDER — LEVOFLOXACIN 5 MG/ML
500 INJECTION, SOLUTION INTRAVENOUS EVERY 24 HOURS
Status: DISCONTINUED | OUTPATIENT
Start: 2019-12-04 | End: 2019-12-05

## 2019-12-04 RX ADMIN — SPIRONOLACTONE 25 MG: 25 TABLET ORAL at 08:21

## 2019-12-04 RX ADMIN — ASPIRIN 81 MG: 81 TABLET, COATED ORAL at 08:21

## 2019-12-04 RX ADMIN — MORPHINE SULFATE 2 MG: 2 INJECTION, SOLUTION INTRAMUSCULAR; INTRAVENOUS at 15:10

## 2019-12-04 RX ADMIN — METOPROLOL SUCCINATE 25 MG: 25 TABLET, EXTENDED RELEASE ORAL at 08:21

## 2019-12-04 RX ADMIN — FUROSEMIDE 60 MG: 10 INJECTION, SOLUTION INTRAMUSCULAR; INTRAVENOUS at 08:20

## 2019-12-04 RX ADMIN — MICONAZOLE NITRATE: 20 POWDER TOPICAL at 12:24

## 2019-12-04 RX ADMIN — ATORVASTATIN CALCIUM 20 MG: 20 TABLET, FILM COATED ORAL at 08:21

## 2019-12-04 RX ADMIN — MORPHINE SULFATE 2 MG: 2 INJECTION, SOLUTION INTRAMUSCULAR; INTRAVENOUS at 04:37

## 2019-12-04 RX ADMIN — FLUCONAZOLE 200 MG: 100 TABLET ORAL at 08:20

## 2019-12-04 RX ADMIN — MICONAZOLE NITRATE: 20 POWDER TOPICAL at 21:40

## 2019-12-04 RX ADMIN — HEPARIN SODIUM 5000 UNITS: 5000 INJECTION INTRAVENOUS; SUBCUTANEOUS at 05:30

## 2019-12-04 RX ADMIN — SODIUM CHLORIDE, PRESERVATIVE FREE 10 ML: 5 INJECTION INTRAVENOUS at 19:46

## 2019-12-04 RX ADMIN — FUROSEMIDE 60 MG: 10 INJECTION, SOLUTION INTRAMUSCULAR; INTRAVENOUS at 18:16

## 2019-12-04 RX ADMIN — HEPARIN SODIUM 5000 UNITS: 5000 INJECTION INTRAVENOUS; SUBCUTANEOUS at 21:24

## 2019-12-04 RX ADMIN — MORPHINE SULFATE 2 MG: 2 INJECTION, SOLUTION INTRAMUSCULAR; INTRAVENOUS at 21:48

## 2019-12-04 RX ADMIN — LEVOFLOXACIN 500 MG: 5 INJECTION, SOLUTION INTRAVENOUS at 19:46

## 2019-12-04 RX ADMIN — MORPHINE SULFATE 2 MG: 2 INJECTION, SOLUTION INTRAMUSCULAR; INTRAVENOUS at 08:19

## 2019-12-04 RX ADMIN — HEPARIN SODIUM 5000 UNITS: 5000 INJECTION INTRAVENOUS; SUBCUTANEOUS at 15:10

## 2019-12-04 RX ADMIN — CEFTRIAXONE 1 G: 1 INJECTION, POWDER, FOR SOLUTION INTRAMUSCULAR; INTRAVENOUS at 08:19

## 2019-12-04 ASSESSMENT — PAIN SCALES - GENERAL
PAINLEVEL_OUTOF10: 4
PAINLEVEL_OUTOF10: 0
PAINLEVEL_OUTOF10: 5
PAINLEVEL_OUTOF10: 5
PAINLEVEL_OUTOF10: 7
PAINLEVEL_OUTOF10: 0
PAINLEVEL_OUTOF10: 6
PAINLEVEL_OUTOF10: 7
PAINLEVEL_OUTOF10: 7

## 2019-12-04 ASSESSMENT — PAIN DESCRIPTION - ONSET
ONSET: ON-GOING

## 2019-12-04 ASSESSMENT — PAIN SCALES - WONG BAKER
WONGBAKER_NUMERICALRESPONSE: 4
WONGBAKER_NUMERICALRESPONSE: 6

## 2019-12-04 ASSESSMENT — PAIN - FUNCTIONAL ASSESSMENT
PAIN_FUNCTIONAL_ASSESSMENT: PREVENTS OR INTERFERES WITH ALL ACTIVE AND SOME PASSIVE ACTIVITIES
PAIN_FUNCTIONAL_ASSESSMENT: ACTIVITIES ARE NOT PREVENTED
PAIN_FUNCTIONAL_ASSESSMENT: ACTIVITIES ARE NOT PREVENTED

## 2019-12-04 ASSESSMENT — PAIN DESCRIPTION - DESCRIPTORS
DESCRIPTORS: CONSTANT;DISCOMFORT
DESCRIPTORS: CONSTANT;DISCOMFORT
DESCRIPTORS: THROBBING

## 2019-12-04 ASSESSMENT — PAIN DESCRIPTION - LOCATION
LOCATION: HIP

## 2019-12-04 ASSESSMENT — PAIN DESCRIPTION - PROGRESSION
CLINICAL_PROGRESSION: NOT CHANGED

## 2019-12-04 ASSESSMENT — PAIN DESCRIPTION - PAIN TYPE
TYPE: ACUTE PAIN

## 2019-12-04 ASSESSMENT — PAIN DESCRIPTION - ORIENTATION
ORIENTATION: RIGHT

## 2019-12-04 ASSESSMENT — PAIN DESCRIPTION - FREQUENCY
FREQUENCY: INTERMITTENT
FREQUENCY: CONTINUOUS
FREQUENCY: CONTINUOUS

## 2019-12-05 ENCOUNTER — APPOINTMENT (OUTPATIENT)
Dept: ULTRASOUND IMAGING | Age: 74
DRG: 535 | End: 2019-12-05
Payer: MEDICARE

## 2019-12-05 LAB
BASE EXCESS VENOUS: 6.6 MMOL/L
CARBOXYHEMOGLOBIN: 2.5 %
HCO3 VENOUS: 36 MMOL/L (ref 23–29)
HIV AG/AB: NORMAL
HIV ANTIGEN: NORMAL
HIV-1 ANTIBODY: NORMAL
HIV-2 AB: NORMAL
METHEMOGLOBIN VENOUS: 0.4 %
MRSA SCREEN RT-PCR: ABNORMAL
O2 CONTENT, VEN: 16 ML/DL
O2 SAT, VEN: 85 %
O2 THERAPY: ABNORMAL
ORGANISM: ABNORMAL
PCO2, VEN: 77.1 MMHG (ref 40–50)
PH VENOUS: 7.29 (ref 7.35–7.45)
PO2, VEN: 58 MMHG
TCO2 CALC VENOUS: 38 MMOL/L

## 2019-12-05 PROCEDURE — 87070 CULTURE OTHR SPECIMN AEROBIC: CPT

## 2019-12-05 PROCEDURE — 99223 1ST HOSP IP/OBS HIGH 75: CPT | Performed by: INTERNAL MEDICINE

## 2019-12-05 PROCEDURE — 6360000002 HC RX W HCPCS: Performed by: NURSE PRACTITIONER

## 2019-12-05 PROCEDURE — 6370000000 HC RX 637 (ALT 250 FOR IP): Performed by: INTERNAL MEDICINE

## 2019-12-05 PROCEDURE — 6360000002 HC RX W HCPCS: Performed by: INTERNAL MEDICINE

## 2019-12-05 PROCEDURE — 87449 NOS EACH ORGANISM AG IA: CPT

## 2019-12-05 PROCEDURE — 6360000002 HC RX W HCPCS: Performed by: ORTHOPAEDIC SURGERY

## 2019-12-05 PROCEDURE — 76705 ECHO EXAM OF ABDOMEN: CPT

## 2019-12-05 PROCEDURE — 82803 BLOOD GASES ANY COMBINATION: CPT

## 2019-12-05 PROCEDURE — 1200000000 HC SEMI PRIVATE

## 2019-12-05 PROCEDURE — 6370000000 HC RX 637 (ALT 250 FOR IP): Performed by: NURSE PRACTITIONER

## 2019-12-05 PROCEDURE — 94640 AIRWAY INHALATION TREATMENT: CPT

## 2019-12-05 PROCEDURE — 87205 SMEAR GRAM STAIN: CPT

## 2019-12-05 PROCEDURE — 87390 HIV-1 AG IA: CPT

## 2019-12-05 PROCEDURE — 36591 DRAW BLOOD OFF VENOUS DEVICE: CPT

## 2019-12-05 PROCEDURE — 2580000003 HC RX 258: Performed by: ORTHOPAEDIC SURGERY

## 2019-12-05 PROCEDURE — 99233 SBSQ HOSP IP/OBS HIGH 50: CPT | Performed by: INTERNAL MEDICINE

## 2019-12-05 PROCEDURE — 99231 SBSQ HOSP IP/OBS SF/LOW 25: CPT | Performed by: ORTHOPAEDIC SURGERY

## 2019-12-05 PROCEDURE — 2700000000 HC OXYGEN THERAPY PER DAY

## 2019-12-05 PROCEDURE — 6370000000 HC RX 637 (ALT 250 FOR IP): Performed by: ORTHOPAEDIC SURGERY

## 2019-12-05 PROCEDURE — 86701 HIV-1ANTIBODY: CPT

## 2019-12-05 PROCEDURE — 86702 HIV-2 ANTIBODY: CPT

## 2019-12-05 PROCEDURE — 99232 SBSQ HOSP IP/OBS MODERATE 35: CPT | Performed by: NURSE PRACTITIONER

## 2019-12-05 PROCEDURE — 94760 N-INVAS EAR/PLS OXIMETRY 1: CPT

## 2019-12-05 RX ORDER — PREDNISONE 20 MG/1
40 TABLET ORAL DAILY
Status: COMPLETED | OUTPATIENT
Start: 2019-12-05 | End: 2019-12-09

## 2019-12-05 RX ORDER — ACETAZOLAMIDE 500 MG/5ML
500 INJECTION, POWDER, LYOPHILIZED, FOR SOLUTION INTRAVENOUS EVERY 8 HOURS SCHEDULED
Status: COMPLETED | OUTPATIENT
Start: 2019-12-05 | End: 2019-12-06

## 2019-12-05 RX ORDER — ALBUTEROL SULFATE 2.5 MG/3ML
2.5 SOLUTION RESPIRATORY (INHALATION)
Status: DISCONTINUED | OUTPATIENT
Start: 2019-12-05 | End: 2019-12-10 | Stop reason: HOSPADM

## 2019-12-05 RX ORDER — IPRATROPIUM BROMIDE AND ALBUTEROL SULFATE 2.5; .5 MG/3ML; MG/3ML
1 SOLUTION RESPIRATORY (INHALATION)
Status: DISCONTINUED | OUTPATIENT
Start: 2019-12-05 | End: 2019-12-10 | Stop reason: HOSPADM

## 2019-12-05 RX ADMIN — PREDNISONE 40 MG: 20 TABLET ORAL at 15:06

## 2019-12-05 RX ADMIN — METOPROLOL SUCCINATE 25 MG: 25 TABLET, EXTENDED RELEASE ORAL at 09:24

## 2019-12-05 RX ADMIN — SPIRONOLACTONE 25 MG: 25 TABLET ORAL at 09:24

## 2019-12-05 RX ADMIN — HEPARIN SODIUM 5000 UNITS: 5000 INJECTION INTRAVENOUS; SUBCUTANEOUS at 05:54

## 2019-12-05 RX ADMIN — LEVOFLOXACIN 750 MG: 500 TABLET, FILM COATED ORAL at 16:43

## 2019-12-05 RX ADMIN — ACETAZOLAMIDE 500 MG: 500 INJECTION, POWDER, LYOPHILIZED, FOR SOLUTION INTRAVENOUS at 20:00

## 2019-12-05 RX ADMIN — MORPHINE SULFATE 2 MG: 2 INJECTION, SOLUTION INTRAMUSCULAR; INTRAVENOUS at 15:22

## 2019-12-05 RX ADMIN — SODIUM CHLORIDE, PRESERVATIVE FREE 10 ML: 5 INJECTION INTRAVENOUS at 19:19

## 2019-12-05 RX ADMIN — MORPHINE SULFATE 2 MG: 2 INJECTION, SOLUTION INTRAMUSCULAR; INTRAVENOUS at 09:45

## 2019-12-05 RX ADMIN — FUROSEMIDE 60 MG: 10 INJECTION, SOLUTION INTRAMUSCULAR; INTRAVENOUS at 09:24

## 2019-12-05 RX ADMIN — FLUCONAZOLE 200 MG: 200 INJECTION, SOLUTION INTRAVENOUS at 09:24

## 2019-12-05 RX ADMIN — IPRATROPIUM BROMIDE AND ALBUTEROL SULFATE 1 AMPULE: 2.5; .5 SOLUTION RESPIRATORY (INHALATION) at 12:11

## 2019-12-05 RX ADMIN — IPRATROPIUM BROMIDE AND ALBUTEROL SULFATE 1 AMPULE: 2.5; .5 SOLUTION RESPIRATORY (INHALATION) at 20:26

## 2019-12-05 RX ADMIN — MICONAZOLE NITRATE: 20 POWDER TOPICAL at 22:12

## 2019-12-05 RX ADMIN — ALBUTEROL SULFATE 2.5 MG: 2.5 SOLUTION RESPIRATORY (INHALATION) at 10:04

## 2019-12-05 RX ADMIN — MORPHINE SULFATE 2 MG: 2 INJECTION, SOLUTION INTRAMUSCULAR; INTRAVENOUS at 03:18

## 2019-12-05 RX ADMIN — SODIUM CHLORIDE, PRESERVATIVE FREE 10 ML: 5 INJECTION INTRAVENOUS at 09:26

## 2019-12-05 RX ADMIN — HEPARIN SODIUM 5000 UNITS: 5000 INJECTION INTRAVENOUS; SUBCUTANEOUS at 22:12

## 2019-12-05 RX ADMIN — MICONAZOLE NITRATE: 20 POWDER TOPICAL at 09:26

## 2019-12-05 RX ADMIN — MORPHINE SULFATE 2 MG: 2 INJECTION, SOLUTION INTRAMUSCULAR; INTRAVENOUS at 20:00

## 2019-12-05 RX ADMIN — IPRATROPIUM BROMIDE AND ALBUTEROL SULFATE 1 AMPULE: 2.5; .5 SOLUTION RESPIRATORY (INHALATION) at 17:16

## 2019-12-05 RX ADMIN — ACETAZOLAMIDE 500 MG: 500 INJECTION, POWDER, LYOPHILIZED, FOR SOLUTION INTRAVENOUS at 15:06

## 2019-12-05 RX ADMIN — ATORVASTATIN CALCIUM 20 MG: 20 TABLET, FILM COATED ORAL at 09:23

## 2019-12-05 RX ADMIN — HEPARIN SODIUM 5000 UNITS: 5000 INJECTION INTRAVENOUS; SUBCUTANEOUS at 15:05

## 2019-12-05 RX ADMIN — ASPIRIN 81 MG: 81 TABLET, COATED ORAL at 09:23

## 2019-12-05 ASSESSMENT — PAIN SCALES - GENERAL
PAINLEVEL_OUTOF10: 4
PAINLEVEL_OUTOF10: 6
PAINLEVEL_OUTOF10: 5
PAINLEVEL_OUTOF10: 5
PAINLEVEL_OUTOF10: 6
PAINLEVEL_OUTOF10: 0
PAINLEVEL_OUTOF10: 6
PAINLEVEL_OUTOF10: 0

## 2019-12-05 ASSESSMENT — PAIN DESCRIPTION - ONSET
ONSET: ON-GOING

## 2019-12-05 ASSESSMENT — PAIN DESCRIPTION - PROGRESSION
CLINICAL_PROGRESSION: NOT CHANGED

## 2019-12-05 ASSESSMENT — PAIN DESCRIPTION - DESCRIPTORS
DESCRIPTORS: THROBBING;ACHING
DESCRIPTORS: THROBBING
DESCRIPTORS: ACHING;THROBBING
DESCRIPTORS: ACHING

## 2019-12-05 ASSESSMENT — PAIN - FUNCTIONAL ASSESSMENT
PAIN_FUNCTIONAL_ASSESSMENT: PREVENTS OR INTERFERES SOME ACTIVE ACTIVITIES AND ADLS

## 2019-12-05 ASSESSMENT — PAIN DESCRIPTION - PAIN TYPE
TYPE: ACUTE PAIN

## 2019-12-05 ASSESSMENT — PAIN DESCRIPTION - ORIENTATION
ORIENTATION: RIGHT

## 2019-12-05 ASSESSMENT — PAIN DESCRIPTION - LOCATION
LOCATION: HIP

## 2019-12-05 ASSESSMENT — PAIN DESCRIPTION - FREQUENCY
FREQUENCY: INTERMITTENT

## 2019-12-06 LAB
ANION GAP SERPL CALCULATED.3IONS-SCNC: 8 MMOL/L (ref 3–16)
BUN BLDV-MCNC: 32 MG/DL (ref 7–20)
CALCIUM SERPL-MCNC: 8.2 MG/DL (ref 8.3–10.6)
CHLORIDE BLD-SCNC: 97 MMOL/L (ref 99–110)
CO2: 31 MMOL/L (ref 21–32)
CREAT SERPL-MCNC: 0.7 MG/DL (ref 0.8–1.3)
GFR AFRICAN AMERICAN: >60
GFR NON-AFRICAN AMERICAN: >60
GLUCOSE BLD-MCNC: 118 MG/DL (ref 70–99)
L. PNEUMOPHILA SEROGP 1 UR AG: NORMAL
POTASSIUM SERPL-SCNC: 3.7 MMOL/L (ref 3.5–5.1)
SODIUM BLD-SCNC: 136 MMOL/L (ref 136–145)
STREP PNEUMONIAE ANTIGEN, URINE: NORMAL

## 2019-12-06 PROCEDURE — 97530 THERAPEUTIC ACTIVITIES: CPT

## 2019-12-06 PROCEDURE — 97535 SELF CARE MNGMENT TRAINING: CPT

## 2019-12-06 PROCEDURE — 99232 SBSQ HOSP IP/OBS MODERATE 35: CPT | Performed by: INTERNAL MEDICINE

## 2019-12-06 PROCEDURE — 97162 PT EVAL MOD COMPLEX 30 MIN: CPT

## 2019-12-06 PROCEDURE — 36591 DRAW BLOOD OFF VENOUS DEVICE: CPT

## 2019-12-06 PROCEDURE — 6360000002 HC RX W HCPCS: Performed by: ORTHOPAEDIC SURGERY

## 2019-12-06 PROCEDURE — 6360000002 HC RX W HCPCS: Performed by: INTERNAL MEDICINE

## 2019-12-06 PROCEDURE — 6370000000 HC RX 637 (ALT 250 FOR IP): Performed by: ORTHOPAEDIC SURGERY

## 2019-12-06 PROCEDURE — 6370000000 HC RX 637 (ALT 250 FOR IP): Performed by: INTERNAL MEDICINE

## 2019-12-06 PROCEDURE — 2700000000 HC OXYGEN THERAPY PER DAY

## 2019-12-06 PROCEDURE — 97166 OT EVAL MOD COMPLEX 45 MIN: CPT

## 2019-12-06 PROCEDURE — 6370000000 HC RX 637 (ALT 250 FOR IP): Performed by: NURSE PRACTITIONER

## 2019-12-06 PROCEDURE — 80048 BASIC METABOLIC PNL TOTAL CA: CPT

## 2019-12-06 PROCEDURE — 1200000000 HC SEMI PRIVATE

## 2019-12-06 PROCEDURE — 2580000003 HC RX 258: Performed by: ORTHOPAEDIC SURGERY

## 2019-12-06 PROCEDURE — 94761 N-INVAS EAR/PLS OXIMETRY MLT: CPT

## 2019-12-06 PROCEDURE — 94640 AIRWAY INHALATION TREATMENT: CPT

## 2019-12-06 RX ORDER — ASPIRIN 81 MG/1
81 TABLET ORAL 2 TIMES DAILY
Qty: 60 TABLET | Refills: 0 | Status: SHIPPED | OUTPATIENT
Start: 2019-12-06 | End: 2020-03-11

## 2019-12-06 RX ORDER — OXYCODONE HYDROCHLORIDE AND ACETAMINOPHEN 5; 325 MG/1; MG/1
1 TABLET ORAL EVERY 6 HOURS PRN
Status: DISCONTINUED | OUTPATIENT
Start: 2019-12-06 | End: 2019-12-08

## 2019-12-06 RX ORDER — FLUCONAZOLE 100 MG/1
200 TABLET ORAL DAILY
Status: DISCONTINUED | OUTPATIENT
Start: 2019-12-07 | End: 2019-12-10 | Stop reason: HOSPADM

## 2019-12-06 RX ORDER — OXYCODONE HYDROCHLORIDE AND ACETAMINOPHEN 5; 325 MG/1; MG/1
1 TABLET ORAL EVERY 6 HOURS PRN
Qty: 28 TABLET | Refills: 0 | Status: SHIPPED | OUTPATIENT
Start: 2019-12-06 | End: 2019-12-13

## 2019-12-06 RX ADMIN — MORPHINE SULFATE 2 MG: 2 INJECTION, SOLUTION INTRAMUSCULAR; INTRAVENOUS at 01:03

## 2019-12-06 RX ADMIN — SPIRONOLACTONE 25 MG: 25 TABLET ORAL at 09:21

## 2019-12-06 RX ADMIN — IPRATROPIUM BROMIDE AND ALBUTEROL SULFATE 1 AMPULE: 2.5; .5 SOLUTION RESPIRATORY (INHALATION) at 11:58

## 2019-12-06 RX ADMIN — FLUCONAZOLE 200 MG: 200 INJECTION, SOLUTION INTRAVENOUS at 09:21

## 2019-12-06 RX ADMIN — PREDNISONE 40 MG: 20 TABLET ORAL at 09:21

## 2019-12-06 RX ADMIN — IPRATROPIUM BROMIDE AND ALBUTEROL SULFATE 1 AMPULE: 2.5; .5 SOLUTION RESPIRATORY (INHALATION) at 16:04

## 2019-12-06 RX ADMIN — METOPROLOL SUCCINATE 25 MG: 25 TABLET, EXTENDED RELEASE ORAL at 09:21

## 2019-12-06 RX ADMIN — MORPHINE SULFATE 2 MG: 2 INJECTION, SOLUTION INTRAMUSCULAR; INTRAVENOUS at 06:39

## 2019-12-06 RX ADMIN — MAGNESIUM HYDROXIDE 30 ML: 400 SUSPENSION ORAL at 10:24

## 2019-12-06 RX ADMIN — MICONAZOLE NITRATE: 20 POWDER TOPICAL at 09:27

## 2019-12-06 RX ADMIN — MICONAZOLE NITRATE: 20 POWDER TOPICAL at 20:51

## 2019-12-06 RX ADMIN — ACETAZOLAMIDE 500 MG: 500 INJECTION, POWDER, LYOPHILIZED, FOR SOLUTION INTRAVENOUS at 05:48

## 2019-12-06 RX ADMIN — OXYCODONE HYDROCHLORIDE AND ACETAMINOPHEN 1 TABLET: 5; 325 TABLET ORAL at 13:53

## 2019-12-06 RX ADMIN — LEVOFLOXACIN 750 MG: 500 TABLET, FILM COATED ORAL at 09:21

## 2019-12-06 RX ADMIN — IPRATROPIUM BROMIDE AND ALBUTEROL SULFATE 1 AMPULE: 2.5; .5 SOLUTION RESPIRATORY (INHALATION) at 19:41

## 2019-12-06 RX ADMIN — SODIUM CHLORIDE, PRESERVATIVE FREE 10 ML: 5 INJECTION INTRAVENOUS at 09:27

## 2019-12-06 RX ADMIN — HEPARIN SODIUM 5000 UNITS: 5000 INJECTION INTRAVENOUS; SUBCUTANEOUS at 13:53

## 2019-12-06 RX ADMIN — HEPARIN SODIUM 5000 UNITS: 5000 INJECTION INTRAVENOUS; SUBCUTANEOUS at 22:00

## 2019-12-06 RX ADMIN — ASPIRIN 81 MG: 81 TABLET, COATED ORAL at 09:21

## 2019-12-06 RX ADMIN — HEPARIN SODIUM 5000 UNITS: 5000 INJECTION INTRAVENOUS; SUBCUTANEOUS at 05:54

## 2019-12-06 RX ADMIN — ATORVASTATIN CALCIUM 20 MG: 20 TABLET, FILM COATED ORAL at 09:20

## 2019-12-06 RX ADMIN — SODIUM CHLORIDE, PRESERVATIVE FREE 10 ML: 5 INJECTION INTRAVENOUS at 20:50

## 2019-12-06 RX ADMIN — IPRATROPIUM BROMIDE AND ALBUTEROL SULFATE 1 AMPULE: 2.5; .5 SOLUTION RESPIRATORY (INHALATION) at 08:16

## 2019-12-06 RX ADMIN — OXYCODONE HYDROCHLORIDE AND ACETAMINOPHEN 1 TABLET: 5; 325 TABLET ORAL at 21:09

## 2019-12-06 ASSESSMENT — PAIN DESCRIPTION - DESCRIPTORS
DESCRIPTORS: ACHING

## 2019-12-06 ASSESSMENT — PAIN DESCRIPTION - PROGRESSION

## 2019-12-06 ASSESSMENT — PAIN DESCRIPTION - LOCATION
LOCATION: HIP

## 2019-12-06 ASSESSMENT — PAIN - FUNCTIONAL ASSESSMENT
PAIN_FUNCTIONAL_ASSESSMENT: PREVENTS OR INTERFERES SOME ACTIVE ACTIVITIES AND ADLS

## 2019-12-06 ASSESSMENT — PAIN SCALES - GENERAL
PAINLEVEL_OUTOF10: 5
PAINLEVEL_OUTOF10: 6
PAINLEVEL_OUTOF10: 5
PAINLEVEL_OUTOF10: 4
PAINLEVEL_OUTOF10: 6
PAINLEVEL_OUTOF10: 3
PAINLEVEL_OUTOF10: 4
PAINLEVEL_OUTOF10: 5
PAINLEVEL_OUTOF10: 6

## 2019-12-06 ASSESSMENT — PAIN DESCRIPTION - FREQUENCY
FREQUENCY: INTERMITTENT

## 2019-12-06 ASSESSMENT — PAIN DESCRIPTION - PAIN TYPE
TYPE: ACUTE PAIN

## 2019-12-06 ASSESSMENT — PAIN DESCRIPTION - ONSET
ONSET: ON-GOING

## 2019-12-06 ASSESSMENT — PAIN DESCRIPTION - ORIENTATION
ORIENTATION: RIGHT

## 2019-12-06 ASSESSMENT — PAIN SCALES - WONG BAKER: WONGBAKER_NUMERICALRESPONSE: 0

## 2019-12-07 LAB
ANION GAP SERPL CALCULATED.3IONS-SCNC: 8 MMOL/L (ref 3–16)
BUN BLDV-MCNC: 30 MG/DL (ref 7–20)
CALCIUM SERPL-MCNC: 8.7 MG/DL (ref 8.3–10.6)
CHLORIDE BLD-SCNC: 98 MMOL/L (ref 99–110)
CO2: 29 MMOL/L (ref 21–32)
CREAT SERPL-MCNC: 0.7 MG/DL (ref 0.8–1.3)
CULTURE, RESPIRATORY: NORMAL
GFR AFRICAN AMERICAN: >60
GFR NON-AFRICAN AMERICAN: >60
GLUCOSE BLD-MCNC: 111 MG/DL (ref 70–99)
GRAM STAIN RESULT: NORMAL
POTASSIUM SERPL-SCNC: 3.9 MMOL/L (ref 3.5–5.1)
SODIUM BLD-SCNC: 135 MMOL/L (ref 136–145)

## 2019-12-07 PROCEDURE — 6370000000 HC RX 637 (ALT 250 FOR IP): Performed by: INTERNAL MEDICINE

## 2019-12-07 PROCEDURE — 6370000000 HC RX 637 (ALT 250 FOR IP): Performed by: NURSE PRACTITIONER

## 2019-12-07 PROCEDURE — 6370000000 HC RX 637 (ALT 250 FOR IP): Performed by: HOSPITALIST

## 2019-12-07 PROCEDURE — 6360000002 HC RX W HCPCS: Performed by: ORTHOPAEDIC SURGERY

## 2019-12-07 PROCEDURE — 2580000003 HC RX 258: Performed by: ORTHOPAEDIC SURGERY

## 2019-12-07 PROCEDURE — 80048 BASIC METABOLIC PNL TOTAL CA: CPT

## 2019-12-07 PROCEDURE — 94640 AIRWAY INHALATION TREATMENT: CPT

## 2019-12-07 PROCEDURE — 94761 N-INVAS EAR/PLS OXIMETRY MLT: CPT

## 2019-12-07 PROCEDURE — 6370000000 HC RX 637 (ALT 250 FOR IP): Performed by: ORTHOPAEDIC SURGERY

## 2019-12-07 PROCEDURE — 6360000002 HC RX W HCPCS: Performed by: HOSPITALIST

## 2019-12-07 PROCEDURE — 99232 SBSQ HOSP IP/OBS MODERATE 35: CPT | Performed by: INTERNAL MEDICINE

## 2019-12-07 PROCEDURE — 36591 DRAW BLOOD OFF VENOUS DEVICE: CPT

## 2019-12-07 PROCEDURE — 1200000000 HC SEMI PRIVATE

## 2019-12-07 RX ORDER — TORSEMIDE 20 MG/1
20 TABLET ORAL DAILY
Status: DISCONTINUED | OUTPATIENT
Start: 2019-12-07 | End: 2019-12-10 | Stop reason: HOSPADM

## 2019-12-07 RX ADMIN — IPRATROPIUM BROMIDE AND ALBUTEROL SULFATE 1 AMPULE: 2.5; .5 SOLUTION RESPIRATORY (INHALATION) at 12:20

## 2019-12-07 RX ADMIN — MICONAZOLE NITRATE: 20 POWDER TOPICAL at 12:01

## 2019-12-07 RX ADMIN — ASPIRIN 81 MG: 81 TABLET, COATED ORAL at 08:15

## 2019-12-07 RX ADMIN — FLUCONAZOLE 200 MG: 100 TABLET ORAL at 08:15

## 2019-12-07 RX ADMIN — MICONAZOLE NITRATE: 20 POWDER TOPICAL at 21:00

## 2019-12-07 RX ADMIN — IPRATROPIUM BROMIDE AND ALBUTEROL SULFATE 1 AMPULE: 2.5; .5 SOLUTION RESPIRATORY (INHALATION) at 21:02

## 2019-12-07 RX ADMIN — MORPHINE SULFATE 2 MG: 2 INJECTION, SOLUTION INTRAMUSCULAR; INTRAVENOUS at 23:39

## 2019-12-07 RX ADMIN — ATORVASTATIN CALCIUM 20 MG: 20 TABLET, FILM COATED ORAL at 08:15

## 2019-12-07 RX ADMIN — IPRATROPIUM BROMIDE AND ALBUTEROL SULFATE 1 AMPULE: 2.5; .5 SOLUTION RESPIRATORY (INHALATION) at 16:27

## 2019-12-07 RX ADMIN — LEVOFLOXACIN 750 MG: 500 TABLET, FILM COATED ORAL at 08:15

## 2019-12-07 RX ADMIN — HEPARIN SODIUM 5000 UNITS: 5000 INJECTION INTRAVENOUS; SUBCUTANEOUS at 22:19

## 2019-12-07 RX ADMIN — SODIUM CHLORIDE, PRESERVATIVE FREE 10 ML: 5 INJECTION INTRAVENOUS at 21:00

## 2019-12-07 RX ADMIN — SODIUM CHLORIDE, PRESERVATIVE FREE 10 ML: 5 INJECTION INTRAVENOUS at 12:02

## 2019-12-07 RX ADMIN — METOPROLOL SUCCINATE 25 MG: 25 TABLET, EXTENDED RELEASE ORAL at 08:15

## 2019-12-07 RX ADMIN — SPIRONOLACTONE 25 MG: 25 TABLET ORAL at 08:15

## 2019-12-07 RX ADMIN — HEPARIN SODIUM 5000 UNITS: 5000 INJECTION INTRAVENOUS; SUBCUTANEOUS at 05:54

## 2019-12-07 RX ADMIN — PREDNISONE 40 MG: 20 TABLET ORAL at 08:15

## 2019-12-07 RX ADMIN — TORSEMIDE 20 MG: 20 TABLET ORAL at 12:01

## 2019-12-07 RX ADMIN — IPRATROPIUM BROMIDE AND ALBUTEROL SULFATE 1 AMPULE: 2.5; .5 SOLUTION RESPIRATORY (INHALATION) at 08:24

## 2019-12-07 RX ADMIN — OXYCODONE HYDROCHLORIDE AND ACETAMINOPHEN 1 TABLET: 5; 325 TABLET ORAL at 08:14

## 2019-12-07 RX ADMIN — HEPARIN SODIUM 5000 UNITS: 5000 INJECTION INTRAVENOUS; SUBCUTANEOUS at 16:23

## 2019-12-07 RX ADMIN — OXYCODONE HYDROCHLORIDE AND ACETAMINOPHEN 1 TABLET: 5; 325 TABLET ORAL at 22:36

## 2019-12-07 RX ADMIN — OXYCODONE HYDROCHLORIDE AND ACETAMINOPHEN 1 TABLET: 5; 325 TABLET ORAL at 16:23

## 2019-12-07 ASSESSMENT — PAIN - FUNCTIONAL ASSESSMENT
PAIN_FUNCTIONAL_ASSESSMENT: ACTIVITIES ARE NOT PREVENTED

## 2019-12-07 ASSESSMENT — PAIN DESCRIPTION - FREQUENCY
FREQUENCY: CONTINUOUS

## 2019-12-07 ASSESSMENT — PAIN DESCRIPTION - PROGRESSION

## 2019-12-07 ASSESSMENT — PAIN DESCRIPTION - LOCATION
LOCATION: HIP

## 2019-12-07 ASSESSMENT — PAIN SCALES - GENERAL
PAINLEVEL_OUTOF10: 6
PAINLEVEL_OUTOF10: 9
PAINLEVEL_OUTOF10: 4
PAINLEVEL_OUTOF10: 5
PAINLEVEL_OUTOF10: 9

## 2019-12-07 ASSESSMENT — PAIN DESCRIPTION - ONSET
ONSET: ON-GOING

## 2019-12-07 ASSESSMENT — PAIN DESCRIPTION - ORIENTATION
ORIENTATION: RIGHT

## 2019-12-07 ASSESSMENT — PAIN DESCRIPTION - DESCRIPTORS
DESCRIPTORS: ACHING;DISCOMFORT
DESCRIPTORS: ACHING;CONSTANT;DISCOMFORT
DESCRIPTORS: ACHING;DISCOMFORT
DESCRIPTORS: ACHING;DISCOMFORT
DESCRIPTORS: ACHING;CONSTANT;DISCOMFORT

## 2019-12-07 ASSESSMENT — PAIN DESCRIPTION - PAIN TYPE
TYPE: ACUTE PAIN

## 2019-12-07 ASSESSMENT — PAIN SCALES - WONG BAKER
WONGBAKER_NUMERICALRESPONSE: 6
WONGBAKER_NUMERICALRESPONSE: 4
WONGBAKER_NUMERICALRESPONSE: 4

## 2019-12-08 LAB
ANION GAP SERPL CALCULATED.3IONS-SCNC: 9 MMOL/L (ref 3–16)
BASOPHILS ABSOLUTE: 0 K/UL (ref 0–0.2)
BASOPHILS RELATIVE PERCENT: 0 %
BUN BLDV-MCNC: 36 MG/DL (ref 7–20)
CALCIUM SERPL-MCNC: 8.4 MG/DL (ref 8.3–10.6)
CHLORIDE BLD-SCNC: 98 MMOL/L (ref 99–110)
CO2: 27 MMOL/L (ref 21–32)
CREAT SERPL-MCNC: 0.9 MG/DL (ref 0.8–1.3)
EOSINOPHILS ABSOLUTE: 0 K/UL (ref 0–0.6)
EOSINOPHILS RELATIVE PERCENT: 0.2 %
GFR AFRICAN AMERICAN: >60
GFR NON-AFRICAN AMERICAN: >60
GLUCOSE BLD-MCNC: 114 MG/DL (ref 70–99)
HCT VFR BLD CALC: 35 % (ref 40.5–52.5)
HEMOGLOBIN: 11.1 G/DL (ref 13.5–17.5)
LYMPHOCYTES ABSOLUTE: 0.7 K/UL (ref 1–5.1)
LYMPHOCYTES RELATIVE PERCENT: 7.2 %
MCH RBC QN AUTO: 32.4 PG (ref 26–34)
MCHC RBC AUTO-ENTMCNC: 31.7 G/DL (ref 31–36)
MCV RBC AUTO: 102 FL (ref 80–100)
MONOCYTES ABSOLUTE: 0.9 K/UL (ref 0–1.3)
MONOCYTES RELATIVE PERCENT: 8.6 %
NEUTROPHILS ABSOLUTE: 8.7 K/UL (ref 1.7–7.7)
NEUTROPHILS RELATIVE PERCENT: 84 %
PDW BLD-RTO: 17.5 % (ref 12.4–15.4)
PLATELET # BLD: 115 K/UL (ref 135–450)
PMV BLD AUTO: 8.5 FL (ref 5–10.5)
POTASSIUM REFLEX MAGNESIUM: 4.3 MMOL/L (ref 3.5–5.1)
POTASSIUM SERPL-SCNC: 4.3 MMOL/L (ref 3.5–5.1)
RBC # BLD: 3.43 M/UL (ref 4.2–5.9)
SODIUM BLD-SCNC: 134 MMOL/L (ref 136–145)
WBC # BLD: 10.4 K/UL (ref 4–11)

## 2019-12-08 PROCEDURE — 6370000000 HC RX 637 (ALT 250 FOR IP): Performed by: HOSPITALIST

## 2019-12-08 PROCEDURE — 6370000000 HC RX 637 (ALT 250 FOR IP): Performed by: NURSE PRACTITIONER

## 2019-12-08 PROCEDURE — 6370000000 HC RX 637 (ALT 250 FOR IP): Performed by: INTERNAL MEDICINE

## 2019-12-08 PROCEDURE — 2580000003 HC RX 258: Performed by: ORTHOPAEDIC SURGERY

## 2019-12-08 PROCEDURE — 36591 DRAW BLOOD OFF VENOUS DEVICE: CPT

## 2019-12-08 PROCEDURE — 6360000002 HC RX W HCPCS: Performed by: ORTHOPAEDIC SURGERY

## 2019-12-08 PROCEDURE — 6370000000 HC RX 637 (ALT 250 FOR IP): Performed by: ORTHOPAEDIC SURGERY

## 2019-12-08 PROCEDURE — 1200000000 HC SEMI PRIVATE

## 2019-12-08 PROCEDURE — 85025 COMPLETE CBC W/AUTO DIFF WBC: CPT

## 2019-12-08 PROCEDURE — 80048 BASIC METABOLIC PNL TOTAL CA: CPT

## 2019-12-08 PROCEDURE — 94640 AIRWAY INHALATION TREATMENT: CPT

## 2019-12-08 RX ORDER — OXYCODONE HYDROCHLORIDE AND ACETAMINOPHEN 5; 325 MG/1; MG/1
1 TABLET ORAL EVERY 8 HOURS PRN
Status: DISCONTINUED | OUTPATIENT
Start: 2019-12-08 | End: 2019-12-08

## 2019-12-08 RX ORDER — ACETAMINOPHEN 325 MG/1
650 TABLET ORAL EVERY 4 HOURS PRN
Status: DISCONTINUED | OUTPATIENT
Start: 2019-12-08 | End: 2019-12-10 | Stop reason: HOSPADM

## 2019-12-08 RX ORDER — OXYCODONE HYDROCHLORIDE AND ACETAMINOPHEN 5; 325 MG/1; MG/1
1 TABLET ORAL EVERY 6 HOURS PRN
Status: DISCONTINUED | OUTPATIENT
Start: 2019-12-08 | End: 2019-12-08

## 2019-12-08 RX ORDER — OXYCODONE HYDROCHLORIDE 10 MG/1
10 TABLET ORAL EVERY 4 HOURS PRN
Status: DISCONTINUED | OUTPATIENT
Start: 2019-12-08 | End: 2019-12-10 | Stop reason: HOSPADM

## 2019-12-08 RX ADMIN — IPRATROPIUM BROMIDE AND ALBUTEROL SULFATE 1 AMPULE: 2.5; .5 SOLUTION RESPIRATORY (INHALATION) at 12:13

## 2019-12-08 RX ADMIN — PREDNISONE 40 MG: 20 TABLET ORAL at 08:36

## 2019-12-08 RX ADMIN — MICONAZOLE NITRATE: 20 POWDER TOPICAL at 08:38

## 2019-12-08 RX ADMIN — LEVOFLOXACIN 750 MG: 500 TABLET, FILM COATED ORAL at 08:36

## 2019-12-08 RX ADMIN — METOPROLOL SUCCINATE 25 MG: 25 TABLET, EXTENDED RELEASE ORAL at 08:37

## 2019-12-08 RX ADMIN — ATORVASTATIN CALCIUM 20 MG: 20 TABLET, FILM COATED ORAL at 08:37

## 2019-12-08 RX ADMIN — OXYCODONE HYDROCHLORIDE 10 MG: 10 TABLET ORAL at 19:18

## 2019-12-08 RX ADMIN — OXYCODONE HYDROCHLORIDE 10 MG: 10 TABLET ORAL at 23:18

## 2019-12-08 RX ADMIN — TORSEMIDE 20 MG: 20 TABLET ORAL at 08:37

## 2019-12-08 RX ADMIN — RIVAROXABAN 20 MG: 20 TABLET, FILM COATED ORAL at 08:36

## 2019-12-08 RX ADMIN — SPIRONOLACTONE 25 MG: 25 TABLET ORAL at 08:37

## 2019-12-08 RX ADMIN — ASPIRIN 81 MG: 81 TABLET, COATED ORAL at 08:37

## 2019-12-08 RX ADMIN — OXYCODONE HYDROCHLORIDE AND ACETAMINOPHEN 1 TABLET: 5; 325 TABLET ORAL at 17:57

## 2019-12-08 RX ADMIN — MORPHINE SULFATE 2 MG: 2 INJECTION, SOLUTION INTRAMUSCULAR; INTRAVENOUS at 04:21

## 2019-12-08 RX ADMIN — MICONAZOLE NITRATE: 20 POWDER TOPICAL at 20:00

## 2019-12-08 RX ADMIN — OXYCODONE HYDROCHLORIDE AND ACETAMINOPHEN 1 TABLET: 5; 325 TABLET ORAL at 08:37

## 2019-12-08 RX ADMIN — FLUCONAZOLE 200 MG: 100 TABLET ORAL at 08:37

## 2019-12-08 RX ADMIN — SODIUM CHLORIDE, PRESERVATIVE FREE 10 ML: 5 INJECTION INTRAVENOUS at 20:01

## 2019-12-08 RX ADMIN — IPRATROPIUM BROMIDE AND ALBUTEROL SULFATE 1 AMPULE: 2.5; .5 SOLUTION RESPIRATORY (INHALATION) at 15:35

## 2019-12-08 RX ADMIN — IPRATROPIUM BROMIDE AND ALBUTEROL SULFATE 1 AMPULE: 2.5; .5 SOLUTION RESPIRATORY (INHALATION) at 21:18

## 2019-12-08 ASSESSMENT — PAIN DESCRIPTION - FREQUENCY
FREQUENCY: CONTINUOUS

## 2019-12-08 ASSESSMENT — PAIN - FUNCTIONAL ASSESSMENT
PAIN_FUNCTIONAL_ASSESSMENT: ACTIVITIES ARE NOT PREVENTED

## 2019-12-08 ASSESSMENT — PAIN DESCRIPTION - DESCRIPTORS
DESCRIPTORS: ACHING;DISCOMFORT

## 2019-12-08 ASSESSMENT — PAIN DESCRIPTION - ORIENTATION
ORIENTATION: RIGHT

## 2019-12-08 ASSESSMENT — PAIN DESCRIPTION - PROGRESSION

## 2019-12-08 ASSESSMENT — PAIN DESCRIPTION - ONSET
ONSET: ON-GOING

## 2019-12-08 ASSESSMENT — PAIN SCALES - GENERAL
PAINLEVEL_OUTOF10: 6
PAINLEVEL_OUTOF10: 10
PAINLEVEL_OUTOF10: 10
PAINLEVEL_OUTOF10: 6
PAINLEVEL_OUTOF10: 5
PAINLEVEL_OUTOF10: 6
PAINLEVEL_OUTOF10: 7
PAINLEVEL_OUTOF10: 4

## 2019-12-08 ASSESSMENT — PAIN DESCRIPTION - LOCATION
LOCATION: HIP

## 2019-12-08 ASSESSMENT — PAIN DESCRIPTION - PAIN TYPE
TYPE: ACUTE PAIN

## 2019-12-08 ASSESSMENT — PAIN SCALES - WONG BAKER
WONGBAKER_NUMERICALRESPONSE: 4
WONGBAKER_NUMERICALRESPONSE: 6
WONGBAKER_NUMERICALRESPONSE: 4
WONGBAKER_NUMERICALRESPONSE: 4
WONGBAKER_NUMERICALRESPONSE: 6
WONGBAKER_NUMERICALRESPONSE: 6
WONGBAKER_NUMERICALRESPONSE: 4

## 2019-12-09 LAB
ANION GAP SERPL CALCULATED.3IONS-SCNC: 8 MMOL/L (ref 3–16)
ANISOCYTOSIS: ABNORMAL
BANDED NEUTROPHILS RELATIVE PERCENT: 1 % (ref 0–7)
BASOPHILS ABSOLUTE: 0 K/UL (ref 0–0.2)
BASOPHILS RELATIVE PERCENT: 0 %
BUN BLDV-MCNC: 34 MG/DL (ref 7–20)
CALCIUM SERPL-MCNC: 8.4 MG/DL (ref 8.3–10.6)
CHLORIDE BLD-SCNC: 95 MMOL/L (ref 99–110)
CO2: 29 MMOL/L (ref 21–32)
CREAT SERPL-MCNC: 0.8 MG/DL (ref 0.8–1.3)
EOSINOPHILS ABSOLUTE: 0 K/UL (ref 0–0.6)
EOSINOPHILS RELATIVE PERCENT: 0 %
GFR AFRICAN AMERICAN: >60
GFR NON-AFRICAN AMERICAN: >60
GLUCOSE BLD-MCNC: 110 MG/DL (ref 70–99)
HCT VFR BLD CALC: 36.8 % (ref 40.5–52.5)
HEMOGLOBIN: 11.7 G/DL (ref 13.5–17.5)
LYMPHOCYTES ABSOLUTE: 0.7 K/UL (ref 1–5.1)
LYMPHOCYTES RELATIVE PERCENT: 6 %
MACROCYTES: ABNORMAL
MCH RBC QN AUTO: 32.3 PG (ref 26–34)
MCHC RBC AUTO-ENTMCNC: 31.7 G/DL (ref 31–36)
MCV RBC AUTO: 102 FL (ref 80–100)
MONOCYTES ABSOLUTE: 0.9 K/UL (ref 0–1.3)
MONOCYTES RELATIVE PERCENT: 8 %
NEUTROPHILS ABSOLUTE: 9.5 K/UL (ref 1.7–7.7)
NEUTROPHILS RELATIVE PERCENT: 85 %
OVALOCYTES: ABNORMAL
PDW BLD-RTO: 17.3 % (ref 12.4–15.4)
PLATELET # BLD: 113 K/UL (ref 135–450)
PLATELET SLIDE REVIEW: ABNORMAL
PMV BLD AUTO: 8.7 FL (ref 5–10.5)
POIKILOCYTES: ABNORMAL
POLYCHROMASIA: ABNORMAL
POTASSIUM REFLEX MAGNESIUM: 4.3 MMOL/L (ref 3.5–5.1)
POTASSIUM SERPL-SCNC: 4.3 MMOL/L (ref 3.5–5.1)
RBC # BLD: 3.61 M/UL (ref 4.2–5.9)
SODIUM BLD-SCNC: 132 MMOL/L (ref 136–145)
TEAR DROP CELLS: ABNORMAL
WBC # BLD: 11 K/UL (ref 4–11)

## 2019-12-09 PROCEDURE — 80048 BASIC METABOLIC PNL TOTAL CA: CPT

## 2019-12-09 PROCEDURE — 94761 N-INVAS EAR/PLS OXIMETRY MLT: CPT

## 2019-12-09 PROCEDURE — 99232 SBSQ HOSP IP/OBS MODERATE 35: CPT | Performed by: NURSE PRACTITIONER

## 2019-12-09 PROCEDURE — 36591 DRAW BLOOD OFF VENOUS DEVICE: CPT

## 2019-12-09 PROCEDURE — 2580000003 HC RX 258: Performed by: ORTHOPAEDIC SURGERY

## 2019-12-09 PROCEDURE — 94640 AIRWAY INHALATION TREATMENT: CPT

## 2019-12-09 PROCEDURE — 1200000000 HC SEMI PRIVATE

## 2019-12-09 PROCEDURE — 6370000000 HC RX 637 (ALT 250 FOR IP): Performed by: HOSPITALIST

## 2019-12-09 PROCEDURE — 6370000000 HC RX 637 (ALT 250 FOR IP): Performed by: INTERNAL MEDICINE

## 2019-12-09 PROCEDURE — 6370000000 HC RX 637 (ALT 250 FOR IP): Performed by: ORTHOPAEDIC SURGERY

## 2019-12-09 PROCEDURE — 85025 COMPLETE CBC W/AUTO DIFF WBC: CPT

## 2019-12-09 PROCEDURE — 6370000000 HC RX 637 (ALT 250 FOR IP): Performed by: NURSE PRACTITIONER

## 2019-12-09 RX ORDER — SPIRONOLACTONE 25 MG/1
12.5 TABLET ORAL DAILY
Qty: 30 TABLET | Refills: 3 | Status: SHIPPED | OUTPATIENT
Start: 2019-12-10

## 2019-12-09 RX ORDER — TORSEMIDE 20 MG/1
20 TABLET ORAL DAILY
Qty: 30 TABLET | Refills: 3 | Status: SHIPPED | OUTPATIENT
Start: 2019-12-10

## 2019-12-09 RX ORDER — SPIRONOLACTONE 25 MG/1
12.5 TABLET ORAL DAILY
Status: DISCONTINUED | OUTPATIENT
Start: 2019-12-10 | End: 2019-12-10 | Stop reason: HOSPADM

## 2019-12-09 RX ADMIN — PREDNISONE 40 MG: 20 TABLET ORAL at 08:41

## 2019-12-09 RX ADMIN — SPIRONOLACTONE 25 MG: 25 TABLET ORAL at 08:41

## 2019-12-09 RX ADMIN — OXYCODONE HYDROCHLORIDE 10 MG: 10 TABLET ORAL at 03:51

## 2019-12-09 RX ADMIN — OXYCODONE HYDROCHLORIDE 10 MG: 10 TABLET ORAL at 23:55

## 2019-12-09 RX ADMIN — OXYCODONE HYDROCHLORIDE 10 MG: 10 TABLET ORAL at 08:41

## 2019-12-09 RX ADMIN — FLUCONAZOLE 200 MG: 100 TABLET ORAL at 08:41

## 2019-12-09 RX ADMIN — ACETAMINOPHEN 650 MG: 325 TABLET ORAL at 10:49

## 2019-12-09 RX ADMIN — MICONAZOLE NITRATE: 20 POWDER TOPICAL at 09:28

## 2019-12-09 RX ADMIN — TORSEMIDE 20 MG: 20 TABLET ORAL at 08:41

## 2019-12-09 RX ADMIN — MICONAZOLE NITRATE: 20 POWDER TOPICAL at 20:51

## 2019-12-09 RX ADMIN — IPRATROPIUM BROMIDE AND ALBUTEROL SULFATE 1 AMPULE: 2.5; .5 SOLUTION RESPIRATORY (INHALATION) at 07:55

## 2019-12-09 RX ADMIN — SODIUM CHLORIDE, PRESERVATIVE FREE 10 ML: 5 INJECTION INTRAVENOUS at 20:51

## 2019-12-09 RX ADMIN — LEVOFLOXACIN 750 MG: 500 TABLET, FILM COATED ORAL at 08:40

## 2019-12-09 RX ADMIN — OXYCODONE HYDROCHLORIDE 10 MG: 10 TABLET ORAL at 17:20

## 2019-12-09 RX ADMIN — IPRATROPIUM BROMIDE AND ALBUTEROL SULFATE 1 AMPULE: 2.5; .5 SOLUTION RESPIRATORY (INHALATION) at 12:05

## 2019-12-09 RX ADMIN — METOPROLOL SUCCINATE 25 MG: 25 TABLET, EXTENDED RELEASE ORAL at 08:41

## 2019-12-09 RX ADMIN — RIVAROXABAN 20 MG: 20 TABLET, FILM COATED ORAL at 08:40

## 2019-12-09 RX ADMIN — ATORVASTATIN CALCIUM 20 MG: 20 TABLET, FILM COATED ORAL at 08:41

## 2019-12-09 RX ADMIN — IPRATROPIUM BROMIDE AND ALBUTEROL SULFATE 1 AMPULE: 2.5; .5 SOLUTION RESPIRATORY (INHALATION) at 16:00

## 2019-12-09 RX ADMIN — ASPIRIN 81 MG: 81 TABLET, COATED ORAL at 08:40

## 2019-12-09 ASSESSMENT — PAIN DESCRIPTION - PAIN TYPE
TYPE: ACUTE PAIN

## 2019-12-09 ASSESSMENT — PAIN DESCRIPTION - PROGRESSION
CLINICAL_PROGRESSION: GRADUALLY WORSENING
CLINICAL_PROGRESSION: GRADUALLY WORSENING
CLINICAL_PROGRESSION: NOT CHANGED
CLINICAL_PROGRESSION: GRADUALLY WORSENING
CLINICAL_PROGRESSION: NOT CHANGED
CLINICAL_PROGRESSION: GRADUALLY WORSENING
CLINICAL_PROGRESSION: GRADUALLY IMPROVING
CLINICAL_PROGRESSION: GRADUALLY IMPROVING
CLINICAL_PROGRESSION: NOT CHANGED
CLINICAL_PROGRESSION: GRADUALLY IMPROVING
CLINICAL_PROGRESSION: NOT CHANGED
CLINICAL_PROGRESSION: NOT CHANGED
CLINICAL_PROGRESSION: GRADUALLY IMPROVING
CLINICAL_PROGRESSION: GRADUALLY WORSENING

## 2019-12-09 ASSESSMENT — PAIN DESCRIPTION - DESCRIPTORS
DESCRIPTORS: ACHING
DESCRIPTORS: ACHING;DISCOMFORT
DESCRIPTORS: ACHING

## 2019-12-09 ASSESSMENT — PAIN DESCRIPTION - ONSET
ONSET: ON-GOING
ONSET: PROGRESSIVE
ONSET: PROGRESSIVE
ONSET: ON-GOING
ONSET: PROGRESSIVE
ONSET: ON-GOING
ONSET: ON-GOING
ONSET: PROGRESSIVE
ONSET: ON-GOING
ONSET: ON-GOING

## 2019-12-09 ASSESSMENT — PAIN - FUNCTIONAL ASSESSMENT
PAIN_FUNCTIONAL_ASSESSMENT: PREVENTS OR INTERFERES WITH ALL ACTIVE AND SOME PASSIVE ACTIVITIES
PAIN_FUNCTIONAL_ASSESSMENT: PREVENTS OR INTERFERES WITH MANY ACTIVE NOT PASSIVE ACTIVITIES
PAIN_FUNCTIONAL_ASSESSMENT: PREVENTS OR INTERFERES SOME ACTIVE ACTIVITIES AND ADLS
PAIN_FUNCTIONAL_ASSESSMENT: PREVENTS OR INTERFERES WITH MANY ACTIVE NOT PASSIVE ACTIVITIES
PAIN_FUNCTIONAL_ASSESSMENT: PREVENTS OR INTERFERES SOME ACTIVE ACTIVITIES AND ADLS
PAIN_FUNCTIONAL_ASSESSMENT: PREVENTS OR INTERFERES WITH MANY ACTIVE NOT PASSIVE ACTIVITIES
PAIN_FUNCTIONAL_ASSESSMENT: PREVENTS OR INTERFERES SOME ACTIVE ACTIVITIES AND ADLS
PAIN_FUNCTIONAL_ASSESSMENT: ACTIVITIES ARE NOT PREVENTED
PAIN_FUNCTIONAL_ASSESSMENT: PREVENTS OR INTERFERES SOME ACTIVE ACTIVITIES AND ADLS
PAIN_FUNCTIONAL_ASSESSMENT: PREVENTS OR INTERFERES WITH MANY ACTIVE NOT PASSIVE ACTIVITIES

## 2019-12-09 ASSESSMENT — PAIN DESCRIPTION - LOCATION
LOCATION: HIP

## 2019-12-09 ASSESSMENT — PAIN SCALES - GENERAL
PAINLEVEL_OUTOF10: 7
PAINLEVEL_OUTOF10: 0
PAINLEVEL_OUTOF10: 6
PAINLEVEL_OUTOF10: 7
PAINLEVEL_OUTOF10: 0
PAINLEVEL_OUTOF10: 8
PAINLEVEL_OUTOF10: 8
PAINLEVEL_OUTOF10: 0
PAINLEVEL_OUTOF10: 5
PAINLEVEL_OUTOF10: 7
PAINLEVEL_OUTOF10: 0
PAINLEVEL_OUTOF10: 7
PAINLEVEL_OUTOF10: 8

## 2019-12-09 ASSESSMENT — PAIN DESCRIPTION - FREQUENCY
FREQUENCY: CONTINUOUS

## 2019-12-09 ASSESSMENT — PAIN DESCRIPTION - ORIENTATION
ORIENTATION: RIGHT

## 2019-12-09 ASSESSMENT — PAIN SCALES - WONG BAKER: WONGBAKER_NUMERICALRESPONSE: 8

## 2019-12-10 VITALS
TEMPERATURE: 98.3 F | DIASTOLIC BLOOD PRESSURE: 77 MMHG | BODY MASS INDEX: 34.21 KG/M2 | RESPIRATION RATE: 16 BRPM | HEIGHT: 73 IN | HEART RATE: 77 BPM | OXYGEN SATURATION: 96 % | WEIGHT: 258.16 LBS | SYSTOLIC BLOOD PRESSURE: 118 MMHG

## 2019-12-10 LAB
ANION GAP SERPL CALCULATED.3IONS-SCNC: 9 MMOL/L (ref 3–16)
BUN BLDV-MCNC: 35 MG/DL (ref 7–20)
CALCIUM SERPL-MCNC: 8.5 MG/DL (ref 8.3–10.6)
CHLORIDE BLD-SCNC: 97 MMOL/L (ref 99–110)
CO2: 30 MMOL/L (ref 21–32)
CREAT SERPL-MCNC: 0.8 MG/DL (ref 0.8–1.3)
GFR AFRICAN AMERICAN: >60
GFR NON-AFRICAN AMERICAN: >60
GLUCOSE BLD-MCNC: 112 MG/DL (ref 70–99)
POTASSIUM SERPL-SCNC: 4.5 MMOL/L (ref 3.5–5.1)
PRO-BNP: 2801 PG/ML (ref 0–124)
SODIUM BLD-SCNC: 136 MMOL/L (ref 136–145)

## 2019-12-10 PROCEDURE — 2700000000 HC OXYGEN THERAPY PER DAY

## 2019-12-10 PROCEDURE — 36415 COLL VENOUS BLD VENIPUNCTURE: CPT

## 2019-12-10 PROCEDURE — 6370000000 HC RX 637 (ALT 250 FOR IP): Performed by: ORTHOPAEDIC SURGERY

## 2019-12-10 PROCEDURE — 2580000003 HC RX 258: Performed by: ORTHOPAEDIC SURGERY

## 2019-12-10 PROCEDURE — 6370000000 HC RX 637 (ALT 250 FOR IP): Performed by: NURSE PRACTITIONER

## 2019-12-10 PROCEDURE — 80048 BASIC METABOLIC PNL TOTAL CA: CPT

## 2019-12-10 PROCEDURE — 94640 AIRWAY INHALATION TREATMENT: CPT

## 2019-12-10 PROCEDURE — 6370000000 HC RX 637 (ALT 250 FOR IP): Performed by: HOSPITALIST

## 2019-12-10 PROCEDURE — 99231 SBSQ HOSP IP/OBS SF/LOW 25: CPT | Performed by: ORTHOPAEDIC SURGERY

## 2019-12-10 PROCEDURE — 6370000000 HC RX 637 (ALT 250 FOR IP): Performed by: INTERNAL MEDICINE

## 2019-12-10 PROCEDURE — 94760 N-INVAS EAR/PLS OXIMETRY 1: CPT

## 2019-12-10 PROCEDURE — 83880 ASSAY OF NATRIURETIC PEPTIDE: CPT

## 2019-12-10 RX ORDER — FLUCONAZOLE 200 MG/1
200 TABLET ORAL DAILY
Qty: 8 TABLET | Refills: 0 | Status: SHIPPED | OUTPATIENT
Start: 2019-12-10 | End: 2019-12-18

## 2019-12-10 RX ADMIN — ASPIRIN 81 MG: 81 TABLET, COATED ORAL at 10:23

## 2019-12-10 RX ADMIN — FLUCONAZOLE 200 MG: 100 TABLET ORAL at 10:24

## 2019-12-10 RX ADMIN — MICONAZOLE NITRATE: 20 POWDER TOPICAL at 10:25

## 2019-12-10 RX ADMIN — ATORVASTATIN CALCIUM 20 MG: 20 TABLET, FILM COATED ORAL at 10:23

## 2019-12-10 RX ADMIN — RIVAROXABAN 20 MG: 20 TABLET, FILM COATED ORAL at 10:24

## 2019-12-10 RX ADMIN — IPRATROPIUM BROMIDE AND ALBUTEROL SULFATE 1 AMPULE: 2.5; .5 SOLUTION RESPIRATORY (INHALATION) at 11:57

## 2019-12-10 RX ADMIN — METOPROLOL SUCCINATE 25 MG: 25 TABLET, EXTENDED RELEASE ORAL at 10:24

## 2019-12-10 RX ADMIN — SPIRONOLACTONE 12.5 MG: 25 TABLET ORAL at 10:24

## 2019-12-10 RX ADMIN — OXYCODONE HYDROCHLORIDE 10 MG: 10 TABLET ORAL at 04:40

## 2019-12-10 RX ADMIN — IPRATROPIUM BROMIDE AND ALBUTEROL SULFATE 1 AMPULE: 2.5; .5 SOLUTION RESPIRATORY (INHALATION) at 08:47

## 2019-12-10 RX ADMIN — TORSEMIDE 20 MG: 20 TABLET ORAL at 10:24

## 2019-12-10 RX ADMIN — SODIUM CHLORIDE, PRESERVATIVE FREE 10 ML: 5 INJECTION INTRAVENOUS at 10:25

## 2019-12-10 RX ADMIN — OXYCODONE HYDROCHLORIDE 10 MG: 10 TABLET ORAL at 13:14

## 2019-12-10 ASSESSMENT — PAIN SCALES - WONG BAKER
WONGBAKER_NUMERICALRESPONSE: 0
WONGBAKER_NUMERICALRESPONSE: 0

## 2019-12-10 ASSESSMENT — PAIN DESCRIPTION - LOCATION
LOCATION: HIP

## 2019-12-10 ASSESSMENT — PAIN DESCRIPTION - FREQUENCY
FREQUENCY: CONTINUOUS

## 2019-12-10 ASSESSMENT — PAIN SCALES - GENERAL
PAINLEVEL_OUTOF10: 0
PAINLEVEL_OUTOF10: 6
PAINLEVEL_OUTOF10: 7
PAINLEVEL_OUTOF10: 4

## 2019-12-10 ASSESSMENT — PAIN DESCRIPTION - DESCRIPTORS
DESCRIPTORS: ACHING

## 2019-12-10 ASSESSMENT — PAIN DESCRIPTION - PAIN TYPE
TYPE: ACUTE PAIN

## 2019-12-10 ASSESSMENT — PAIN - FUNCTIONAL ASSESSMENT
PAIN_FUNCTIONAL_ASSESSMENT: PREVENTS OR INTERFERES SOME ACTIVE ACTIVITIES AND ADLS

## 2019-12-10 ASSESSMENT — PAIN DESCRIPTION - PROGRESSION
CLINICAL_PROGRESSION: NOT CHANGED
CLINICAL_PROGRESSION: NOT CHANGED
CLINICAL_PROGRESSION: GRADUALLY IMPROVING

## 2019-12-10 ASSESSMENT — PAIN DESCRIPTION - ONSET
ONSET: ON-GOING

## 2019-12-10 ASSESSMENT — PAIN DESCRIPTION - ORIENTATION
ORIENTATION: RIGHT

## 2019-12-11 ENCOUNTER — CARE COORDINATION (OUTPATIENT)
Dept: CASE MANAGEMENT | Age: 74
End: 2019-12-11

## 2019-12-11 LAB
ALBUMIN SERPL-MCNC: 3.1 G/DL
ALP BLD-CCNC: 104 U/L
ALT SERPL-CCNC: 27 U/L
ANION GAP SERPL CALCULATED.3IONS-SCNC: NORMAL MMOL/L
AST SERPL-CCNC: 28 U/L
BILIRUB SERPL-MCNC: 1.1 MG/DL (ref 0.1–1.4)
BUN BLDV-MCNC: 29 MG/DL
CALCIUM SERPL-MCNC: 8.7 MG/DL
CHLORIDE BLD-SCNC: 99 MMOL/L
CO2: 38 MMOL/L
CREAT SERPL-MCNC: 0.8 MG/DL
GFR CALCULATED: NORMAL
GLUCOSE BLD-MCNC: 79 MG/DL
HCT VFR BLD CALC: 35.7 % (ref 41–53)
HEMOGLOBIN: 11.6 G/DL (ref 13.5–17.5)
PLATELET # BLD: 151 K/ΜL
POTASSIUM SERPL-SCNC: 3.9 MMOL/L
SODIUM BLD-SCNC: 142 MMOL/L
TOTAL PROTEIN: 6.3
WBC # BLD: 9.9 10^3/ML

## 2019-12-12 ENCOUNTER — TELEPHONE (OUTPATIENT)
Dept: ORTHOPEDIC SURGERY | Age: 74
End: 2019-12-12

## 2019-12-25 ENCOUNTER — HOSPITAL ENCOUNTER (INPATIENT)
Age: 74
LOS: 8 days | Discharge: NURSING, REHAB OR LONG-TERM CARE | DRG: 314 | End: 2020-01-02
Attending: EMERGENCY MEDICINE | Admitting: INTERNAL MEDICINE
Payer: MEDICARE

## 2019-12-25 ENCOUNTER — APPOINTMENT (OUTPATIENT)
Dept: GENERAL RADIOLOGY | Age: 74
DRG: 314 | End: 2019-12-25
Payer: MEDICARE

## 2019-12-25 ENCOUNTER — APPOINTMENT (OUTPATIENT)
Dept: CT IMAGING | Age: 74
DRG: 314 | End: 2019-12-25
Payer: MEDICARE

## 2019-12-25 PROBLEM — R41.82 AMS (ALTERED MENTAL STATUS): Status: ACTIVE | Noted: 2019-12-25

## 2019-12-25 LAB
A/G RATIO: 0.8 (ref 1.1–2.2)
ALBUMIN SERPL-MCNC: 3.1 G/DL (ref 3.4–5)
ALP BLD-CCNC: 156 U/L (ref 40–129)
ALT SERPL-CCNC: 20 U/L (ref 10–40)
ANION GAP SERPL CALCULATED.3IONS-SCNC: 14 MMOL/L (ref 3–16)
ANION GAP SERPL CALCULATED.3IONS-SCNC: 14 MMOL/L (ref 3–16)
AST SERPL-CCNC: 32 U/L (ref 15–37)
BASOPHILS ABSOLUTE: 0 K/UL (ref 0–0.2)
BASOPHILS RELATIVE PERCENT: 0.2 %
BILIRUB SERPL-MCNC: 1.3 MG/DL (ref 0–1)
BILIRUBIN URINE: NEGATIVE
BLOOD, URINE: NEGATIVE
BUN BLDV-MCNC: 13 MG/DL (ref 7–20)
BUN BLDV-MCNC: 14 MG/DL (ref 7–20)
CALCIUM SERPL-MCNC: 8.8 MG/DL (ref 8.3–10.6)
CALCIUM SERPL-MCNC: 9 MG/DL (ref 8.3–10.6)
CHLORIDE BLD-SCNC: 100 MMOL/L (ref 99–110)
CHLORIDE BLD-SCNC: 103 MMOL/L (ref 99–110)
CLARITY: ABNORMAL
CO2: 24 MMOL/L (ref 21–32)
CO2: 25 MMOL/L (ref 21–32)
COLOR: YELLOW
CREAT SERPL-MCNC: 0.5 MG/DL (ref 0.8–1.3)
CREAT SERPL-MCNC: 0.7 MG/DL (ref 0.8–1.3)
EOSINOPHILS ABSOLUTE: 0 K/UL (ref 0–0.6)
EOSINOPHILS RELATIVE PERCENT: 0.3 %
EPITHELIAL CELLS, UA: 0 /HPF (ref 0–5)
GFR AFRICAN AMERICAN: >60
GFR AFRICAN AMERICAN: >60
GFR NON-AFRICAN AMERICAN: >60
GFR NON-AFRICAN AMERICAN: >60
GLOBULIN: 4.1 G/DL
GLUCOSE BLD-MCNC: 128 MG/DL (ref 70–99)
GLUCOSE BLD-MCNC: 151 MG/DL (ref 70–99)
GLUCOSE URINE: NEGATIVE MG/DL
HCT VFR BLD CALC: 37.2 % (ref 40.5–52.5)
HEMOGLOBIN: 12.1 G/DL (ref 13.5–17.5)
HYALINE CASTS: 0 /LPF (ref 0–8)
INR BLD: 2.16 (ref 0.86–1.14)
KETONES, URINE: NEGATIVE MG/DL
LACTIC ACID, SEPSIS: 1.1 MMOL/L (ref 0.4–1.9)
LEUKOCYTE ESTERASE, URINE: NEGATIVE
LYMPHOCYTES ABSOLUTE: 0.2 K/UL (ref 1–5.1)
LYMPHOCYTES RELATIVE PERCENT: 2.1 %
MCH RBC QN AUTO: 31.9 PG (ref 26–34)
MCHC RBC AUTO-ENTMCNC: 32.6 G/DL (ref 31–36)
MCV RBC AUTO: 98 FL (ref 80–100)
MICROSCOPIC EXAMINATION: YES
MONOCYTES ABSOLUTE: 0.6 K/UL (ref 0–1.3)
MONOCYTES RELATIVE PERCENT: 5.3 %
NEUTROPHILS ABSOLUTE: 9.6 K/UL (ref 1.7–7.7)
NEUTROPHILS RELATIVE PERCENT: 92.1 %
NITRITE, URINE: NEGATIVE
PDW BLD-RTO: 16.4 % (ref 12.4–15.4)
PH UA: 7 (ref 5–8)
PLATELET # BLD: 172 K/UL (ref 135–450)
PMV BLD AUTO: 8.4 FL (ref 5–10.5)
POTASSIUM REFLEX MAGNESIUM: 3.6 MMOL/L (ref 3.5–5.1)
POTASSIUM REFLEX MAGNESIUM: 3.9 MMOL/L (ref 3.5–5.1)
PRO-BNP: 2357 PG/ML (ref 0–124)
PROTEIN UA: NEGATIVE MG/DL
PROTHROMBIN TIME: 25.3 SEC (ref 10–13.2)
RBC # BLD: 3.8 M/UL (ref 4.2–5.9)
RBC UA: 1 /HPF (ref 0–4)
SODIUM BLD-SCNC: 139 MMOL/L (ref 136–145)
SODIUM BLD-SCNC: 141 MMOL/L (ref 136–145)
SPECIFIC GRAVITY UA: 1.01 (ref 1–1.03)
TOTAL CK: 33 U/L (ref 39–308)
TOTAL PROTEIN: 7.2 G/DL (ref 6.4–8.2)
TROPONIN: 0.03 NG/ML
URINE REFLEX TO CULTURE: ABNORMAL
URINE TYPE: ABNORMAL
UROBILINOGEN, URINE: 1 E.U./DL
WBC # BLD: 10.5 K/UL (ref 4–11)
WBC UA: 2 /HPF (ref 0–5)

## 2019-12-25 PROCEDURE — 87186 SC STD MICRODIL/AGAR DIL: CPT

## 2019-12-25 PROCEDURE — 85025 COMPLETE CBC W/AUTO DIFF WBC: CPT

## 2019-12-25 PROCEDURE — 81001 URINALYSIS AUTO W/SCOPE: CPT

## 2019-12-25 PROCEDURE — 2500000003 HC RX 250 WO HCPCS: Performed by: PHYSICIAN ASSISTANT

## 2019-12-25 PROCEDURE — 85610 PROTHROMBIN TIME: CPT

## 2019-12-25 PROCEDURE — 36415 COLL VENOUS BLD VENIPUNCTURE: CPT

## 2019-12-25 PROCEDURE — 82550 ASSAY OF CK (CPK): CPT

## 2019-12-25 PROCEDURE — 83605 ASSAY OF LACTIC ACID: CPT

## 2019-12-25 PROCEDURE — 80053 COMPREHEN METABOLIC PANEL: CPT

## 2019-12-25 PROCEDURE — 2060000000 HC ICU INTERMEDIATE R&B

## 2019-12-25 PROCEDURE — 2500000003 HC RX 250 WO HCPCS: Performed by: INTERNAL MEDICINE

## 2019-12-25 PROCEDURE — 96374 THER/PROPH/DIAG INJ IV PUSH: CPT

## 2019-12-25 PROCEDURE — 51702 INSERT TEMP BLADDER CATH: CPT

## 2019-12-25 PROCEDURE — 87150 DNA/RNA AMPLIFIED PROBE: CPT

## 2019-12-25 PROCEDURE — 83880 ASSAY OF NATRIURETIC PEPTIDE: CPT

## 2019-12-25 PROCEDURE — 87040 BLOOD CULTURE FOR BACTERIA: CPT

## 2019-12-25 PROCEDURE — 71045 X-RAY EXAM CHEST 1 VIEW: CPT

## 2019-12-25 PROCEDURE — 84484 ASSAY OF TROPONIN QUANT: CPT

## 2019-12-25 PROCEDURE — 6360000002 HC RX W HCPCS: Performed by: NURSE PRACTITIONER

## 2019-12-25 PROCEDURE — 2580000003 HC RX 258: Performed by: INTERNAL MEDICINE

## 2019-12-25 PROCEDURE — 93005 ELECTROCARDIOGRAM TRACING: CPT | Performed by: PHYSICIAN ASSISTANT

## 2019-12-25 PROCEDURE — 99285 EMERGENCY DEPT VISIT HI MDM: CPT

## 2019-12-25 PROCEDURE — 96361 HYDRATE IV INFUSION ADD-ON: CPT

## 2019-12-25 PROCEDURE — 2580000003 HC RX 258: Performed by: PHYSICIAN ASSISTANT

## 2019-12-25 PROCEDURE — 6370000000 HC RX 637 (ALT 250 FOR IP): Performed by: PHYSICIAN ASSISTANT

## 2019-12-25 PROCEDURE — 70450 CT HEAD/BRAIN W/O DYE: CPT

## 2019-12-25 RX ORDER — CITALOPRAM 20 MG/1
20 TABLET ORAL DAILY
Status: DISCONTINUED | OUTPATIENT
Start: 2019-12-26 | End: 2019-12-25

## 2019-12-25 RX ORDER — ONDANSETRON 2 MG/ML
4 INJECTION INTRAMUSCULAR; INTRAVENOUS EVERY 6 HOURS PRN
Status: DISCONTINUED | OUTPATIENT
Start: 2019-12-25 | End: 2020-01-03 | Stop reason: HOSPADM

## 2019-12-25 RX ORDER — ATORVASTATIN CALCIUM 20 MG/1
20 TABLET, FILM COATED ORAL NIGHTLY
Status: DISCONTINUED | OUTPATIENT
Start: 2019-12-25 | End: 2019-12-25

## 2019-12-25 RX ORDER — SPIRONOLACTONE 25 MG/1
12.5 TABLET ORAL DAILY
Status: DISCONTINUED | OUTPATIENT
Start: 2019-12-26 | End: 2019-12-25

## 2019-12-25 RX ORDER — ACETAMINOPHEN 325 MG/1
650 TABLET ORAL EVERY 6 HOURS PRN
Status: DISCONTINUED | OUTPATIENT
Start: 2019-12-25 | End: 2020-01-03 | Stop reason: HOSPADM

## 2019-12-25 RX ORDER — TORSEMIDE 20 MG/1
20 TABLET ORAL DAILY
Status: DISCONTINUED | OUTPATIENT
Start: 2019-12-26 | End: 2019-12-25

## 2019-12-25 RX ORDER — SODIUM CHLORIDE 0.9 % (FLUSH) 0.9 %
10 SYRINGE (ML) INJECTION PRN
Status: DISCONTINUED | OUTPATIENT
Start: 2019-12-25 | End: 2019-12-26 | Stop reason: SDUPTHER

## 2019-12-25 RX ORDER — POTASSIUM CHLORIDE 7.45 MG/ML
10 INJECTION INTRAVENOUS PRN
Status: DISCONTINUED | OUTPATIENT
Start: 2019-12-25 | End: 2020-01-03 | Stop reason: HOSPADM

## 2019-12-25 RX ORDER — POTASSIUM CHLORIDE 750 MG/1
10 TABLET, FILM COATED, EXTENDED RELEASE ORAL DAILY
Status: DISCONTINUED | OUTPATIENT
Start: 2019-12-26 | End: 2019-12-25

## 2019-12-25 RX ORDER — 0.9 % SODIUM CHLORIDE 0.9 %
1000 INTRAVENOUS SOLUTION INTRAVENOUS ONCE
Status: COMPLETED | OUTPATIENT
Start: 2019-12-25 | End: 2019-12-25

## 2019-12-25 RX ORDER — LANOLIN ALCOHOL/MO/W.PET/CERES
6 CREAM (GRAM) TOPICAL NIGHTLY PRN
COMMUNITY

## 2019-12-25 RX ORDER — CITALOPRAM 20 MG/1
20 TABLET ORAL DAILY
COMMUNITY

## 2019-12-25 RX ORDER — DILTIAZEM HYDROCHLORIDE 5 MG/ML
10 INJECTION INTRAVENOUS ONCE
Status: COMPLETED | OUTPATIENT
Start: 2019-12-25 | End: 2019-12-25

## 2019-12-25 RX ORDER — UBIDECARENONE 75 MG
100 CAPSULE ORAL DAILY
Status: DISCONTINUED | OUTPATIENT
Start: 2019-12-26 | End: 2019-12-25

## 2019-12-25 RX ORDER — ONDANSETRON 4 MG/1
4 TABLET, FILM COATED ORAL EVERY 6 HOURS PRN
COMMUNITY

## 2019-12-25 RX ORDER — MORPHINE SULFATE 4 MG/ML
4 INJECTION, SOLUTION INTRAMUSCULAR; INTRAVENOUS EVERY 4 HOURS PRN
Status: DISCONTINUED | OUTPATIENT
Start: 2019-12-25 | End: 2020-01-01

## 2019-12-25 RX ORDER — FERROUS SULFATE 325(65) MG
325 TABLET ORAL 2 TIMES DAILY
Status: DISCONTINUED | OUTPATIENT
Start: 2019-12-25 | End: 2019-12-25

## 2019-12-25 RX ORDER — SODIUM CHLORIDE 0.9 % (FLUSH) 0.9 %
10 SYRINGE (ML) INJECTION EVERY 12 HOURS SCHEDULED
Status: DISCONTINUED | OUTPATIENT
Start: 2019-12-25 | End: 2019-12-26 | Stop reason: SDUPTHER

## 2019-12-25 RX ORDER — FOLIC ACID 1 MG/1
1 TABLET ORAL DAILY
Status: DISCONTINUED | OUTPATIENT
Start: 2019-12-26 | End: 2019-12-25

## 2019-12-25 RX ORDER — ASPIRIN 81 MG/1
81 TABLET ORAL 2 TIMES DAILY
Status: DISCONTINUED | OUTPATIENT
Start: 2019-12-26 | End: 2019-12-25

## 2019-12-25 RX ORDER — OXYCODONE HCL 10 MG/1
10 TABLET, FILM COATED, EXTENDED RELEASE ORAL EVERY 12 HOURS
Status: ON HOLD | COMMUNITY
End: 2020-01-02 | Stop reason: SDUPTHER

## 2019-12-25 RX ORDER — PANTOPRAZOLE SODIUM 40 MG/1
40 TABLET, DELAYED RELEASE ORAL DAILY
Status: DISCONTINUED | OUTPATIENT
Start: 2019-12-26 | End: 2019-12-25

## 2019-12-25 RX ORDER — ONDANSETRON 4 MG/1
4 TABLET, ORALLY DISINTEGRATING ORAL EVERY 6 HOURS PRN
Status: DISCONTINUED | OUTPATIENT
Start: 2019-12-25 | End: 2020-01-03 | Stop reason: HOSPADM

## 2019-12-25 RX ORDER — POTASSIUM CHLORIDE 20 MEQ/1
40 TABLET, EXTENDED RELEASE ORAL PRN
Status: DISCONTINUED | OUTPATIENT
Start: 2019-12-25 | End: 2020-01-03 | Stop reason: HOSPADM

## 2019-12-25 RX ORDER — OXYCODONE HYDROCHLORIDE AND ACETAMINOPHEN 5; 325 MG/1; MG/1
1 TABLET ORAL EVERY 6 HOURS PRN
Status: ON HOLD | COMMUNITY
End: 2020-01-02 | Stop reason: SDUPTHER

## 2019-12-25 RX ORDER — PANTOPRAZOLE SODIUM 40 MG/10ML
40 INJECTION, POWDER, LYOPHILIZED, FOR SOLUTION INTRAVENOUS DAILY
Status: DISCONTINUED | OUTPATIENT
Start: 2019-12-26 | End: 2020-01-03 | Stop reason: HOSPADM

## 2019-12-25 RX ORDER — ASPIRIN 300 MG/1
300 SUPPOSITORY RECTAL ONCE
Status: COMPLETED | OUTPATIENT
Start: 2019-12-25 | End: 2019-12-25

## 2019-12-25 RX ORDER — METOPROLOL TARTRATE 50 MG/1
50 TABLET, FILM COATED ORAL 2 TIMES DAILY
Status: DISCONTINUED | OUTPATIENT
Start: 2019-12-25 | End: 2019-12-25

## 2019-12-25 RX ADMIN — DILTIAZEM HYDROCHLORIDE 10 MG/HR: 5 INJECTION INTRAVENOUS at 21:40

## 2019-12-25 RX ADMIN — ASPIRIN 300 MG: 300 SUPPOSITORY RECTAL at 15:38

## 2019-12-25 RX ADMIN — MICONAZOLE NITRATE: 20 POWDER TOPICAL at 22:59

## 2019-12-25 RX ADMIN — DILTIAZEM HYDROCHLORIDE 10 MG: 5 INJECTION INTRAVENOUS at 14:40

## 2019-12-25 RX ADMIN — SODIUM CHLORIDE 1000 ML: 9 INJECTION, SOLUTION INTRAVENOUS at 11:46

## 2019-12-25 RX ADMIN — DILTIAZEM HYDROCHLORIDE 5 MG/HR: 5 INJECTION INTRAVENOUS at 14:41

## 2019-12-25 RX ADMIN — MORPHINE SULFATE 4 MG: 4 INJECTION, SOLUTION INTRAMUSCULAR; INTRAVENOUS at 22:48

## 2019-12-25 RX ADMIN — Medication 10 ML: at 22:48

## 2019-12-25 ASSESSMENT — PAIN DESCRIPTION - ORIENTATION: ORIENTATION: RIGHT

## 2019-12-25 ASSESSMENT — PAIN DESCRIPTION - LOCATION: LOCATION: HIP

## 2019-12-25 ASSESSMENT — PAIN SCALES - GENERAL
PAINLEVEL_OUTOF10: 0
PAINLEVEL_OUTOF10: 10

## 2019-12-25 ASSESSMENT — PAIN DESCRIPTION - DESCRIPTORS: DESCRIPTORS: ACHING

## 2019-12-25 ASSESSMENT — PAIN - FUNCTIONAL ASSESSMENT: PAIN_FUNCTIONAL_ASSESSMENT: PREVENTS OR INTERFERES WITH ALL ACTIVE AND SOME PASSIVE ACTIVITIES

## 2019-12-25 ASSESSMENT — PAIN DESCRIPTION - PAIN TYPE: TYPE: CHRONIC PAIN

## 2019-12-25 ASSESSMENT — PAIN DESCRIPTION - PROGRESSION: CLINICAL_PROGRESSION: GRADUALLY WORSENING

## 2019-12-25 ASSESSMENT — PAIN DESCRIPTION - FREQUENCY: FREQUENCY: CONTINUOUS

## 2019-12-25 ASSESSMENT — PAIN SCALES - WONG BAKER
WONGBAKER_NUMERICALRESPONSE: 10
WONGBAKER_NUMERICALRESPONSE: 4
WONGBAKER_NUMERICALRESPONSE: 8

## 2019-12-25 ASSESSMENT — PAIN DESCRIPTION - ONSET: ONSET: ON-GOING

## 2019-12-25 NOTE — PROGRESS NOTES
Patient in room 97 [id number]. Report received from ED RN Laura Fortune 56. VSS. Cardizem gtt on 5mg/hr. Patient resting.

## 2019-12-25 NOTE — ED PROVIDER NOTES
I independently performed a history and physical on Sedrick Soto. All diagnostic, treatment, and disposition decisions were made by myself in conjunction with the advanced practice provider. Briefly, this is a 69 y.o. male here for AMS, associated with fever, sepsis. Recent admission her for hip fx of neck of right femur; surgery delayed secondary to bad candidiasis of groin making him a poor operative candidate    On exam, Awake, alert; garbled speech. Altered. Tachycardiac, irreg irreg. HOT to the touch. Abdomen sof,t NT ND; umbilical hernia noted. Lungs CTAB. Heart fast rate reg rhythm. Groin with candidiasis. Screenings            MDM  Septic workup; admit. Patient Referrals:  No follow-up provider specified. Discharge Medications:  New Prescriptions    No medications on file       FINAL IMPRESSION  1. Atrial fibrillation with RVR ([address]    2. Altered mental status, unspecified altered mental status type        Blood pressure (!) 111/44, pulse 115, temperature 99.5 °F (37.5 °C), temperature source Infrared, resp. rate 22, height 6' 1\" (1.854 m), weight 258 lb (117 kg), SpO2 97 %. For further details of [facility] emergency department encounter, please see documentation by advanced practice providerCharleen.        Isabelle Beckford MD  12/25/19 2207

## 2019-12-25 NOTE — ED PROVIDER NOTES
[facility]        Pt Name: Alix Kidd  MRN: [id number]  [place] 1945  Date of evaluation: 12/25/2019  Provider: GUADALUPE Jean Baptiste  PCP: Hadley Mclean MD    This patient was seen and evaluated by the attending physician Timo Walton MD.      [address] [place] [address]       Chief Complaint   Patient presents with    Altered Mental Status     pt transported from [place] [address] by [employer] for altered mental status. nurse report stated patient was awake at breakfast, he was hard to get awake at 0930  and she was unable to give patient his morning meds. pt hx right hip fracture 15 days ago, being treated for fungal infection in right hip.  pt is a DNRCCA       HISTORY OF PRESENT ILLNESS   (Location/Symptom, Timing/Onset, Context/Setting, Quality, Duration, Modifying Factors, Severity)  Note limiting factors. Alix Kidd is a 69 y.o. male with past medical history of atrial fibrillation on Xarelto, lymphoma, previous CVA, CHF, hypertension and obesity who presents to the ED with altered mental status. Patient was sent in by EMS from [place] [address]. Apparently was confused with baseline mental status and would not take medications this morning. Apparently significantly different from patient's baseline mental status and sent to the ED for further evaluation and treatment. Documented DNR CCA per nursing note and ECF. Patient has history of previous CVA with upon report residual right-sided weakness. Apparently fell 15 days ago and had right hip fracture. Seen by orthopedics upon admission and not a candidate for surgical pair at the time given extensive infection to the right anterior hip and skin. Discharged to ECF. ECF states altered mental status today so sent to the ED for further evaluation and treatment.   No documented cough, injury, fever/chills, new rashes/lesions, urinary symptoms or changes in (PRILOSEC) 20 MG DELAYED RELEASE CAPSULE    Take 20 mg by mouth daily    ONDANSETRON (ZOFRAN) 4 MG TABLET    Take 4 mg by mouth every 6 hours as needed for Nausea or Vomiting    OXYCODONE (OXYCONTIN) 10 MG EXTENDED RELEASE TABLET    Take 10 mg by mouth every 12 hours. OXYCODONE-ACETAMINOPHEN (PERCOCET) 5-325 MG PER TABLET    Take 1 tablet by mouth every 6 hours as needed for Pain. POTASSIUM CHLORIDE (MICRO-K) 10 MEQ EXTENDED RELEASE CAPSULE    Take 10 mEq by mouth daily    RIVAROXABAN (XARELTO) 20 MG TABS TABLET    TAKE 1 TABLET BY MOUTH DAILY    SPIRONOLACTONE (ALDACTONE) 25 MG TABLET    Take 0.5 tablets by mouth daily    TORSEMIDE (DEMADEX) 20 MG TABLET    Take 1 tablet by mouth daily    VITAMIN B-12 (CYANOCOBALAMIN) 100 MCG TABLET    Take 100 mcg by mouth daily         ALLERGIES     Patient has no known allergies. FAMILYHISTORY       Family History   Problem Relation Age of Onset    Alzheimer's Disease Mother           SOCIAL HISTORY       Social History     Tobacco Use    Smoking status: Former Smoker     Packs/day: 1.00     Last attempt to quit: 2011     Years since quittin.6    Smokeless tobacco: Never Used   Substance Use Topics    Alcohol use: No     Alcohol/week: 0.0 standard drinks    Drug use: No       SCREENINGS             PHYSICAL EXAM    (up to 7 for level 4, 8 or more for level 5)     ED Triage Vitals [19 1106]   BP Temp Temp Source Pulse Resp SpO2 Height Weight   (!) 139/50 99.5 °F (37.5 °C) Infrared 144 28 98 % 6' 1\" (1.854 m) 258 lb (117 kg)       Physical Exam  Constitutional:       General: He is not in acute distress. Appearance: Normal appearance. He is well-developed. He is not ill-appearing, toxic-appearing or diaphoretic. HENT:      Head: Normocephalic and atraumatic. Right Ear: External ear normal.      Left Ear: External ear normal.   Eyes:      General:         Right eye: No discharge. Left eye: No discharge.       Extraocular Movements: Extraocular movements intact. Pupils: Pupils are equal, round, and reactive to light. Neck:      Musculoskeletal: Normal range of motion and neck supple. No neck rigidity or muscular tenderness. Cardiovascular:      Rate and Rhythm: Regular rhythm. Tachycardia present. Pulses: Normal pulses. Heart sounds: Normal heart sounds. No murmur. No friction rub. No gallop. Pulmonary:      Effort: Pulmonary effort is normal. No respiratory distress. Breath sounds: Normal breath sounds. No stridor. No wheezing, rhonchi or rales. Chest:      Chest wall: No tenderness. Abdominal:      General: Abdomen is flat. Bowel sounds are normal. There is no distension. Palpations: Abdomen is soft. There is no mass. Tenderness: There is no tenderness. There is no right CVA tenderness, left CVA tenderness, guarding or rebound. Hernia: A hernia is present. Hernia is present in the umbilical area. Comments: Easily reducible umbilical hernia. No signs of incarceration or strangulation. No skin changes. Musculoskeletal: Normal range of motion. Comments: Patient does have what appears to be 10 palpation of the right hip. Does wiggle toes without difficulty. Dorsalis pedis pulse brisk. Does have what appears to be fungal infection with skin changes noted over the right groin and right hip. No induration or fluctuance. No evidence of secondary cellulitis. No evidence of abscess. Lymphadenopathy:      Cervical: No cervical adenopathy. Skin:     General: Skin is warm and dry. Coloration: Skin is not pale. Findings: No erythema or rash. Neurological:      Mental Status: He is alert. Comments: Patient moving upper extremities without difficulty. Patient has some garbled and mumbled speech. Which apparently is chronic for patient per report. Patient has what appears to be pain with palpation of the right hip and with attempted movement of the right lower extremity. Will wiggle toes. Patient appears confused at this time. Cranial nerves II through XII appear intact. Gait deferred. Psychiatric:         Behavior: Behavior normal.         DIAGNOSTIC RESULTS   LABS:    Labs Reviewed   CBC WITH AUTO DIFFERENTIAL - Abnormal; Notable for the following components:       Result Value    RBC 3.80 (*)     Hemoglobin 12.1 (*)     Hematocrit 37.2 (*)     RDW 16.4 (*)     Neutrophils Absolute 9.6 (*)     Lymphocytes Absolute 0.2 (*)     All other components within normal limits    Narrative:     Performed at:  [facility] [address], [place], [address] [employer]   Phone [phone]   COMPREHENSIVE METABOLIC PANEL W/ REFLEX TO MG FOR LOW K - Abnormal; Notable for the following components:    Glucose 128 (*)     CREATININE 0.5 (*)     Alb 3.1 (*)     Albumin/Globulin Ratio 0.8 (*)     Total Bilirubin 1.3 (*)     Alkaline Phosphatase 156 (*)     All other components within normal limits    Narrative:     Performed at:  [facility] [address], [place], [address] [employer]   Phone [phone]   PROTIME-INR - Abnormal; Notable for the following components:    Protime 25.3 (*)     INR 2.16 (*)     All other components within normal limits    Narrative:     Performed at:  [facility] [address], [place], [address] [employer]   Phone [phone]   URINE RT REFLEX TO CULTURE - Abnormal; Notable for the following components:    Clarity, UA CLOUDY (*)     All other components within normal limits    Narrative:     Performed at:  [facility] [address], [place], [address] [employer]   Phone [phone]   CK - Abnormal; Notable for the following components:     Total CK 33 (*)     All other components within normal limits    Narrative:     Performed at:  [facility] [address],  [place] [address] [employer]   Phone [phone]   TROPONIN - 12/25/19 1130 12/25/19 1200 12/25/19 1232   BP: (!) 139/50 116/63 (!) 124/50 (!) 111/44   Pulse: 144 128 126 115   Resp: 28 19 26 22   Temp: 99.5 °F (37.5 °C)      TempSrc: Infrared      SpO2: 98% 98% 93% 97%   Weight: 258 lb (117 kg)      Height: 6' 1\" (1.854 m)          Patient was given thefollowing medications:  Medications   diltiazem injection 10 mg (has no administration in time range)     Followed by   diltiazem 125 mg in dextrose 5 % 125 mL infusion (has no administration in time range)   aspirin suppository 300 mg (has no administration in time range)   0.9 % sodium chloride bolus (0 mLs Intravenous Stopped 12/25/19 1350)       Patient is a 25-year-old male who presents the implant of altered mental status. Sent in with altered mental status. Patient has history of known right hip fracture not currently candidate for surgery given significant fungal infection overlying the right hip. Patient had altered mental status per facility sent to the ED for further evaluation treatment. Upon examination patient has garbled speech which apparently is chronic. Has residual right-sided deficits from previous CVA and does appear to be slightly altered at this time. Unsure of patient's baseline mental status but work-up initiated here in the emergency department. Patient noted to be tachycardic and EKG showed A. fib with RVR. No further EKG interpretation please see attending provider note. Urinalysis unremarkable. CBC showed normal white count and platelets. Hemoglobin 12.1. CMP unremarkable. INR 2.16. Lactic acid normal.  Blood cultures pending. CK 33. Troponin 0 0.03. BNP 2300. Chest x-ray showed what appeared to be some pulmonary vascular congestion but otherwise unremarkable. CT the head unremarkable. Patient given small bolus of fluid here in the ED. Patient's heart rate remains in the 1 teens and started on Cardizem bolus and drip. Given aspirin given elevated troponin.   Patient likely some

## 2019-12-25 NOTE — H&P
HOSPITALISTS HISTORY AND PHYSICAL    12/25/2019 3:30 PM    Patient Information:  Maureen Stephens is a 69 y.o. male [id number]  PCP:  Ashley Gray MD (Tel: [phone] )    Chief complaint:    Chief Complaint   Patient presents with    Altered Mental Status     pt transported from [place] [address] by [facility] for altered mental status. nurse report stated patient was awake at breakfast, he was hard to get awake at 0930  and she was unable to give patient his morning meds. pt hx right hip fracture 15 days ago, being treated for fungal infection in right hip.  pt is a DNRCCA        History of Present Illness:  Santosh Urbano is a 69 y.o. male with past medical history of atrial fibrillation on Xarelto, lymphoma, previous CVA, CHF, hypertension and obesity who presents to the ED with altered mental status. Patient was sent in by EMS from [place] [address]. Apparently was confused with baseline mental status and would not take medications this morning. Apparently significantly different from patient's baseline mental status and sent to the ED for further evaluation and treatment. Documented DNR CCA per nursing note and ECF. Patient has history of previous CVA with upon report residual right-sided weakness. Apparently fell 15 days ago and had right hip fracture. Seen by orthopedics upon admission and not a candidate for surgical pair at the time given extensive infection to the right anterior hip and skin. Discharged to ECF. ECF states altered mental status today so sent to the ED for further evaluation and treatment. No documented cough, injury, fever/chills, new rashes/lesions, urinary symptoms or changes in bowel movements. No documented decreased oral intake, nausea/vomiting or difficulty breathing.     The ER he was noted to atrial fibrillation with rapid ventricular rate started (CYANOCOBALAMIN) 100 MCG tablet Take 100 mcg by mouth daily      folic acid (FOLVITE) 1 MG tablet Take 1 mg by mouth daily      atorvastatin (LIPITOR) 20 MG tablet TAKE 1 TABLET BY MOUTH DAILY 90 tablet 3    Emollient (DERMAPHOR) OINT ointment APPLY TOPICALLY TWICE DAILY AS NEEDED (Patient taking differently: APPLY TOPICALLY TWICE DAILY AS NEEDED FOR SKIN DRYNESS) 453.9 g 0    rivaroxaban (XARELTO) 20 MG TABS tablet TAKE 1 TABLET BY MOUTH DAILY 30 tablet 11    metoprolol succinate (TOPROL XL) 25 MG extended release tablet TAKE 1 TABLET BY MOUTH DAILY 90 tablet 3    ferrous sulfate (BEST-GLORIA) 325 (65 Fe) MG tablet Take 1 tablet by mouth 2 times daily 60 tablet 11    acetaminophen (TYLENOL) 325 MG tablet Take 650 mg by mouth every 6 hours as needed for Pain       miconazole (MICOTIN) 2 % powder Apply topically 2 times daily. 45 g 1       Allergies:  No Known Allergies     Social History:     reports that he quit smoking about 8 years ago. He smoked 1.00 pack per day. He has never used smokeless tobacco. He reports that he does not drink alcohol or use drugs. Family History:  family history includes Alzheimer's Disease in his mother. ,    Physical Exam:  BP (!) 102/53   Pulse 99   Temp 99.5 °F (37.5 °C) (Infrared)   Resp 22   Ht 6' 1\" (1.854 m)   Wt 258 lb (117 kg)   SpO2 100%   BMI 34.04 kg/m²     General appearance:  Appears comfortable. Well nourished  Eyes: Sclera clear, pupils equal  ENT: Moist mucus membranes, no thrush. Trachea midline.   Cardiovascular: Irregular irregular heart RATE  Respiratory: Clear to auscultation bilaterally, no wheeze, good inspiratory effort  Gastrointestinal: Abdomen soft, non-tender, not distended, normal bowel sounds  Musculoskeletal: No cyanosis in digits, neck supple  Neurology: Confused and disoriented  Skin: Warm, dry, normal turgor, no rash  Brisk capillary refill, peripheral pulses palpable   Labs:  CBC:   Lab Results   Component Value Date    WBC 10.5 12/25/2019    RBC 3.80 12/25/2019    RBC 3.84 06/01/2017    HGB 12.1 12/25/2019    HCT 37.2 12/25/2019    MCV 98.0 12/25/2019    MCH 31.9 12/25/2019    MCHC 32.6 12/25/2019    RDW 16.4 12/25/2019     12/25/2019    MPV 8.4 12/25/2019     BMP:    Lab Results   Component Value Date     12/25/2019    K 3.6 12/25/2019     12/25/2019    CO2 25 12/25/2019    BUN 13 12/25/2019    CREATININE 0.5 12/25/2019    CALCIUM 8.8 12/25/2019    GFRAA >60 12/25/2019    LABGLOM >60 12/25/2019    GLUCOSE 128 12/25/2019    GLUCOSE 114 06/01/2017     CT Head WO Contrast   Final Result   No acute intracranial abnormality. XR CHEST PORTABLE   Final Result   Cardiomegaly with mild interstitial edema, small pleural effusions, and   bibasilar atelectasis              EKG: ATRIAL Fibrillation with rapid ventricular rate. Problem List  Active Problems:    AMS (altered mental status)  Resolved Problems:    * No resolved hospital problems.  *        Assessment/Plan:   History of chronic atrial fibrillation is atrial fibrillation with rapid ventricular rate new Lopressor 50 mg p.o. twice daily Cardizem drip tapered and stopped continue Xarelto cardiology consult    History of CVA presents altered mental status cause not obvious patient is on multiple pain medications this is been stopped currently responding to verbal commands CT of the head negative neurology consult UA negative    DO No suspect any kind of infection no leukocytosis lactic acid normal blood culture obtained has indwelling port    Right hip fracture not a candidate for surgery secondary to fungal infection in the right groin on miconazole continue    See orders    DVT prophylaxis Xarelto  Code status DNR CCA  Diet As Per Adrian Pérez MD    12/25/2019 3:30 PM

## 2019-12-26 ENCOUNTER — APPOINTMENT (OUTPATIENT)
Dept: MRI IMAGING | Age: 74
DRG: 314 | End: 2019-12-26
Payer: MEDICARE

## 2019-12-26 ENCOUNTER — APPOINTMENT (OUTPATIENT)
Dept: CT IMAGING | Age: 74
DRG: 314 | End: 2019-12-26
Payer: MEDICARE

## 2019-12-26 LAB
A/G RATIO: 0.6 (ref 1.1–2.2)
ALBUMIN SERPL-MCNC: 2.7 G/DL (ref 3.4–5)
ALP BLD-CCNC: 136 U/L (ref 40–129)
ALT SERPL-CCNC: 19 U/L (ref 10–40)
AMMONIA: 53 UMOL/L (ref 16–60)
ANION GAP SERPL CALCULATED.3IONS-SCNC: 14 MMOL/L (ref 3–16)
AST SERPL-CCNC: 34 U/L (ref 15–37)
BILIRUB SERPL-MCNC: 1.3 MG/DL (ref 0–1)
BUN BLDV-MCNC: 14 MG/DL (ref 7–20)
CALCIUM SERPL-MCNC: 8.7 MG/DL (ref 8.3–10.6)
CHLORIDE BLD-SCNC: 104 MMOL/L (ref 99–110)
CO2: 22 MMOL/L (ref 21–32)
CREAT SERPL-MCNC: 0.6 MG/DL (ref 0.8–1.3)
EKG ATRIAL RATE: 94 BPM
EKG DIAGNOSIS: NORMAL
EKG Q-T INTERVAL: 320 MS
EKG QRS DURATION: 90 MS
EKG QTC CALCULATION (BAZETT): 446 MS
EKG R AXIS: 90 DEGREES
EKG T AXIS: -14 DEGREES
EKG VENTRICULAR RATE: 117 BPM
GFR AFRICAN AMERICAN: >60
GFR NON-AFRICAN AMERICAN: >60
GLOBULIN: 4.5 G/DL
GLUCOSE BLD-MCNC: 156 MG/DL (ref 70–99)
HCT VFR BLD CALC: 38.3 % (ref 40.5–52.5)
HEMOGLOBIN: 12.5 G/DL (ref 13.5–17.5)
LACTIC ACID, SEPSIS: 1.9 MMOL/L (ref 0.4–1.9)
LACTIC ACID, SEPSIS: 3.4 MMOL/L (ref 0.4–1.9)
MCH RBC QN AUTO: 32.3 PG (ref 26–34)
MCHC RBC AUTO-ENTMCNC: 32.6 G/DL (ref 31–36)
MCV RBC AUTO: 99.2 FL (ref 80–100)
PDW BLD-RTO: 17 % (ref 12.4–15.4)
PLATELET # BLD: 164 K/UL (ref 135–450)
PMV BLD AUTO: 8.5 FL (ref 5–10.5)
POTASSIUM REFLEX MAGNESIUM: 4 MMOL/L (ref 3.5–5.1)
PROCALCITONIN: 0.43 NG/ML (ref 0–0.15)
RBC # BLD: 3.86 M/UL (ref 4.2–5.9)
REPORT: NORMAL
SODIUM BLD-SCNC: 140 MMOL/L (ref 136–145)
TOTAL PROTEIN: 7.2 G/DL (ref 6.4–8.2)
WBC # BLD: 6.3 K/UL (ref 4–11)

## 2019-12-26 PROCEDURE — 92526 ORAL FUNCTION THERAPY: CPT

## 2019-12-26 PROCEDURE — 99223 1ST HOSP IP/OBS HIGH 75: CPT | Performed by: PSYCHIATRY & NEUROLOGY

## 2019-12-26 PROCEDURE — 6360000002 HC RX W HCPCS: Performed by: INTERNAL MEDICINE

## 2019-12-26 PROCEDURE — 2500000003 HC RX 250 WO HCPCS: Performed by: INTERNAL MEDICINE

## 2019-12-26 PROCEDURE — 2580000003 HC RX 258: Performed by: NURSE PRACTITIONER

## 2019-12-26 PROCEDURE — 85027 COMPLETE CBC AUTOMATED: CPT

## 2019-12-26 PROCEDURE — 83605 ASSAY OF LACTIC ACID: CPT

## 2019-12-26 PROCEDURE — 84145 PROCALCITONIN (PCT): CPT

## 2019-12-26 PROCEDURE — 82140 ASSAY OF AMMONIA: CPT

## 2019-12-26 PROCEDURE — 6360000002 HC RX W HCPCS: Performed by: NURSE PRACTITIONER

## 2019-12-26 PROCEDURE — 2060000000 HC ICU INTERMEDIATE R&B

## 2019-12-26 PROCEDURE — 36415 COLL VENOUS BLD VENIPUNCTURE: CPT

## 2019-12-26 PROCEDURE — 70553 MRI BRAIN STEM W/O & W/DYE: CPT

## 2019-12-26 PROCEDURE — 6370000000 HC RX 637 (ALT 250 FOR IP): Performed by: INTERNAL MEDICINE

## 2019-12-26 PROCEDURE — C9113 INJ PANTOPRAZOLE SODIUM, VIA: HCPCS | Performed by: NURSE PRACTITIONER

## 2019-12-26 PROCEDURE — 2580000003 HC RX 258: Performed by: INTERNAL MEDICINE

## 2019-12-26 PROCEDURE — 6360000004 HC RX CONTRAST MEDICATION: Performed by: PSYCHIATRY & NEUROLOGY

## 2019-12-26 PROCEDURE — 93971 EXTREMITY STUDY: CPT

## 2019-12-26 PROCEDURE — 99223 1ST HOSP IP/OBS HIGH 75: CPT | Performed by: INTERNAL MEDICINE

## 2019-12-26 PROCEDURE — A9577 INJ MULTIHANCE: HCPCS | Performed by: PSYCHIATRY & NEUROLOGY

## 2019-12-26 PROCEDURE — 92610 EVALUATE SWALLOWING FUNCTION: CPT

## 2019-12-26 PROCEDURE — 6370000000 HC RX 637 (ALT 250 FOR IP): Performed by: NURSE PRACTITIONER

## 2019-12-26 PROCEDURE — 93010 ELECTROCARDIOGRAM REPORT: CPT | Performed by: INTERNAL MEDICINE

## 2019-12-26 PROCEDURE — 74176 CT ABD & PELVIS W/O CONTRAST: CPT

## 2019-12-26 PROCEDURE — 80053 COMPREHEN METABOLIC PANEL: CPT

## 2019-12-26 RX ORDER — SODIUM CHLORIDE 0.9 % (FLUSH) 0.9 %
10 SYRINGE (ML) INJECTION PRN
Status: DISCONTINUED | OUTPATIENT
Start: 2019-12-26 | End: 2020-01-03 | Stop reason: HOSPADM

## 2019-12-26 RX ORDER — ACETAMINOPHEN 650 MG/1
650 SUPPOSITORY RECTAL EVERY 6 HOURS PRN
Status: DISCONTINUED | OUTPATIENT
Start: 2019-12-26 | End: 2020-01-03 | Stop reason: HOSPADM

## 2019-12-26 RX ORDER — METOPROLOL SUCCINATE 25 MG/1
25 TABLET, EXTENDED RELEASE ORAL DAILY
Status: DISCONTINUED | OUTPATIENT
Start: 2019-12-26 | End: 2019-12-26 | Stop reason: ALTCHOICE

## 2019-12-26 RX ORDER — SODIUM CHLORIDE 0.9 % (FLUSH) 0.9 %
10 SYRINGE (ML) INJECTION EVERY 12 HOURS SCHEDULED
Status: DISCONTINUED | OUTPATIENT
Start: 2019-12-26 | End: 2020-01-02

## 2019-12-26 RX ORDER — SODIUM CHLORIDE 0.9 % (FLUSH) 0.9 %
20 SYRINGE (ML) INJECTION
Status: DISCONTINUED | OUTPATIENT
Start: 2019-12-26 | End: 2020-01-03 | Stop reason: HOSPADM

## 2019-12-26 RX ORDER — SODIUM CHLORIDE 0.9 % (FLUSH) 0.9 %
10 SYRINGE (ML) INJECTION EVERY 12 HOURS SCHEDULED
Status: DISCONTINUED | OUTPATIENT
Start: 2019-12-26 | End: 2020-01-03 | Stop reason: HOSPADM

## 2019-12-26 RX ORDER — ATORVASTATIN CALCIUM 20 MG/1
20 TABLET, FILM COATED ORAL NIGHTLY
Status: DISCONTINUED | OUTPATIENT
Start: 2019-12-26 | End: 2020-01-03 | Stop reason: HOSPADM

## 2019-12-26 RX ORDER — LIDOCAINE HYDROCHLORIDE 10 MG/ML
5 INJECTION, SOLUTION EPIDURAL; INFILTRATION; INTRACAUDAL; PERINEURAL ONCE
Status: DISCONTINUED | OUTPATIENT
Start: 2019-12-26 | End: 2020-01-03 | Stop reason: HOSPADM

## 2019-12-26 RX ORDER — ASPIRIN 81 MG/1
81 TABLET, CHEWABLE ORAL DAILY
Status: DISCONTINUED | OUTPATIENT
Start: 2019-12-26 | End: 2020-01-03 | Stop reason: HOSPADM

## 2019-12-26 RX ORDER — SODIUM CHLORIDE 9 MG/ML
INJECTION, SOLUTION INTRAVENOUS CONTINUOUS
Status: DISCONTINUED | OUTPATIENT
Start: 2019-12-26 | End: 2019-12-29

## 2019-12-26 RX ADMIN — ASPIRIN 81 MG 81 MG: 81 TABLET ORAL at 11:56

## 2019-12-26 RX ADMIN — MICONAZOLE NITRATE: 20 POWDER TOPICAL at 21:44

## 2019-12-26 RX ADMIN — ENOXAPARIN SODIUM 120 MG: 120 INJECTION SUBCUTANEOUS at 11:49

## 2019-12-26 RX ADMIN — MORPHINE SULFATE 4 MG: 4 INJECTION, SOLUTION INTRAMUSCULAR; INTRAVENOUS at 05:20

## 2019-12-26 RX ADMIN — GADOBENATE DIMEGLUMINE 20 ML: 529 INJECTION, SOLUTION INTRAVENOUS at 15:20

## 2019-12-26 RX ADMIN — Medication 10 ML: at 11:57

## 2019-12-26 RX ADMIN — ACETAMINOPHEN 650 MG: 650 SUPPOSITORY RECTAL at 05:50

## 2019-12-26 RX ADMIN — Medication 20 ML: at 15:21

## 2019-12-26 RX ADMIN — MICONAZOLE NITRATE: 20 POWDER TOPICAL at 11:56

## 2019-12-26 RX ADMIN — PANTOPRAZOLE SODIUM 40 MG: 40 INJECTION, POWDER, FOR SOLUTION INTRAVENOUS at 11:53

## 2019-12-26 RX ADMIN — ACETAMINOPHEN 650 MG: 325 TABLET, FILM COATED ORAL at 16:31

## 2019-12-26 RX ADMIN — ATORVASTATIN CALCIUM 20 MG: 20 TABLET, FILM COATED ORAL at 21:44

## 2019-12-26 RX ADMIN — SODIUM CHLORIDE: 9 INJECTION, SOLUTION INTRAVENOUS at 22:38

## 2019-12-26 RX ADMIN — MORPHINE SULFATE 4 MG: 4 INJECTION, SOLUTION INTRAMUSCULAR; INTRAVENOUS at 11:51

## 2019-12-26 RX ADMIN — METOPROLOL TARTRATE 25 MG: 25 TABLET, FILM COATED ORAL at 11:56

## 2019-12-26 RX ADMIN — MORPHINE SULFATE 4 MG: 4 INJECTION, SOLUTION INTRAMUSCULAR; INTRAVENOUS at 16:52

## 2019-12-26 RX ADMIN — ACETAMINOPHEN 650 MG: 650 SUPPOSITORY RECTAL at 12:01

## 2019-12-26 RX ADMIN — METOPROLOL TARTRATE 25 MG: 25 TABLET, FILM COATED ORAL at 21:44

## 2019-12-26 RX ADMIN — SODIUM CHLORIDE: 9 INJECTION, SOLUTION INTRAVENOUS at 16:29

## 2019-12-26 RX ADMIN — VANCOMYCIN HYDROCHLORIDE 1250 MG: 10 INJECTION, POWDER, LYOPHILIZED, FOR SOLUTION INTRAVENOUS at 12:04

## 2019-12-26 ASSESSMENT — PAIN SCALES - GENERAL
PAINLEVEL_OUTOF10: 10
PAINLEVEL_OUTOF10: 10
PAINLEVEL_OUTOF10: 8
PAINLEVEL_OUTOF10: 6
PAINLEVEL_OUTOF10: 8
PAINLEVEL_OUTOF10: 10

## 2019-12-26 ASSESSMENT — PAIN SCALES - WONG BAKER
WONGBAKER_NUMERICALRESPONSE: 4
WONGBAKER_NUMERICALRESPONSE: 0
WONGBAKER_NUMERICALRESPONSE: 10
WONGBAKER_NUMERICALRESPONSE: 6
WONGBAKER_NUMERICALRESPONSE: 10
WONGBAKER_NUMERICALRESPONSE: 10

## 2019-12-26 NOTE — PROGRESS NOTES
[facility]    12/26/2019 1:07 PM        Name: Janeth Barker . Admitted: 12/25/2019  Primary Care Provider: Kimberly Layton MD (Tel: [phone])      Hospital Course:pt transported from [place] [address] by [facility] for altered mental status. nurse report stated patient was awake at breakfast, he was hard to get awake at 0930  and she was unable to give patient his morning meds. pt hx right hip fracture 15 days ago, being treated for fungal infection in right him, no hip surgery secondary to fungal infection.   pt is a DNRCCA      Subjective:  More lethargic, febrile, responds to pain    Reviewed interval ancillary notes    Current Medications  acetaminophen (TYLENOL) suppository 650 mg, Q6H PRN  enoxaparin (LOVENOX) injection 120 mg, BID  aspirin chewable tablet 81 mg, Daily  atorvastatin (LIPITOR) tablet 20 mg, Nightly  metoprolol tartrate (LOPRESSOR) tablet 25 mg, BID  vancomycin (VANCOCIN) 1,250 mg in dextrose 5 % 250 mL IVPB, Q12H  miconazole (MICOTIN) 2 % powder, BID  acetaminophen (TYLENOL) tablet 650 mg, Q6H PRN  ondansetron (ZOFRAN-ODT) disintegrating tablet 4 mg, Q6H PRN  sodium chloride flush 0.9 % injection 10 mL, 2 times per day  sodium chloride flush 0.9 % injection 10 mL, PRN  magnesium hydroxide (MILK OF MAGNESIA) 400 MG/5ML suspension 30 mL, Daily PRN  ondansetron (ZOFRAN) injection 4 mg, Q6H PRN  morphine injection 4 mg, Q4H PRN  pantoprazole (PROTONIX) injection 40 mg, Daily  potassium chloride (KLOR-CON M) extended release tablet 40 mEq, PRN    Or  potassium bicarb-citric acid (EFFER-K) effervescent tablet 40 mEq, PRN    Or  potassium chloride 10 mEq/100 mL IVPB (Peripheral Line), PRN        Objective:  /69   Pulse 107   Temp 103.1 °F (39.5 °C) (Axillary)   Resp 20   Ht 6' 1\" (1.854 m)   Wt 226 lb 12.8 oz (102.9 kg)   SpO2 98%   BMI 29.92 kg/m² with rapid ventricular rate new Lopressor 50 mg p.o. twice daily Cardizem drip tapered and stopped continue Xarelto and Toprol-XL cardiology consult    History of CVA presents altered mental status cause not obvious patient is on multiple pain medications this is been stopped currently responding to verbal commands CT of the head negative neurology consult UA negative    Right hip fracture not a candidate for surgery secondary to fungal infection in the right groin on miconazole continue     Diet: DIET DYSPHAGIA PUREED; Mildly Thick (Nectar);  No Drinking Straw  Code:DNR-CCA  DVT PPX Lovenox  [id number] inpatient    [facility], APRN - CNP   12/26/2019 1:07 PM

## 2019-12-26 NOTE — CONSULTS
Use    Smoking status: Former Smoker     Packs/day: 1.00     Last attempt to quit: 2011     Years since quittin.6    Smokeless tobacco: Never Used   Substance and Sexual Activity    Alcohol use: No     Alcohol/week: 0.0 standard drinks    Drug use: No    Sexual activity: Never   Lifestyle    Physical activity:     Days per week: None     Minutes per session: None    Stress: None   Relationships    Social connections:     Talks on phone: None     Gets together: None     Attends Baptism service: None     Active member of club or organization: None     Attends meetings of clubs or organizations: None     Relationship status: None    Intimate partner violence:     Fear of current or ex partner: None     Emotionally abused: None     Physically abused: None     Forced sexual activity: None   Other Topics Concern    None   Social History Narrative    None     Current Facility-Administered Medications   Medication Dose Route Frequency Provider Last Rate Last Dose    acetaminophen (TYLENOL) suppository 650 mg  650 mg Rectal Q6H PRN KADY Colby CNP   650 mg at 19 0550    enoxaparin (LOVENOX) injection 120 mg  1 mg/kg Subcutaneous BID KADY Colby CNP        aspirin chewable tablet 81 mg  81 mg Oral Daily Ravi Munguia MD        atorvastatin (LIPITOR) tablet 20 mg  20 mg Oral Nightly Ravi Munguia MD        metoprolol tartrate (LOPRESSOR) tablet 25 mg  25 mg Oral BID Ravi Munguia MD        vancomycin (VANCOCIN) 1,250 mg in dextrose 5 % 250 mL IVPB  1,250 mg Intravenous Q12H Karina Tineo MD        miconazole (MICOTIN) 2 % powder   Topical BID Rosendo Shields MD        acetaminophen (TYLENOL) tablet 650 mg  650 mg Oral Q6H PRN Rosendo Chambers MD        ondansetron (ZOFRAN-ODT) disintegrating tablet 4 mg  4 mg Oral Q6H PRN Rosendo Chambers MD        sodium chloride flush 0.9 % injection 10 mL  10 mL Intravenous 2 times per day Jama Nash MD   10 mL at Patient moves all 4 extremities but there is some mild weakness in his right arm with a strength of 4/5 and the left being 4+/5. Both lower extremities are 3/5. Tone was slightly diminished. Sensory: Withdraws to painful stimuli but could not cooperate for detailed sensory testing  Coordination:   Could not cooperate for testing            Reflexes: 1 in the upper extremities and barely elicitable in the lower extremities  Plantar response:  Withdrawal response bilaterally  Gait: Unable to ambulate at this time  Romberg: Could not be tested  Vascular: No carotid bruit bilaterally        DATA:  LABS:  General Labs:    CBC:   Lab Results   Component Value Date    WBC 6.3 12/26/2019    RBC 3.86 12/26/2019    RBC 3.84 06/01/2017    HGB 12.5 12/26/2019    HCT 38.3 12/26/2019    MCV 99.2 12/26/2019    MCH 32.3 12/26/2019    MCHC 32.6 12/26/2019    RDW 17.0 12/26/2019     12/26/2019    MPV 8.5 12/26/2019     BMP:    Lab Results   Component Value Date     12/26/2019    K 4.0 12/26/2019     12/26/2019    CO2 22 12/26/2019    BUN 14 12/26/2019    LABALBU 2.7 12/26/2019    CREATININE 0.6 12/26/2019    CALCIUM 8.7 12/26/2019    GFRAA >60 12/26/2019    LABGLOM >60 12/26/2019    GLUCOSE 156 12/26/2019    GLUCOSE 114 06/01/2017     RADIOLOGY REVIEW:  I have reviewed radiology image(s) and reports(s) of: CT scan of the head    IMPRESSION :  Acute encephalopathy probably due to sepsis  Blood cultures are suggesting septicemia with MRSA  Previous left hemisphere stroke with mild right hemiparesis and significant dysarthria and aphasia  Atrial fibrillation on Xarelto  Patient Active Problem List   Diagnosis    Anasarca    Cerebral infarction ([address]    Hypokalemia    Morbid obesity ([address]    CHF (congestive heart failure) (HCC)    Abnormal stress test    Chronic atrial fibrillation    Acute on chronic diastolic HF (heart failure) (HCC)    Bradycardia    Diffuse large B-cell lymphoma ([address]    Debility   

## 2019-12-26 NOTE — PROGRESS NOTES
Cardizem gtt titrated down the stopped. Temp currently 103. 1. Tylenol suppository given    Lovenox  Given to replace Xarelto. Patient in Pain. Morphine given    Vancomycin infusing    Diet advanced Puree    PO meds were given. Patient tolerates new diet well. Family updated on care.

## 2019-12-26 NOTE — CONSULTS
JOINT REPLACEMENT Right 2010    right TKA Knee via Right    KNEE SURGERY      TUNNELED VENOUS PORT PLACEMENT Left 2016    DR. Carlene Gill POWER PORT       Current Medications:    No outpatient medications have been marked as taking for the 19 encounter [facility] Encounter). Allergies:  Patient has no known allergies. Immunizations :   Immunization History   Administered Date(s) Administered    Pneumococcal Conjugate 13-valent ([id number]) 2016    Pneumococcal Polysaccharide ([id number]) 2018    Tdap (Boostrix, Adacel) 2019         Social History:    Social History     Tobacco Use    Smoking status: Former Smoker     Packs/day: 1.00     Last attempt to quit: 2011     Years since quittin.6    Smokeless tobacco: Never Used   Substance Use Topics    Alcohol use: No     Alcohol/week: 0.0 standard drinks    Drug use: No     Social History     Tobacco Use   Smoking Status Former Smoker    Packs/day: 1.00    Last attempt to quit: 2011    Years since quittin.6   Smokeless Tobacco Never Used      Family History   Problem Relation Age of Onset    Alzheimer's Disease Mother           REVIEW OF SYSTEMS:    No fever / chills / sweats. No weight loss. No visual change, eye pain, eye discharge. No oral lesion, sore throat, dysphagia. Denies cough / sputum/Sob   Denies chest pain, palpitations/ dizziness  Denies nausea/ vomiting/abdominal pain/diarrhea. Denies dysuria or change in urinary function. Denies joint swelling or pain. No myalgia, arthralgia. No rashes, skin lesions   Denies focal weakness, sensory change or other neurologic symptoms  No lymph node swelling or tenderness.     ROS limited due to pt factors     PHYSICAL EXAM:      Vitals:  T max  102.4   BP (!) 98/57   Pulse 85   Temp 100.8 °F (38.2 °C) (Axillary)   Resp 20   Ht 6' 1\" (1.854 m)   Wt 226 lb 12.8 oz (102.9 kg)   SpO2 100%   BMI 29.92 kg/m²     General Result   Cardiomegaly with mild interstitial edema, small pleural effusions, and   bibasilar atelectasis         MRI BRAIN W WO CONTRAST    (Results Pending)       All pertinent images and reports for the current Hospitalization were reviewed by me. 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)    Venous stasis dermatitis of both lower extremities    Patient nonadherence    Right hemiparesis (HCC)    Flexion contracture of right knee    Frequent falls    Pleural effusion    Acute respiratory failure with hypoxia ([facility])    Chest wall pain    Hip fracture requiring operative repair, right, closed, initial encounter ([address]    Closed fracture of neck of right femur ([address]    Fall at home, initial encounter    Fungal rash of torso    Acute on chronic respiratory failure with hypercapnia (HCC)    Pleural effusion, bilateral    Pulmonary hypertension ([address]    AMS (altered mental status)     Sepsis  High fevers  MRSA bacteremia  CVA history  Rt lino plegia  Rt hip fracture recent non operative due to poor functional status  Diffuse large B cell lymphoma  Chest port in place  Hep C+  Cirrhosis of liver  COPD  Severe Fungal dermatitis of the groin   Afib  Rt tka no seeding at this time     Given the presentation with high grade bacteremia and chest port cx +Ve suspect Port is infected will need removal and IR consulted      Labs, Microbiology, Radiology and pertinent results from current hospitalization and care every where were reviewed by me as a part of the consultation.     PLAN :  1. Cont IV Vancomycin x 1250 mg

## 2019-12-26 NOTE — PROGRESS NOTES
Assessment completed. Pt moved to room 3379 and placed on specialty bed. Messages sent to NP regarding pt's temp and medications. Changes made. Suppository ordered. Will continue to monitor.

## 2019-12-26 NOTE — PROGRESS NOTES
Patient is positive for MRSA in the Blood. Patient was previously positive for MRSA in the nares. Patient is already in isolation for contact and droplet precautions. Will notify attending.

## 2019-12-26 NOTE — PLAN OF CARE
*IV Pain medicine added to care plan. *Pt NPO failed swallow screen. All oral meds held including Xarelto and Metoprolol. Attending aware. Speech therapy evaluation ordered  *Chou cath inserted. Patient is incontinent and has chronic yeast. Fungal powder applied. *Unable to find pedal pulse in right foot. Used Doppler. Attending notified. Patient right thigh is warm. Venous doppler study ordered. *Cardizem gtt 10 mg. -120. Attending notified. Cardizem gtt to be discontinued if HR less than 90. Patient not able to take oral cardizem. Will continue to monitor      Problem: Pain:  Goal: Pain level will decrease  Description  Pain level will decrease  Outcome: Ongoing  Note:   Patient Faces rates pain 10/10. Morphine given. Patient is more relaxed and comfortable. Patient is repositioned every two hours. Problem: Risk for Impaired Skin Integrity  Goal: Tissue integrity - skin and mucous membranes  Description  Structural intactness and normal physiological function of skin and  mucous membranes. Outcome: Ongoing  Note:   Patient has red excoriation between thighs in groin area. Kesha care performed. Chou cath placed. Fungal powder applied. Problem: Falls - Risk of:  Goal: Will remain free from falls  Description  Will remain free from falls  Outcome: Ongoing  Note:   Patient has camera in the room. Bed alarm on. Hourly rounds performed.

## 2019-12-26 NOTE — PROGRESS NOTES
swallow evaluation only completed, will complete speech-language evaluation as able. Treatment Diagnosis: Moderate Oropharyngeal Dysphagia     Impressions: Pt was seen sitting upright in bed, he was intermittently responsive to basic questions however speech was largely unintelligible (? Baseline from prior CVA). Pt intermittently followed commands to assess oral motor function. Per chart review, Pt is on a regular texture diet with thin liquids at [facility]. Various textures were provided to assess swallowing function. Pt presents with moderate oropharyngeal dysphagia characterized by prolonged/reduced mastication, reduced bolus control, suspected premature bolus loss to pharynx, delayed swallow initiation, reduced laryngeal elevation upon manual palpation, and clinical s/s of aspiration/penetration with thin liquids. Thin liquids via cup revealed rapid bolus loss to pharynx with delayed swallow initiation, immediate coughing was noted on ~50% of trials, suspect due to possible aspiration/penetration. Improved bolus control with more timely swallow initiation was noted with nectar thick liquids. No overt clinical s/s of aspiration/penetration were assessed. Prolonged and reduced mastication was noted with soft solids with eventual oral clearance achieved. Overall, Pt is at increased risk for aspiration based on cognitive status, delayed swallow initiation, and suspected reduced airway protection. Recommend strict use of aspiration precautions. Dietary Recommendations: Dysphagia I Pureed with Mildly Thick (Nectar) Liquids, no straws, meds crushed in puree    Strategies: 90 degree positioning with all p.o. intake; small bites/sips; alternate textures through meal; reduce rate of intake    Treatment/Goals: Speech therapy for dysphagia tx 3-5 times per week.      ST.) Pt will tolerate recommended diet without s/s of aspiration   2.) If clinical symptoms of penetration/aspiration continue to be noted,Pt will tolerate MBS to r/o aspiration and determine appropriate diet/liquid level. 3.) Pt will tolerate diet advance to least restricted diet, as clinically indicated, with no signs of aspiration   4.) Pt will improve oral motor function via bolus control exercises 5/5    Oral motor Exam:  Dentition: edentulous   Labial/Facial: reduced strength, coordination, and ROM (difficulty with following commands to fully assess)  Lingual:reduced strength, coordination, and ROM   Voice: within functional limits     Oral Phase:   Prolonged mastication  Apparent premature bolus loss to pharynx    Pharyngeal Phase:  Apparent pharyngeal pooling  Delayed swallow initiation  Decreased laryngeal elevation via palpation   Intermittent coughing (immediate) with thin liquids    Patient/Family Education:Education, results and recommendations given to the Pt (no evidence of learning) and nurse, who verbalized understanding    Timed Code Treatment: 0 minutes    Total Treatment Time: 24 minutes    Discharge Recommendations: Speech Therapy for Speech/Dysphagia treatment at discharge.     Miriam Diaz M.A., [address] [place] [address]  Speech-Language Pathologist

## 2019-12-26 NOTE — CONSULTS
alcohol or use drugs. Family History:  Reviewed. family history includes Alzheimer's Disease in his mother. Review of System:  · Unable to obtain    Physical Examination:  Vitals:    19 0515   BP: 129/62   Pulse: 75   Resp: 20   Temp: 102.4 °F (39.1 °C)   SpO2: 100%      No intake/output data recorded. Wt Readings from Last 3 Encounters:   19 226 lb 12.8 oz (102.9 kg)   12/10/19 258 lb 2.5 oz (117.1 kg)   10/29/19 261 lb 14.5 oz (118.8 kg)     Temp  Av.7 °F (38.2 °C)  Min: 98.8 °F (37.1 °C)  Max: 103.4 °F (39.7 °C)  Pulse  Av.7  Min: 75  Max: 144  BP  Min: 101/50  Max: 139/50  SpO2  Av.8 %  Min: 93 %  Max: 100 %    Intake/Output Summary (Last 24 hours) at 2019 4606  Last data filed at 2019 1350  Gross per 24 hour   Intake 1000 ml   Output --   Net 1000 ml       · Telemetry: Atrial fibrillation   · Constitutional: Lethargic. Appears ill. · Head: Normocephalic and atraumatic. · Mouth/Throat: Oropharynx is dry   · eyes: Conjunctivae normal.    · Neck: Neck supple. No rigidity. No JVD present. · Cardiovascular: Normal rate, irregular rhythm, S1&S2. · Pulmonary/Chest: Bilateral respiratory sounds. · Abdominal: Soft. Bowel sounds present. No distension, No tenderness. · Musculoskeletal: No tenderness. Trace edema    · Lymphadenopathy: Has no cervical adenopathy. · Neurological: Lethargic, open eyes to verbal stimuli. Appears to have right-sided weakness  · Skin: Skin is warm and dry. Labs, diagnostic and imaging results reviewed. Reviewed.    Recent Labs     19  1137 191 19  0452    141 140   K 3.6 3.9 4.0    103 104   CO2 25 24 22   BUN 13 14 14   CREATININE 0.5* 0.7* 0.6*     Recent Labs     19  1137 19  0452   WBC 10.5 6.3   HGB 12.1* 12.5*   HCT 37.2* 38.3*   MCV 98.0 99.2    164     Lab Results   Component Value Date    CKTOTAL 33 2019    TROPONINI 0.03 2019     No 20% to 30% proximal right  coronary artery stenosis. 2. Patent left main trunk. 3. Patent left anterior descending artery and its branches. 4. A 40% to 50% stenosis of the obtuse marginal branch of the circumflex  artery.   5. Left ventricular systolic function within normal limit left heart    Scheduled Meds:   enoxaparin  1 mg/kg Subcutaneous BID    vancomycin  1,500 mg Intravenous Q12H    miconazole   Topical BID    sodium chloride flush  10 mL Intravenous 2 times per day    pantoprazole  40 mg Intravenous Daily     Continuous Infusions:   diltiazem (CARDIZEM) 125 mg in dextrose 5% 125 mL infusion 10 mg/hr (12/25/19 3800)     PRN Meds:.acetaminophen, acetaminophen, ondansetron, sodium chloride flush, magnesium hydroxide, ondansetron, morphine, potassium chloride **OR** potassium alternative oral replacement **OR** potassium chloride     Patient Active Problem List    Diagnosis Date Noted    Cerebral infarction ([facility] 05/05/2015     Priority: High    Anasarca 05/04/2015     Priority: High    AMS (altered mental status) 12/25/2019    Acute on chronic respiratory failure with hypercapnia (HCC)     Pleural effusion, bilateral     Pulmonary hypertension (HCC)     Fungal rash of torso     Closed fracture of neck of right femur ([address]     Fall at home, initial encounter     Hip fracture requiring operative repair, right, closed, initial encounter ([address] 11/30/2019    Chest wall pain     Acute respiratory failure with hypoxia (HCC)     Pleural effusion 07/27/2019    Frequent falls 05/08/2019    Flexion contracture of right knee 03/01/2018    Right hemiparesis ([address] 02/01/2018    Venous stasis dermatitis of both lower extremities 04/04/2017    Patient nonadherence 04/04/2017    Cerebrovascular accident (CVA) ([address] 01/26/2017    Anemia due to bone marrow failure ([address] 01/26/2017    Debility 09/27/2016    Diffuse large B-cell lymphoma ([address] 08/30/2016    Chronic atrial fibrillation     Acute on chronic diastolic HF (heart failure) (HCC)     Bradycardia     Abnormal stress test 10/27/2015    CHF (congestive heart failure) ([facility]     Hypokalemia 05/05/2015    Morbid obesity ([facility] 05/05/2015      Active Hospital Problems    Diagnosis Date Noted    AMS (altered mental status) [R41.82] 12/25/2019       Assessment and plan:     -Chronic permanent atrial fibrillation   Patient has been in atrial fibrillation for years and atrial fibrillation is chronic. Has multiple comorbidities as not a candidate for any invasive intervention, given the fact the patient is DNR CCA. Rate is controlled. Resume Toprol-XL. Resume Xarelto for anticoagulation. Use calcium channel blockers or increase the dose of Toprol-XL if needed. Tachycardia episodes in the setting of acute illness is common. Underlying condition treatment is recommended     - Non-obstructed CAD:    Since patient is DNR CCA, conservative medical therapy is recommended. Toprol-XL   Lipitor   Aspirin    No further EP recommendation regarding atrial fibrillation. Atrial fibrillation has been chronic and ongoing for years. During episodes of acute illness the rate will be faster. Patient is DNR CCA. Will sign off. Thank you for allowing me to participate in the care of Lem Heimlich     All questions and concerns were addressed to the patient/family. Alternatives to my treatment were discussed. I have discussed the above stated plan and the patient verbalized understanding and agreed with the plan. NOTE: This report was transcribed using voice recognition software. Every effort was made to ensure accuracy, however, inadvertent computerized transcription errors may be present.      Ana Serrato MD, MPH  [facility]   Office: [phone]

## 2019-12-26 NOTE — PLAN OF CARE
Problem: Pain:  Goal: Pain level will decrease  Description  Pain level will decrease  12/26/2019 0932 by Titus Bro RN  Outcome: Ongoing  Note:   Morphine given for pain. Problem: Risk for Impaired Skin Integrity  Goal: Tissue integrity - skin and mucous membranes  Description  Structural intactness and normal physiological function of skin and  mucous membranes. 12/26/2019 0932 by Titus Bro RN  Outcome: Ongoing  Note:   Skin assessment completed. Mepilex border placed on buttocks. Pt cleaned and miconazole administered to infected areas. Pt placed on specialty bed. Problem: Falls - Risk of:  Goal: Will remain free from falls  Description  Will remain free from falls  12/25/2019 2328 by [place] RN  Outcome: Ongoing  Note:   Patient has camera in the room. Bed alarm on. Hourly rounds performed.

## 2019-12-27 ENCOUNTER — APPOINTMENT (OUTPATIENT)
Dept: INTERVENTIONAL RADIOLOGY/VASCULAR | Age: 74
DRG: 314 | End: 2019-12-27
Payer: MEDICARE

## 2019-12-27 LAB
ANION GAP SERPL CALCULATED.3IONS-SCNC: 11 MMOL/L (ref 3–16)
APTT: 41.2 SEC (ref 24.2–36.2)
BASOPHILS ABSOLUTE: 0 K/UL (ref 0–0.2)
BASOPHILS RELATIVE PERCENT: 0.1 %
BUN BLDV-MCNC: 16 MG/DL (ref 7–20)
CALCIUM SERPL-MCNC: 8.4 MG/DL (ref 8.3–10.6)
CHLORIDE BLD-SCNC: 108 MMOL/L (ref 99–110)
CO2: 25 MMOL/L (ref 21–32)
CREAT SERPL-MCNC: 0.8 MG/DL (ref 0.8–1.3)
EOSINOPHILS ABSOLUTE: 0 K/UL (ref 0–0.6)
EOSINOPHILS RELATIVE PERCENT: 0 %
GFR AFRICAN AMERICAN: >60
GFR NON-AFRICAN AMERICAN: >60
GLUCOSE BLD-MCNC: 155 MG/DL (ref 70–99)
HCT VFR BLD CALC: 33.9 % (ref 40.5–52.5)
HEMOGLOBIN: 11 G/DL (ref 13.5–17.5)
INR BLD: 1.6 (ref 0.86–1.14)
LACTIC ACID: 2.1 MMOL/L (ref 0.4–2)
LYMPHOCYTES ABSOLUTE: 0.7 K/UL (ref 1–5.1)
LYMPHOCYTES RELATIVE PERCENT: 7.2 %
MAGNESIUM: 1.9 MG/DL (ref 1.8–2.4)
MCH RBC QN AUTO: 31.9 PG (ref 26–34)
MCHC RBC AUTO-ENTMCNC: 32.5 G/DL (ref 31–36)
MCV RBC AUTO: 98.2 FL (ref 80–100)
MONOCYTES ABSOLUTE: 0.6 K/UL (ref 0–1.3)
MONOCYTES RELATIVE PERCENT: 6.1 %
NEUTROPHILS ABSOLUTE: 8.1 K/UL (ref 1.7–7.7)
NEUTROPHILS RELATIVE PERCENT: 86.6 %
PDW BLD-RTO: 17 % (ref 12.4–15.4)
PLATELET # BLD: 162 K/UL (ref 135–450)
PMV BLD AUTO: 8.5 FL (ref 5–10.5)
POTASSIUM REFLEX MAGNESIUM: 3.5 MMOL/L (ref 3.5–5.1)
PROTHROMBIN TIME: 18.6 SEC (ref 10–13.2)
RBC # BLD: 3.46 M/UL (ref 4.2–5.9)
SODIUM BLD-SCNC: 144 MMOL/L (ref 136–145)
WBC # BLD: 9.4 K/UL (ref 4–11)

## 2019-12-27 PROCEDURE — 85025 COMPLETE CBC W/AUTO DIFF WBC: CPT

## 2019-12-27 PROCEDURE — 2580000003 HC RX 258: Performed by: INTERNAL MEDICINE

## 2019-12-27 PROCEDURE — 99233 SBSQ HOSP IP/OBS HIGH 50: CPT | Performed by: NURSE PRACTITIONER

## 2019-12-27 PROCEDURE — 6360000002 HC RX W HCPCS: Performed by: NURSE PRACTITIONER

## 2019-12-27 PROCEDURE — 86403 PARTICLE AGGLUT ANTBDY SCRN: CPT

## 2019-12-27 PROCEDURE — 36590 REMOVAL TUNNELED CV CATH: CPT

## 2019-12-27 PROCEDURE — 87077 CULTURE AEROBIC IDENTIFY: CPT

## 2019-12-27 PROCEDURE — 36415 COLL VENOUS BLD VENIPUNCTURE: CPT

## 2019-12-27 PROCEDURE — 02PAX3Z REMOVAL OF INFUSION DEVICE FROM HEART, EXTERNAL APPROACH: ICD-10-PCS | Performed by: RADIOLOGY

## 2019-12-27 PROCEDURE — 2580000003 HC RX 258: Performed by: NURSE PRACTITIONER

## 2019-12-27 PROCEDURE — 6370000000 HC RX 637 (ALT 250 FOR IP): Performed by: NURSE PRACTITIONER

## 2019-12-27 PROCEDURE — 85610 PROTHROMBIN TIME: CPT

## 2019-12-27 PROCEDURE — 6370000000 HC RX 637 (ALT 250 FOR IP): Performed by: INTERNAL MEDICINE

## 2019-12-27 PROCEDURE — 02HV33Z INSERTION OF INFUSION DEVICE INTO SUPERIOR VENA CAVA, PERCUTANEOUS APPROACH: ICD-10-PCS | Performed by: RADIOLOGY

## 2019-12-27 PROCEDURE — 2700000000 HC OXYGEN THERAPY PER DAY

## 2019-12-27 PROCEDURE — 83605 ASSAY OF LACTIC ACID: CPT

## 2019-12-27 PROCEDURE — 36573 INSJ PICC RS&I 5 YR+: CPT

## 2019-12-27 PROCEDURE — 85730 THROMBOPLASTIN TIME PARTIAL: CPT

## 2019-12-27 PROCEDURE — 87040 BLOOD CULTURE FOR BACTERIA: CPT

## 2019-12-27 PROCEDURE — 6360000002 HC RX W HCPCS: Performed by: RADIOLOGY

## 2019-12-27 PROCEDURE — 99232 SBSQ HOSP IP/OBS MODERATE 35: CPT | Performed by: PSYCHIATRY & NEUROLOGY

## 2019-12-27 PROCEDURE — 99152 MOD SED SAME PHYS/QHP 5/>YRS: CPT

## 2019-12-27 PROCEDURE — 99153 MOD SED SAME PHYS/QHP EA: CPT

## 2019-12-27 PROCEDURE — 0JPT0WZ REMOVAL OF TOTALLY IMPLANTABLE VASCULAR ACCESS DEVICE FROM TRUNK SUBCUTANEOUS TISSUE AND FASCIA, OPEN APPROACH: ICD-10-PCS | Performed by: RADIOLOGY

## 2019-12-27 PROCEDURE — 87070 CULTURE OTHR SPECIMN AEROBIC: CPT

## 2019-12-27 PROCEDURE — 2060000000 HC ICU INTERMEDIATE R&B

## 2019-12-27 PROCEDURE — 77001 FLUOROGUIDE FOR VEIN DEVICE: CPT

## 2019-12-27 PROCEDURE — 2500000003 HC RX 250 WO HCPCS: Performed by: INTERNAL MEDICINE

## 2019-12-27 PROCEDURE — 94760 N-INVAS EAR/PLS OXIMETRY 1: CPT

## 2019-12-27 PROCEDURE — 80048 BASIC METABOLIC PNL TOTAL CA: CPT

## 2019-12-27 PROCEDURE — C1751 CATH, INF, PER/CENT/MIDLINE: HCPCS

## 2019-12-27 PROCEDURE — 87186 SC STD MICRODIL/AGAR DIL: CPT

## 2019-12-27 PROCEDURE — 99233 SBSQ HOSP IP/OBS HIGH 50: CPT | Performed by: INTERNAL MEDICINE

## 2019-12-27 PROCEDURE — 83735 ASSAY OF MAGNESIUM: CPT

## 2019-12-27 PROCEDURE — 6360000002 HC RX W HCPCS: Performed by: INTERNAL MEDICINE

## 2019-12-27 RX ORDER — METOPROLOL TARTRATE 50 MG/1
50 TABLET, FILM COATED ORAL 2 TIMES DAILY
Status: DISCONTINUED | OUTPATIENT
Start: 2019-12-27 | End: 2020-01-03 | Stop reason: HOSPADM

## 2019-12-27 RX ORDER — SODIUM CHLORIDE 9 MG/ML
INJECTION, SOLUTION INTRAVENOUS CONTINUOUS
Status: DISCONTINUED | OUTPATIENT
Start: 2019-12-27 | End: 2019-12-29

## 2019-12-27 RX ORDER — BACTERIOSTATIC SODIUM CHLORIDE 0.9 %
15 VIAL (ML) INJECTION PRN
Status: DISCONTINUED | OUTPATIENT
Start: 2019-12-27 | End: 2020-01-03 | Stop reason: HOSPADM

## 2019-12-27 RX ORDER — LIDOCAINE HYDROCHLORIDE 10 MG/ML
10 INJECTION, SOLUTION EPIDURAL; INFILTRATION; INTRACAUDAL; PERINEURAL ONCE
Status: COMPLETED | OUTPATIENT
Start: 2019-12-27 | End: 2019-12-27

## 2019-12-27 RX ORDER — MIDAZOLAM HYDROCHLORIDE 1 MG/ML
INJECTION INTRAMUSCULAR; INTRAVENOUS
Status: COMPLETED | OUTPATIENT
Start: 2019-12-27 | End: 2019-12-27

## 2019-12-27 RX ORDER — LIDOCAINE HYDROCHLORIDE AND EPINEPHRINE BITARTRATE 20; .01 MG/ML; MG/ML
20 INJECTION, SOLUTION SUBCUTANEOUS ONCE
Status: COMPLETED | OUTPATIENT
Start: 2019-12-27 | End: 2019-12-27

## 2019-12-27 RX ORDER — FENTANYL CITRATE 50 UG/ML
INJECTION, SOLUTION INTRAMUSCULAR; INTRAVENOUS
Status: COMPLETED | OUTPATIENT
Start: 2019-12-27 | End: 2019-12-27

## 2019-12-27 RX ORDER — BUPIVACAINE HYDROCHLORIDE AND EPINEPHRINE 2.5; 5 MG/ML; UG/ML
7.5 INJECTION, SOLUTION EPIDURAL; INFILTRATION; INTRACAUDAL; PERINEURAL ONCE
Status: COMPLETED | OUTPATIENT
Start: 2019-12-27 | End: 2019-12-27

## 2019-12-27 RX ADMIN — FENTANYL CITRATE 25 MCG: 50 INJECTION, SOLUTION INTRAMUSCULAR; INTRAVENOUS at 11:04

## 2019-12-27 RX ADMIN — LIDOCAINE HYDROCHLORIDE AND EPINEPHRINE 3 ML: 20; 10 INJECTION, SOLUTION INFILTRATION; PERINEURAL at 11:48

## 2019-12-27 RX ADMIN — SODIUM CHLORIDE: 9 INJECTION, SOLUTION INTRAVENOUS at 21:38

## 2019-12-27 RX ADMIN — SODIUM CHLORIDE 7 ML: 9 INJECTION, SOLUTION INTRAMUSCULAR; INTRAVENOUS; SUBCUTANEOUS at 11:49

## 2019-12-27 RX ADMIN — ATORVASTATIN CALCIUM 20 MG: 20 TABLET, FILM COATED ORAL at 23:52

## 2019-12-27 RX ADMIN — LIDOCAINE HYDROCHLORIDE 10 ML: 10 INJECTION, SOLUTION EPIDURAL; INFILTRATION; INTRACAUDAL; PERINEURAL at 11:47

## 2019-12-27 RX ADMIN — Medication: at 12:55

## 2019-12-27 RX ADMIN — MIDAZOLAM 0.5 MG: 1 INJECTION INTRAMUSCULAR; INTRAVENOUS at 11:04

## 2019-12-27 RX ADMIN — Medication 10 ML: at 22:50

## 2019-12-27 RX ADMIN — MORPHINE SULFATE 4 MG: 4 INJECTION, SOLUTION INTRAMUSCULAR; INTRAVENOUS at 05:08

## 2019-12-27 RX ADMIN — METOPROLOL TARTRATE 50 MG: 50 TABLET, FILM COATED ORAL at 16:04

## 2019-12-27 RX ADMIN — VANCOMYCIN HYDROCHLORIDE 1250 MG: 10 INJECTION, POWDER, LYOPHILIZED, FOR SOLUTION INTRAVENOUS at 02:18

## 2019-12-27 RX ADMIN — METOPROLOL TARTRATE 50 MG: 50 TABLET, FILM COATED ORAL at 23:53

## 2019-12-27 RX ADMIN — MICONAZOLE NITRATE: 20 POWDER TOPICAL at 09:06

## 2019-12-27 RX ADMIN — LIDOCAINE HYDROCHLORIDE 10 ML: 10 INJECTION, SOLUTION EPIDURAL; INFILTRATION; INTRACAUDAL; PERINEURAL at 11:16

## 2019-12-27 RX ADMIN — ENOXAPARIN SODIUM 120 MG: 120 INJECTION SUBCUTANEOUS at 22:49

## 2019-12-27 RX ADMIN — BUPIVACAINE HYDROCHLORIDE AND EPINEPHRINE BITARTRATE 3 ML: 2.5; .005 INJECTION, SOLUTION EPIDURAL; INFILTRATION; INTRACAUDAL; PERINEURAL at 11:47

## 2019-12-27 RX ADMIN — MICONAZOLE NITRATE: 20 POWDER TOPICAL at 22:46

## 2019-12-27 RX ADMIN — VANCOMYCIN HYDROCHLORIDE 1250 MG: 10 INJECTION, POWDER, LYOPHILIZED, FOR SOLUTION INTRAVENOUS at 13:22

## 2019-12-27 RX ADMIN — SODIUM BICARBONATE 0.5 MEQ: 0.2 INJECTION, SOLUTION INTRAVENOUS at 11:48

## 2019-12-27 ASSESSMENT — PAIN SCALES - GENERAL
PAINLEVEL_OUTOF10: 7
PAINLEVEL_OUTOF10: 0

## 2019-12-27 NOTE — PROGRESS NOTES
Hospitalist Progress Note      PCP: Rakesh Lindo MD    Date of Admission: 12/25/2019        Hospital Course: admitted with AMS, found to have MRSA sepsis    Subjective: pt seen and examined, s/p port removal .asking for water. Denies any pain. Medications:  Reviewed    Infusion Medications    sodium chloride 100 mL/hr at 12/26/19 2238     Scheduled Medications    metoprolol tartrate  50 mg Oral BID    enoxaparin  1 mg/kg Subcutaneous BID    aspirin  81 mg Oral Daily    atorvastatin  20 mg Oral Nightly    vancomycin  1,250 mg Intravenous Q12H    sodium chloride flush  10 mL Intravenous 2 times per day    lidocaine 1 % injection  5 mL Intradermal Once    sodium chloride flush  10 mL Intravenous 2 times per day    miconazole   Topical BID    pantoprazole  40 mg Intravenous Daily     PRN Meds: sodium chloride bacteriostatic, acetaminophen, perflutren lipid microspheres, sodium chloride flush, sodium chloride flush, sodium chloride flush, acetaminophen, ondansetron, magnesium hydroxide, ondansetron, morphine, potassium chloride **OR** potassium alternative oral replacement **OR** potassium chloride      Intake/Output Summary (Last 24 hours) at 12/27/2019 1656  Last data filed at 12/27/2019 0636  Gross per 24 hour   Intake 1400 ml   Output 1100 ml   Net 300 ml       Physical Exam Performed:    BP (!) 110/59   Pulse 132   Temp 99.8 °F (37.7 °C) (Temporal)   Resp 24   Ht 6' 1\" (1.854 m)   Wt 234 lb 12.8 oz (106.5 kg)   SpO2 97%   BMI 30.98 kg/m²     General appearance: No apparent distress, appears stated age and cooperative. Respiratory:  Normal respiratory effort. Clear to auscultation, bilaterally without Rales/Wheezes/Rhonchi. Cardiovascular: Regular rate and rhythm with normal S1/S2 without murmurs, rubs or gallops. Abdomen: Soft, non-tender, non-distended with normal bowel sounds. Musculoskeletal: No clubbing, cyanosis or edema bilaterally.     Psychiatric: Alert and oriented, thought content appropriate, normal insight        Labs:   Recent Labs     12/25/19  1137 12/26/19  0452 12/27/19  0440   WBC 10.5 6.3 9.4   HGB 12.1* 12.5* 11.0*   HCT 37.2* 38.3* 33.9*    164 162     Recent Labs     12/25/19  2231 12/26/19  0452 12/27/19  0440    140 144   K 3.9 4.0 3.5    104 108   CO2 24 22 25   BUN 14 14 16   CREATININE 0.7* 0.6* 0.8   CALCIUM 9.0 8.7 8.4     Recent Labs     12/25/19  1137 12/26/19  0452   AST 32 34   ALT 20 19   BILITOT 1.3* 1.3*   ALKPHOS 156* 136*     Recent Labs     12/25/19  1137 12/27/19  0440   INR 2.16* 1.60*     Recent Labs     12/25/19  1137   CKTOTAL 33*   TROPONINI 0.03*       Urinalysis:      Lab Results   Component Value Date    NITRU Negative 12/25/2019    WBCUA 2 12/25/2019    BACTERIA RARE 10/29/2019    RBCUA 1 12/25/2019    BLOODU Negative 12/25/2019    SPECGRAV 1.011 12/25/2019    GLUCOSEU Negative 12/25/2019       Radiology:  IR REMOVE TUNNELED CVAD W SQ PORT/PUMP INSERT   Final Result   Successful subcutaneous Port-A-Cath removal.         IR INSERT PICC VAD W SQ PORT >5 YEARS   Final Result   Successful ultrasound and fluoroscopy guided PICC placement         CT ABDOMEN PELVIS WO CONTRAST   Final Result   Acute right femoral neck fracture. Small right pleural effusion. Mild bibasilar atelectasis. No acute inflammatory abnormality is identified in the abdomen or pelvis. Cholelithiasis. MRI BRAIN W WO CONTRAST   Final Result   1. Motion degrades images limiting evaluation. 2. No convincing acute intracranial abnormality. No acute infarct. 3. Mild global parenchymal volume loss with chronic microvascular ischemic   changes. 4. Scattered mucosal thickening of the paranasal sinuses. 5. Left mastoid effusion. VL Extremity Venous Right   Final Result      CT Head WO Contrast   Final Result   No acute intracranial abnormality.          XR CHEST PORTABLE   Final Result   Cardiomegaly with mild interstitial edema, small pleural effusions, and   bibasilar atelectasis                 Assessment/Plan:    Active Hospital Problems    Diagnosis    Sepsis due to methicillin resistant Staphylococcus aureus (MRSA) ([facility] [A41.02]    MRSA bacteremia [R78.81]    Port-A-Cath in place [Z95.828]    Chronic hepatitis C without hepatic coma ([facility] [B18.2]    History of stroke [Z86.73]    Right hemiplegia (HCC) [G81.91]    Chronic obstructive pulmonary disease ([facility] [J44.9]    AMS (altered mental status) [R41.82]    Tinea cruris [B35.6]    Closed fracture of right hip (HCC) [S72.001A]    Diffuse high grade B-cell lymphoma (HCC) [C83.30]    Class 1 obesity due to excess calories with body mass index (BMI) of 31.0 to 31.9 in adult [E66.09, Z68.31]    Atrial fibrillation with RVR ([facility] [I48.91]     MRSA bacteremia/sepsis : concern for possible line etiology, port removed today . Cont IV abx.  Repeat blood cx in AM   Check lactate , IVF hydration   Monitor Is and Os  Dysphagia level diet   Afip : on xarelto and BB  Right hip fracture : no a sx candidate due to infection in right groin (fungal)   Hx of CVA with residual dysarthria   Hx of diffuse B-cell lymphoma   DVT Prophylaxis: lovenox   Diet: Diet NPO Effective Now  Code Status: DNR-CCA        Dispo - possible d/c to ECF in 2-3 days pending progress      Deondre Miles MD

## 2019-12-27 NOTE — PROGRESS NOTES
[facility]), Chronic obstructive pulmonary disease ([facility] Hypertension, Hypokalemia, Infestation by bed bug, MRSA colonization (12/04/2019), and Obesity. with following problems:    · Sepsis with high fever  · Complicated MRSA bacteremia  · Severe tinea cruris  · Need for contact isolation  · Diffuse large B-cell lymphoma  · Port-A-Cath in place in the chest  · Chronic hepatitis C  · History of stroke   · Chronic right hemiplegia  · Chronic right hip fracture  · History of right total knee arthroplasty in the past  · COPD  · Chronic atrial fibrillation  · Obesity Class 1 due to excess calorie intake : Body mass index is 30.98 kg/m². Discussion:      The patient lives at [facility] and was recently hospitalized for right hip fracture. The surgery had to be canceled due to severe tinea cruris. He is now admitted with metabolic encephalopathy and high fever. Blood cultures are positive for MRSA. MRSA susceptibilities are pending      Plan:     Diagnostic Workup:    · Repeat blood cultures today  · Continue to follow  fever curve, WBC count and blood cultures  · Follow up on 2D echo    Antimicrobials:    · Will continue IV vancomycin  Check Vancomycin trough before the 5th dose. Target vancomycin trough of around 15. Keep vancomycin trough below 20 at all times. Avoid increasing the dose of vancomycin above a total of 4 grams in a 24-hour period in patients younger than 45 years and above 3 grams in a 24-hour period in a patient of age 39 years or older. Continue to monitor serum creatinine and Vanco levels closely, while the patient is on I/v Vancomycin. · Continue to monitor his vitals closely  · Agree with Port-A-Cath removal, has been done today by IR  · He had a PICC line placed today.   Given ongoing MRSA bacteremia, the PICC may need to be removed if blood cultures from today also come back positive  · Continue to watch for any increasing pain or redness or swelling at the right total knee arthroplasty site  · We will follow-up on the culture results and clinical progress and will make further recommendations accordingly  · Contact isolation for MRSA  · Fall precaution  · DVT prophylaxis  · Discussed the above plan with RN       Drug Monitoring:    · Continue monitoring for antibiotic toxicity as follows: Vancomycin trough, CMP  · Continue to watch for following: new or worsening fever, new hypotension, hives, lip swelling and redness or purulence at vascular access sites. Current isolation precautions:    Currently active isolation(s): Contact     I/v access Management:    · Continue to monitor i.v access sites for erythema, induration,discharge or tenderness. · As always, continue efforts to minimize tubes/ lines/ drains as clinically appropriate to reduce chances of line associated infections. Level of complexity of visit: High     Risk of Complications/Morbidity: High     · Illness(es)/ Infection present that pose threat to life/bodily function. · There is potential for severe exacerbation of infection/side effects of treatment. · Therapy requires intensive monitoring for antimicrobial agent toxicity. Thank you for involving me in the care of your patient. I will continue to follow. If you have any additional questions, please do not hesitate to contact me. Subjective: Interval history: Patient was seen and examined at bedside. Interval history was obtained. The patient appears tired. Seems to be tolerating antibiotics okay. He is  encephalopathic and not able to give much history. REVIEW OF SYSTEMS:    Review of Systems   Unable to perform ROS: Mental status change         Past Medical History: All past medical history reviewed today.     Past Medical History:   Diagnosis Date    Atrial fibrillation (HCC)     B-cell lymphoma (HCC)     Back pain     Cancer (HCC)     [facility]     CHF (congestive heart failure) (HCC)     Chronic hepatitis C without hepatic coma (HCC)     Chronic obstructive pulmonary disease (HCC)     Hypertension     Hypokalemia     Infestation by bed bug     2018    MRSA colonization 12/04/2019    Obesity        Past Surgical History: All past surgical history was reviewed today. Past Surgical History:   Procedure Laterality Date    FINE NEEDLE ASPIRATION      JOINT REPLACEMENT Right 2010    right TKA Knee via Right    KNEE SURGERY      TUNNELED VENOUS PORT PLACEMENT Left 09/02/2016    DR. Jason Almazan POWER PORT       Family History: All family history was reviewed today. Problem Relation Age of Onset    Alzheimer's Disease Mother        Objective:       PHYSICAL EXAM:      Vitals:   Vitals:    12/27/19 1110 12/27/19 1115 12/27/19 1120 12/27/19 1245   BP: 129/73 121/77 132/76 (!) 107/56   Pulse: 144 124 125 126   Resp: 16 25 28 26   Temp:    99.7 °F (37.6 °C)   TempSrc:    Temporal   SpO2: 100% 100% 100% 100%   Weight:       Height:           Physical Exam       General: Encephalopathic but protecting the airway so far,   HEENT: normocephalic, atraumatic, sclera clear, pupils equal, light reflex preserved bilaterally  Cardiovascular: RRR, no murmurs/rubs/gallops detected  Pulmonary: CTABL, no rhonchi/rales   Abdomen/GI: soft, no organomegaly, bowel sounds positive  Neuro: Encephalopathic, pupils are equal and reactive to light, moves all extremities  Skin: no rash,   Musculoskeletal: Tenderness in the right knee area, continue to watch for any abscess development  Genitourinary: Chou's catheter in place   Psych: could not assess   Lymphatic/Immunologic: No obvious bruising, no cervical lymphadenopathy    Lines: All vascular access sites are healthy with no local erythema, discharge or tenderness    Intake and output:    I/O last 3 completed shifts: In: 3085 [I.V.:1440; IV Piggyback:250]  Out: 2050 [Urine:2050]    Lab Data:   All available labs and old records have been reviewed by me.     CBC:  Recent Labs     12/25/19  1137 12/26/19  0452 12/27/19  0440   WBC 10.5 6.3 9.4   RBC 3.80* 3.86* 3.46*   HGB 12.1* 12.5* 11.0*   HCT 37.2* 38.3* 33.9*    164 162   MCV 98.0 99.2 98.2   MCH 31.9 32.3 31.9   MCHC 32.6 32.6 32.5   RDW 16.4* 17.0* 17.0*        BMP:  Recent Labs     12/25/19  2231 12/26/19  0452 12/27/19  0440    140 144   K 3.9 4.0 3.5    104 108   CO2 24 22 25   BUN 14 14 16   CREATININE 0.7* 0.6* 0.8   CALCIUM 9.0 8.7 8.4   GLUCOSE 151* 156* 155*        Hepatic Function Panel:   Lab Results   Component Value Date    ALKPHOS 136 12/26/2019    ALT 19 12/26/2019    AST 34 12/26/2019    PROT 7.2 12/26/2019    PROT 7.0 06/01/2017    BILITOT 1.3 12/26/2019    BILIDIR 0.3 05/09/2019    IBILI 0.5 05/09/2019    LABALBU 2.7 12/26/2019       CPK:   Lab Results   Component Value Date    CKTOTAL 33 (L) 12/25/2019     ESR: No results found for: SEDRATE  CRP: No results found for: CRP        Imaging: All pertinent images and reports for the current visit were reviewed by me during this visit. IR REMOVE TUNNELED CVAD W SQ PORT/PUMP INSERT   Final Result   Successful subcutaneous Port-A-Cath removal.         IR INSERT PICC VAD W SQ PORT >5 YEARS   Final Result   Successful ultrasound and fluoroscopy guided PICC placement         CT ABDOMEN PELVIS WO CONTRAST   Final Result   Acute right femoral neck fracture. Small right pleural effusion. Mild bibasilar atelectasis. No acute inflammatory abnormality is identified in the abdomen or pelvis. Cholelithiasis. MRI BRAIN W WO CONTRAST   Final Result   1. Motion degrades images limiting evaluation. 2. No convincing acute intracranial abnormality. No acute infarct. 3. Mild global parenchymal volume loss with chronic microvascular ischemic   changes. 4. Scattered mucosal thickening of the paranasal sinuses. 5. Left mastoid effusion.          VL Extremity Venous Right   Final Result      CT Head WO Contrast   Final Result   No acute intracranial abnormality. XR CHEST PORTABLE   Final Result   Cardiomegaly with mild interstitial edema, small pleural effusions, and   bibasilar atelectasis             Medications: All current and past medications were reviewed.      topical skin adhesive   Topical Once    metoprolol tartrate  50 mg Oral BID    enoxaparin  1 mg/kg Subcutaneous BID    aspirin  81 mg Oral Daily    atorvastatin  20 mg Oral Nightly    vancomycin  1,250 mg Intravenous Q12H    sodium chloride flush  10 mL Intravenous 2 times per day    lidocaine 1 % injection  5 mL Intradermal Once    sodium chloride flush  10 mL Intravenous 2 times per day    miconazole   Topical BID    pantoprazole  40 mg Intravenous Daily        sodium chloride 100 mL/hr at 12/26/19 2238       sodium chloride bacteriostatic, acetaminophen, perflutren lipid microspheres, sodium chloride flush, sodium chloride flush, sodium chloride flush, acetaminophen, ondansetron, magnesium hydroxide, ondansetron, morphine, potassium chloride **OR** potassium alternative oral replacement **OR** potassium chloride      Problem list:       Patient Active Problem List   Diagnosis Code    Anasarca R60.1    Atrial fibrillation with RVR ([facility]) I48.91    Cerebral infarction ([facility] I63.9    Hypokalemia E87.6    Class 1 obesity due to excess calories with body mass index (BMI) of 31.0 to 31.9 in adult E66.09, Z68.31    CHF (congestive heart failure) ([facility]) I50.9    Abnormal stress test R94.39    Chronic atrial fibrillation I48.20    Acute on chronic diastolic HF (heart failure) ([facility]) I50.33    Bradycardia R00.1    Diffuse high grade B-cell lymphoma ([facility]) C83.30    Debility R53.81    Cerebrovascular accident (CVA) ([facility] I63.9    Anemia due to bone marrow failure ([facility]) D61.9    Venous stasis dermatitis of both lower extremities I87.2    Patient nonadherence Z91.19    Right hemiparesis ([facility]) G81.91    Flexion contracture of right knee M24.561    Frequent

## 2019-12-27 NOTE — PROGRESS NOTES
MCHC 32.5 12/27/2019    RDW 17.0 12/27/2019     12/27/2019    MPV 8.5 12/27/2019     BMP:    Lab Results   Component Value Date     12/27/2019    K 3.5 12/27/2019     12/27/2019    CO2 25 12/27/2019    BUN 16 12/27/2019    LABALBU 2.7 12/26/2019    CREATININE 0.8 12/27/2019    CALCIUM 8.4 12/27/2019    GFRAA >60 12/27/2019    LABGLOM >60 12/27/2019    GLUCOSE 155 12/27/2019    GLUCOSE 114 06/01/2017     RADIOLOGY REVIEW:  I have reviewed radiology image(s) and reports(s) of: CT scan of the head      IMPRESSION :  Acute encephalopathy probably due to sepsis  Blood cultures are suggesting septicemia with MRSA  Previous left hemisphere stroke with mild right hemiparesis and significant dysarthria and aphasia  Atrial fibrillation on Xarelto  Patient Active Problem List   Diagnosis    Anasarca    Cerebral infarction ([address]    Hypokalemia    Morbid obesity ([address]    CHF (congestive heart failure) (HCC)    Abnormal stress test    Chronic atrial fibrillation    Acute on chronic diastolic HF (heart failure) (HCC)    Bradycardia    Diffuse large B-cell lymphoma ([address]    Debility    Cerebrovascular accident (CVA) ([address]    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)    Venous stasis dermatitis of both lower extremities    Patient nonadherence    [facility] hemip[facility]is ([facility]    Flexion contracture of right knee    Frequent falls    Pleural effusion    Acute respiratory failure with hypoxia (HCC)    Chest wall pain    Hip fracture requiring operative repair, right, closed, initial encounter ([address]    Closed fracture of neck of right femur ([address]    Fall at home, initial encounter    Fungal rash of torso    Acute on chronic respiratory failure with hypercapnia (HCC)    Pleural effusion, bilateral    Pulmonary hypertension ([address]    AMS (altered mental status)       RECOMMENDATIONS :  Continue antibiotics as per infectious disease consultant  Continue Xarelto          Please note a portion of this

## 2019-12-27 NOTE — PROGRESS NOTES
[facility]   Electrophysiology Progress Note     Date: 12/27/2019  Admit Date: 12/25/2019     Reason for consultation: Atrial fibrillation    Chief Complaint:   Chief Complaint   Patient presents with    Altered Mental Status     pt transported from [place] [address] by [employer] for altered mental status. nurse report stated patient was awake at breakfast, he was hard to get awake at 0930  and she was unable to give patient his morning meds. pt hx right hip fracture 15 days ago, being treated for fungal infection in right hip.  pt is a DNRCCA       History of Present Illness: History obtained from patient and medical record. Alix Kidd is a 69 y.o. male with a past medical history of chronic permanent atrial fibrillation, history of CVA, hypertension, B-cell lymphoma. He was brought to the hospital with altered mental status. Reportedly he was not taking his medication, and had baseline change of mental status. Patient is poor historian and is unable to provide further history. Patient is DNR-CCA. He has had a recent fall and hip fracture but was a poor candidate for surgical correction. On admission he was in atrial fibrillation with mild tachycardia. Cardiology has been consulted for atrial fibrillation    Interval Hx: Today, he is being seen for follow-up. He remains in atrial fibrillation, rate is elevated today. He is more responsive, but still lethargic and unable to give history. Plan for port removal and PICC line placement today. He is a DNR-CCA. Patient seen and examined. Clinical notes reviewed. Telemetry reviewed. Allergies:  No Known Allergies    Home Meds:  Prior to Visit Medications    Medication Sig Taking? Authorizing Provider   citalopram (CELEXA) 20 MG tablet Take 20 mg by mouth daily  Historical Provider, MD   oxyCODONE (OXYCONTIN) 10 MG extended release tablet Take 10 mg by mouth every 12 hours.   Historical Provider, MD   oxyCODONE-acetaminophen mg Oral Daily    atorvastatin  20 mg Oral Nightly    metoprolol tartrate  25 mg Oral BID    vancomycin  1,250 mg Intravenous Q12H    sodium chloride flush  10 mL Intravenous 2 times per day    lidocaine 1 % injection  5 mL Intradermal Once    sodium chloride flush  10 mL Intravenous 2 times per day    miconazole   Topical BID    pantoprazole  40 mg Intravenous Daily     Continuous Infusions:   sodium chloride 100 mL/hr at 12/26/19 2238     PRN Meds:acetaminophen, perflutren lipid microspheres, sodium chloride flush, sodium chloride flush, sodium chloride flush, acetaminophen, ondansetron, magnesium hydroxide, ondansetron, morphine, potassium chloride **OR** potassium alternative oral replacement **OR** potassium chloride     Past Medical History:  Past Medical History:   Diagnosis Date    [facility]     B-cell lymphoma [facility]     Back pain     Cancer ([facility]     Cerebral infarction ([facility]     CHF (congestive heart failure) ([facility]     Hypertension     Hypokalemia     Infestation by bed bug     2018    MRSA colonization 12/04/2019    Obesity         Past Surgical History:    has a past surgical history that includes joint replacement (Right, 2010); knee surgery; fine needle aspiration; and Tunneled venous port placement (Left, 09/02/2016). Social History:  Reviewed. reports that he quit smoking about 8 years ago. He smoked 1.00 pack per day. He has never used smokeless tobacco. He reports that he does not drink alcohol or use drugs. Family History:  Reviewed. family history includes Alzheimer's Disease in his mother.      Review of Systems:  Unable to obtain    Physical Examination:  Vitals:    12/27/19 0815   BP: 110/69   Pulse: 115   Resp: 16   Temp: 98.6 °F (37 °C)   SpO2: 96%      In: 1400 [I.V.:1400]  Out: 1100    Wt Readings from Last 3 Encounters:   12/27/19 234 lb 12.8 oz (106.5 kg)   12/10/19 258 lb 2.5 oz (117.1 kg)   10/29/19 261 lb 14.5 oz (118.8 kg) Intake/Output Summary (Last 24 hours) at 12/27/2019 1009  Last data filed at 12/27/2019 0636  Gross per 24 hour   Intake 1690 ml   Output 1300 ml   Net 390 ml       Telemetry: Personally Reviewed  - Atrial fibrillation   · Constitutional: Lethargic but arousable in no apparent distress, and appears ill  · Skin: Warm and pink; no pallor, cyanosis, bruising, or clubbing  · HEENT: Symmetric and normocephalic. PERRL, EOM intact. Conjunctiva pink with clear sclera. Mucus membranes pink and moist. Teeth intact. Thyroid smooth without nodules or goiter. · Cardiovascular: Tachycardic rate and irregular rhythm. S1/S2 present without murmurs, rubs, or gallops. Peripheral pulses 2+, capillary refill < 3 seconds. No elevation of JVP. No peripheral edema  · Respiratory: Respirations symmetric and unlabored. Lungs clear to auscultation bilaterally, no wheezing, crackles, or rhonchi  · Gastrointestinal: Abdomen soft and round. Bowel sounds normoactive in all quadrants without tenderness or masses. · Musculoskeletal: Bilateral upper and lower extremity strength 4/5 with reduced ROM (L>R)  · Neurologic/Psych: Lethargic, but follows commands. + for dysarthria. Poor historian    Pertinent labs, diagnostic, device, and imaging results reviewed as a part of this visit    Labs:    BMP:   Recent Labs     12/25/19  2231 12/26/19  0452 12/27/19  0440    140 144   K 3.9 4.0 3.5    104 108   CO2 24 22 25   BUN 14 14 16   CREATININE 0.7* 0.6* 0.8   MG  --   --  1.90     Estimated Creatinine Clearance: 105 mL/min (based on SCr of 0.8 mg/dL).    CBC:   Recent Labs     12/25/19  1137 12/26/19  0452 12/27/19  0440   WBC 10.5 6.3 9.4   HGB 12.1* 12.5* 11.0*   HCT 37.2* 38.3* 33.9*   MCV 98.0 99.2 98.2    164 162     Thyroid:   Lab Results   Component Value Date    TSH 1.90 07/30/2019     Lipids:   Lab Results   Component Value Date    CHOL 91 05/15/2017    HDL 48 05/09/2019    TRIG 109 05/15/2017     LFTS:   Lab Results discussed with patient. Risks, benefits and alternative of each treatment options were explained. All questions answered    ~ Due to extensive co-mordbidities and DNR status, plus long term nature of his AF, he is a poor candidate for invasive procedures (DCCV, ablation)    2. CAD  - Non-obstructive per [facility] (2015)  - Stable  - No complaints of angina  - Continue ASA, BB, and statin    3. HTN  - Controlled: Goal <130/80  - Continue current medications    4. Sepsis   - + for MRSA bacteremia on admission   - On ABX   - ID following    Multiple medical conditions with risk of decompensation. All pertinent information and plan of care discussed with the EP physician. All questions and concerns were addressed to the patient. Alternatives to my treatment were discussed. I have discussed the above stated plan with patient and the nurse. The patient verbalized understanding and agreed with the plan. Thank you for allowing to us to participate in the care of Santosh Urbano.     KADY Tolbert-ZAID  [address]   Office: [phone]

## 2019-12-27 NOTE — PROGRESS NOTES
Pt in bed moaning . Pt alert to verbal stimuli and follows commands. Morphine given for pain and pt repositioned for comfort. Will continue to monitor.        Mary Ann Regaladoing JOSE

## 2019-12-27 NOTE — PRE SEDATION
rivaroxaban (XARELTO) 20 MG TABS tablet TAKE 1 TABLET BY MOUTH DAILY 5/22/19   [place] MD   metoprolol succinate (TOPROL XL) 25 MG extended release tablet TAKE 1 TABLET BY MOUTH DAILY 9/19/18   [place] MD   ferrous sulfate (BEST-GLORIA) 325 (65 Fe) MG tablet Take 1 tablet by mouth 2 times daily 2/1/18   Yaneli Ponce MD   acetaminophen (TYLENOL) 325 MG tablet Take 650 mg by mouth every 6 hours as needed for Pain     Historical Provider, MD     Coumadin Use Last 7 Days:  no  Antiplatelet drug therapy use last 7 days: no  Other anticoagulant use last 7 days: no  Additional Medication Information:  n/a      Pre-Sedation Documentation and Exam:   I have reviewed the patient's history and review of systems.     Mallampati Airway Assessment:  Mallampati Class II - (soft palate, fauces & uvula are visible)    Prior History of Anesthesia Complications:   none    ASA Classification:  Class 2 - A normal healthy patient with mild systemic disease    Sedation/ Anesthesia Plan:   intravenous sedation    Medications Planned:   midazolam (Versed) intravenously and fentanyl intravenously    Patient is an appropriate candidate for plan of sedation: yes    Electronically signed by Gregory Osei MD on 12/27/2019 at 12:05 PM

## 2019-12-27 NOTE — PLAN OF CARE
Problem: Pain:  Description  Pain management should include both nonpharmacologic and pharmacologic interventions. Goal: Pain level will decrease  Description  Pain level will decrease  12/26/2019 2225 by Marcia Argueta RN  Outcome: Ongoing  12/26/2019 0932 by Hesham Dewey RN  Outcome: Ongoing  Note:   Morphine given for pain. Goal: Control of acute pain  Description  Control of acute pain  Outcome: Ongoing  Goal: Control of chronic pain  Description  Control of chronic pain  Outcome: Ongoing     Problem: Risk for Impaired Skin Integrity  Goal: Tissue integrity - skin and mucous membranes  Description  Structural intactness and normal physiological function of skin and  mucous membranes. 12/26/2019 2225 by Marcia Argueta RN  Outcome: Ongoing  12/26/2019 0932 by Hesham Dewey RN  Outcome: Ongoing  Note:   Skin assessment completed. Mepilex border placed on buttocks. Pt cleaned and miconazole administered to infected areas. Pt placed on specialty bed.       Problem: Falls - Risk of:  Goal: Will remain free from falls  Description  Will remain free from falls  Outcome: Met This Shift  Goal: Absence of physical injury  Description  Absence of physical injury  Outcome: Met This Shift     Problem: Discharge Planning:  Goal: Ability to perform activities of daily living will improve  Description  Ability to perform activities of daily living will improve  Outcome: Ongoing  Goal: Participates in care planning  Description  Participates in care planning  Outcome: Ongoing     Problem: Injury - Risk of, Physical Injury:  Goal: Will remain free from falls  Description  Will remain free from falls  Outcome: Met This Shift  Goal: Absence of physical injury  Description  Absence of physical injury  Outcome: Met This Shift     Problem: Mood - Altered:  Goal: Mood stable  Description  Mood stable  Outcome: Ongoing  Goal: Absence of abusive behavior  Description  Absence of abusive behavior  Outcome: Met This Shift  Goal: Verbalizations of feeling emotionally comfortable while being cared for will increase  Description  Verbalizations of feeling emotionally comfortable while being cared for will increase  Outcome: Not Met This Shift     Problem: Psychomotor Activity - Altered:  Goal: Absence of psychomotor disturbance signs and symptoms  Description  Absence of psychomotor disturbance signs and symptoms  Outcome: Met This Shift     Problem: Sensory Perception - Impaired:  Goal: Demonstrations of improved sensory functioning will increase  Description  Demonstrations of improved sensory functioning will increase  Outcome: Met This Shift  Goal: Decrease in sensory misperception frequency  Description  Decrease in sensory misperception frequency  Outcome: Met This Shift  Goal: Able to refrain from responding to false sensory perceptions  Description  Able to refrain from responding to false sensory perceptions  Outcome: Met This Shift  Goal: Demonstrates accurate environmental perceptions  Description  Demonstrates accurate environmental perceptions  Outcome: Met This Shift  Goal: Able to distinguish between reality-based and nonreality-based thinking  Description  Able to distinguish between reality-based and nonreality-based thinking  Outcome: Met This Shift  Goal: Able to interrupt nonreality-based thinking  Description  Able to interrupt nonreality-based thinking  Outcome: Met This Shift     Problem: Sleep Pattern Disturbance:  Goal: Appears well-rested  Description  Appears well-rested  Outcome: Met This Shift

## 2019-12-27 NOTE — PROGRESS NOTES
Speech Language Pathology  Attempted to see pt this date for dysphagia follow-up. Pt currently off the floor for procedure. Will attempt to check back later this date as schedule allows.       Thank you,    Anabella Quisper, [address]  Speech-Language Pathologist [phone]

## 2019-12-27 NOTE — BRIEF OP NOTE
Brief Postoperative Note    Renata Plan  YOB: 1945  [id number]    Pre-operative Diagnosis: infected port    Post-operative Diagnosis: Same    Procedure:   1. Port Removal  2. PICC Placement    Anesthesia: Moderate Sedation    Surgeons: Frances hCou MD    Estimated Blood Loss: Less than 5 mL    Complications: None    Specimens: Was Obtained: Port tip sent for culture    Findings: Successful placement of a PICC via the right brachial vein and removal of the left sided port.     Electronically signed by Frances Chou MD on 12/27/2019 at 12:04 PM

## 2019-12-27 NOTE — PROGRESS NOTES
[address]   Electrophysiology Progress Note     Date: 12/27/2019  Admit Date: 12/25/2019     Reason for consultation: Atrial fibrillation    Chief Complaint:   Chief Complaint   Patient presents with    Altered Mental Status     pt transported from [place] [address] by [facility] for altered mental status. nurse report stated patient was awake at breakfast, he was hard to get awake at 0930  and she was unable to give patient his morning meds. pt hx right hip fracture 15 days ago, being treated for fungal infection in right hip.  pt is a DNRCCA       History of Present Illness: History obtained from patient and medical record. Lorena Ceron is a 69 y.o. male with a past medical history of chronic permanent atrial fibrillation, history of CVA, hypertension, B-cell lymphoma. He was brought to the hospital with altered mental status. Reportedly he was not taking his medication, and had baseline change of mental status. Patient is poor historian and is unable to provide further history. Patient is DNR-CCA. He has had a recent fall and hip fracture but was a poor candidate for surgical correction. On admission he was in atrial fibrillation with mild tachycardia. Cardiology has been consulted for atrial fibrillation    Interval Hx: Today, he is being seen for follow-up. He remains in atrial fibrillation with mildly tachycardic rate with occasional rate up to 140s. He had his port removed yesterday due to concerns for possible device infection. He remains lethargic and unable to give any history. Pt is DNR-CCA. Patient seen and examined. Clinical notes reviewed. Telemetry reviewed. Allergies:  No Known Allergies    Home Meds:  Prior to Visit Medications    Medication Sig Taking? Authorizing Provider   citalopram (CELEXA) 20 MG tablet Take 20 mg by mouth daily  Historical Provider, MD   oxyCODONE (OXYCONTIN) 10 MG extended release tablet Take 10 mg by mouth every 12 hours. Historical Provider, MD   oxyCODONE-acetaminophen (PERCOCET) 5-325 MG per tablet Take 1 tablet by mouth every 6 hours as needed for Pain. Historical Provider, MD   ondansetron (ZOFRAN) 4 MG tablet Take 4 mg by mouth every 6 hours as needed for Nausea or Vomiting  Historical Provider, MD   melatonin 3 MG TABS tablet Take 6 mg by mouth nightly as needed (insomnia)  Historical Provider, MD   miconazole (MICOTIN) 2 % powder Apply topically 2 times daily. Trish Caicedo DO   spironolactone (ALDACTONE) 25 MG tablet Take 0.5 tablets by mouth daily  Trish Caicedo DO   torsemide (DEMADEX) 20 MG tablet Take 1 tablet by mouth daily  Trish Caicedo DO   aspirin EC 81 MG EC tablet Take 1 tablet by mouth 2 times daily Take for DVT blood clot prophylaxis. Please avoid missing doses.   Gail Lam, APRN - CNP   omeprazole (PRILOSEC) 20 MG delayed release capsule Take 20 mg by mouth daily  Historical Provider, MD   potassium chloride (MICRO-K) 10 MEQ extended release capsule Take 10 mEq by mouth daily  Historical Provider, MD   vitamin B-12 (CYANOCOBALAMIN) 100 MCG tablet Take 100 mcg by mouth daily  Historical Provider, MD   folic acid (FOLVITE) 1 MG tablet Take 1 mg by mouth daily  Historical Provider, MD   atorvastatin (LIPITOR) 20 MG tablet TAKE 1 TABLET BY MOUTH DAILY  Mitchell Cote MD   Emollient (DERMAPHOR) OINT ointment APPLY TOPICALLY TWICE DAILY AS NEEDED  Patient taking differently: APPLY TOPICALLY TWICE DAILY AS NEEDED FOR SKIN DRYNESS  Clive Plummer MD   rivaroxaban (XARELTO) 20 MG TABS tablet TAKE 1 TABLET BY MOUTH DAILY  Terra Loera MD   metoprolol succinate (TOPROL XL) 25 MG extended release tablet TAKE 1 TABLET BY MOUTH DAILY  [place] MD   ferrous sulfate (BEST-GLORIA) 325 (65 Fe) MG tablet Take 1 tablet by mouth 2 times daily  Clive Plummer MD   acetaminophen (TYLENOL) 325 MG tablet Take 650 mg by mouth every 6 hours as needed for Pain   Historical Provider, MD      Scheduled Meds:  Kaylene Fruits topical skin adhesive   Topical Once    metoprolol tartrate  50 mg Oral BID    enoxaparin  1 mg/kg Subcutaneous BID    aspirin  81 mg Oral Daily    atorvastatin  20 mg Oral Nightly    vancomycin  1,250 mg Intravenous Q12H    sodium chloride flush  10 mL Intravenous 2 times per day    lidocaine 1 % injection  5 mL Intradermal Once    sodium chloride flush  10 mL Intravenous 2 times per day    miconazole   Topical BID    pantoprazole  40 mg Intravenous Daily     Continuous Infusions:   sodium chloride 100 mL/hr at 12/26/19 2238     PRN Meds:sodium chloride bacteriostatic, acetaminophen, perflutren lipid microspheres, sodium chloride flush, sodium chloride flush, sodium chloride flush, acetaminophen, ondansetron, magnesium hydroxide, ondansetron, morphine, potassium chloride **OR** potassium alternative oral replacement **OR** potassium chloride     Past Medical History:  Past Medical History:   Diagnosis Date    [facility]     B-cell lymphoma [facility] [address] [facility]     Back pain     Cancer ([facility] [address] [facility]     Cerebral infarction ([facility] [address] [facility]     CHF (congestive heart failure) ([facility] [facility]     Chronic hepatitis C without hepatic coma ([facility] [address] [facility]     Chronic obstructive pulmonary disease ([facility] [address] [facility]     Hypertension     Hypokalemia     Infestation by bed bug     2018    MRSA colonization 12/04/2019    Obesity         Past Surgical History:    has a past surgical history that includes joint replacement (Right, 2010); knee surgery; fine needle aspiration; and Tunneled venous port placement (Left, 09/02/2016). Social History:  Reviewed. reports that he quit smoking about 8 years ago. He smoked 1.00 pack per day. He has never used smokeless tobacco. He reports that he does not drink alcohol or use drugs. Family History:  Reviewed. family history includes Alzheimer's Disease in his mother.      Review of Systems:  Unable to obtain    Physical Examination:  Vitals:    12/27/19 1250   BP:    Pulse:    Resp: 22   Temp:    SpO2: 98% In: 1400 [I.V.:1400]  Out: 1100    Wt Readings from Last 3 Encounters:   12/27/19 234 lb 12.8 oz (106.5 kg)   12/10/19 258 lb 2.5 oz (117.1 kg)   10/29/19 261 lb 14.5 oz (118.8 kg)       Intake/Output Summary (Last 24 hours) at 12/27/2019 1404  Last data filed at 12/27/2019 0636  Gross per 24 hour   Intake 1650 ml   Output 1300 ml   Net 350 ml       Telemetry: Personally Reviewed  - Atrial fibrillation   · Constitutional: Lethargic but arousable in no apparent distress, and appears ill  · Skin: Warm and pink; no pallor, cyanosis, bruising, or clubbing. + RUE PICC line  · HEENT: Symmetric and normocephalic. PERRL, EOM intact. Conjunctiva pink with clear sclera. Mucus membranes pink and moist. Teeth intact. Thyroid smooth without nodules or goiter. · Cardiovascular: Mildly Tachycardic rate and irregular rhythm. S1/S2 present without murmurs, rubs, or gallops. Peripheral pulses 2+, capillary refill < 3 seconds. No elevation of JVP. No peripheral edema  · Respiratory: Respirations symmetric and unlabored. Lungs clear to auscultation bilaterally, no wheezing, crackles, or rhonchi  · Gastrointestinal: Abdomen soft and round. Bowel sounds normoactive in all quadrants without tenderness or masses. · Musculoskeletal: Bilateral upper and lower extremity strength 4/5 with reduced ROM (L>R)  · Neurologic/Psych: Lethargic, but follows commands. + for dysarthria. Poor historian    Pertinent labs, diagnostic, device, and imaging results reviewed as a part of this visit    Labs:    BMP:   Recent Labs     12/25/19  2231 12/26/19  0452 12/27/19  0440    140 144   K 3.9 4.0 3.5    104 108   CO2 24 22 25   BUN 14 14 16   CREATININE 0.7* 0.6* 0.8   MG  --   --  1.90     Estimated Creatinine Clearance: 105 mL/min (based on SCr of 0.8 mg/dL).    CBC:   Recent Labs     12/25/19  1137 12/26/19  0452 12/27/19  0440   WBC 10.5 6.3 9.4   HGB 12.1* 12.5* 11.0*   HCT 37.2* 38.3* 33.9*   MCV 98.0 99.2 98.2    164 162 Thyroid:   Lab Results   Component Value Date    TSH 1.90 2019     Lipids:   Lab Results   Component Value Date    CHOL 91 05/15/2017    HDL 48 2019    TRIG 109 05/15/2017     LFTS:   Lab Results   Component Value Date    ALT 19 2019    AST 34 2019    ALKPHOS 136 2019    PROT 7.2 2019    PROT 7.0 2017    AGRATIO 0.6 2019    BILITOT 1.3 2019     Cardiac Enzymes:   Lab Results   Component Value Date    CKTOTAL 33 2019    TROPONINI 0.03 2019    TROPONINI 0.02 2019    TROPONINI <0.01 2019     Coags:   Lab Results   Component Value Date    PROTIME 18.6 2019    PROTIME 29.1 2010    INR 1.60 2019       EC19  AF RVR    Echo: 2016  Normal LV size and systolic function. Estimated ejection fraction is 60%. Trace mitral regurgitation is present. The left atrium is mildly dilated. The RV is mildly enlarged. RV systolic function is normal .  There is mild tricuspid regurgitation with RVSP estimated at 33 mmHg. The right atrium is mildly dilated. Trivial pulmonic regurgitation present     LHC: 2015  1. Dominant right coronary artery with about 20% to 30% proximal right  coronary artery stenosis. 2. Patent left main trunk. 3. Patent left anterior descending artery and its branches. 4. A 40% to 50% stenosis of the obtuse marginal branch of the circumflex  artery. 5. Left ventricular systolic function within normal limit left heart  Hemodynamics.     Stress Test: 10/13/2015  Abnormal study with reversible defects of the mid-anterolateral wall, apical lateral, apical anterior wall consistent with ischemia.  There is also a  reversible defect of the basal to mid inferior wall consistent with  ischemia.  Normal LV size and systolic function.  Patient is in atrial fibrillation at baseline       Problem List:   Patient Active Problem List    Diagnosis Date Noted    Cerebral infarction ([facility] [address] 2015 Priority: High    Anasarca 05/04/2015     Priority: High    Sepsis due to methicillin resistant Staphylococcus aureus (MRSA) ([facility] [address]     MRSA bacteremia     Port-A-Cath in place     Chronic hepatitis C without hepatic coma ([address]     History of stroke     Right hemiplegia (HCC)     Chronic obstructive pulmonary disease ([address]     AMS (altered mental status) 12/25/2019    Acute on chronic respiratory failure with hypercapnia (HCC)     Pleural effusion, bilateral     Pulmonary hypertension ([address]     Tinea cruris     Closed fracture of neck of right femur ([address]     Fall at home, initial encounter     Closed fracture of right hip ([address] 11/30/2019    Chest wall pain     Acute respiratory failure with hypoxia (HCC)     Pleural effusion 07/27/2019    Frequent falls 05/08/2019    Flexion contracture of right knee 03/01/2018    Right hemiparesis ([address] 02/01/2018    Venous stasis dermatitis of both lower extremities 04/04/2017    Patient nonadherence 04/04/2017    Cerebrovascular accident (CVA) ([address] 01/26/2017    Anemia due to bone marrow failure ([address] 01/26/2017    Debility 09/27/2016    Diffuse high grade B-cell lymphoma ([facility] [address] 08/30/2016    Chronic atrial fibrillation     Acute on chronic diastolic HF (heart failure) (HCC)     Bradycardia     Abnormal stress test 10/27/2015    CH[facility]congestive heart failure) ([facility]     Hypokalemia 05/05/2015    Class 1 obesity due to excess calories with body mass index (BMI) of 31.0 to 31.9 in adult 05/05/2015    Atrial fibrillation with RVR ([facility] [address] 05/04/2015        Assessment and Plan:     1. Chronic permanent Atrial Fibrillation  - Currently in atrial fibrillation, rate elevated 100-120s up to 140s at times   ~ Modest rate control is acceptable given acute issues (hip fx and sepsis); Rate <110    - Continue metoprolol to 50 mg BID   - Due to LVH and CAD, anti-arrhythmic therapies are limited to amiodarone.  Will load with amiodarone for additional rate control   ~ IV bolus 150 mg now; then 200 mg BID PO for one week, then reduce to 200 mg daily    - [id number] score: 4 (Age, CAD, CHF, HTN) ; [id number] Vasc score and anticoagulation discussed. High risk for stroke and thromboembolism. Anticoagulation is recommended. Risk of bleeding was discussed. ~ Currently on weight based lovenox for port removal and PICC placement  ~ Xarelto on hold. Resume home dose of xarelto 20 mg QD when able (CrCl ~ 105 ml/min based on creatinine of 0.8)    - Afib risk factors including age, HTN, obesity, inactivity and RACHELLE were discussed with patient. Risk factor modification recommended      - Treatment options including cardioversion, rate control strategy, antiarrhythmics, anticoagulation and possible ablation were discussed with patient. Risks, benefits and alternative of each treatment options were explained. All questions answered    ~ Due to extensive co-mordbidities and DNR status, plus long term nature of his AF, he is a poor candidate for invasive procedures (DCCV, ablation)    2. CAD  - Non-obstructive per [facility] (2015)  - Stable  - No complaints of angina  - Continue ASA, BB, and statin    3. HTN  - Controlled: Goal <130/80  - Continue current medications    4. Sepsis   - + for MRSA bacteremia on admission   - Port removed yesterday, tip sent for culture   - On ABX   - ID following    Multiple medical conditions with risk of decompensation. All pertinent information and plan of care discussed with the EP physician. All questions and concerns were addressed to the patient. Alternatives to my treatment were discussed. I have discussed the above stated plan with patient and the nurse. The patient verbalized understanding and agreed with the plan. Thank you for allowing to us to participate in the care of Spike Parham.     Blossom Zhao, KADY-CNP  [facility]   Office: [phone]

## 2019-12-28 LAB
ANION GAP SERPL CALCULATED.3IONS-SCNC: 7 MMOL/L (ref 3–16)
BASOPHILS ABSOLUTE: 0 K/UL (ref 0–0.2)
BASOPHILS RELATIVE PERCENT: 0.1 %
BLOOD CULTURE, ROUTINE: ABNORMAL
BUN BLDV-MCNC: 20 MG/DL (ref 7–20)
CALCIUM SERPL-MCNC: 8.3 MG/DL (ref 8.3–10.6)
CHLORIDE BLD-SCNC: 112 MMOL/L (ref 99–110)
CO2: 27 MMOL/L (ref 21–32)
CREAT SERPL-MCNC: 0.8 MG/DL (ref 0.8–1.3)
CULTURE, BLOOD 2: ABNORMAL
CULTURE, BLOOD 2: ABNORMAL
EOSINOPHILS ABSOLUTE: 0 K/UL (ref 0–0.6)
EOSINOPHILS RELATIVE PERCENT: 0 %
GFR AFRICAN AMERICAN: >60
GFR NON-AFRICAN AMERICAN: >60
GLUCOSE BLD-MCNC: 133 MG/DL (ref 70–99)
HCT VFR BLD CALC: 31.5 % (ref 40.5–52.5)
HEMOGLOBIN: 10.2 G/DL (ref 13.5–17.5)
LACTIC ACID: 1.2 MMOL/L (ref 0.4–2)
LYMPHOCYTES ABSOLUTE: 0.6 K/UL (ref 1–5.1)
LYMPHOCYTES RELATIVE PERCENT: 6.9 %
MAGNESIUM: 2.2 MG/DL (ref 1.8–2.4)
MCH RBC QN AUTO: 32.1 PG (ref 26–34)
MCHC RBC AUTO-ENTMCNC: 32.3 G/DL (ref 31–36)
MCV RBC AUTO: 99.2 FL (ref 80–100)
MONOCYTES ABSOLUTE: 0.6 K/UL (ref 0–1.3)
MONOCYTES RELATIVE PERCENT: 7.7 %
NEUTROPHILS ABSOLUTE: 7 K/UL (ref 1.7–7.7)
NEUTROPHILS RELATIVE PERCENT: 85.3 %
ORGANISM: ABNORMAL
PDW BLD-RTO: 17 % (ref 12.4–15.4)
PLATELET # BLD: 149 K/UL (ref 135–450)
PMV BLD AUTO: 8.9 FL (ref 5–10.5)
POTASSIUM REFLEX MAGNESIUM: 3.5 MMOL/L (ref 3.5–5.1)
PROCALCITONIN: 0.92 NG/ML (ref 0–0.15)
RBC # BLD: 3.18 M/UL (ref 4.2–5.9)
SODIUM BLD-SCNC: 146 MMOL/L (ref 136–145)
VANCOMYCIN TROUGH: 13.7 UG/ML (ref 10–20)
WBC # BLD: 8.2 K/UL (ref 4–11)

## 2019-12-28 PROCEDURE — 93308 TTE F-UP OR LMTD: CPT

## 2019-12-28 PROCEDURE — 92526 ORAL FUNCTION THERAPY: CPT

## 2019-12-28 PROCEDURE — 6360000002 HC RX W HCPCS: Performed by: NURSE PRACTITIONER

## 2019-12-28 PROCEDURE — 84145 PROCALCITONIN (PCT): CPT

## 2019-12-28 PROCEDURE — 87040 BLOOD CULTURE FOR BACTERIA: CPT

## 2019-12-28 PROCEDURE — 2580000003 HC RX 258: Performed by: INTERNAL MEDICINE

## 2019-12-28 PROCEDURE — 2580000003 HC RX 258: Performed by: NURSE PRACTITIONER

## 2019-12-28 PROCEDURE — 99233 SBSQ HOSP IP/OBS HIGH 50: CPT | Performed by: NURSE PRACTITIONER

## 2019-12-28 PROCEDURE — 36415 COLL VENOUS BLD VENIPUNCTURE: CPT

## 2019-12-28 PROCEDURE — 36592 COLLECT BLOOD FROM PICC: CPT

## 2019-12-28 PROCEDURE — 83605 ASSAY OF LACTIC ACID: CPT

## 2019-12-28 PROCEDURE — 83735 ASSAY OF MAGNESIUM: CPT

## 2019-12-28 PROCEDURE — 80202 ASSAY OF VANCOMYCIN: CPT

## 2019-12-28 PROCEDURE — 99232 SBSQ HOSP IP/OBS MODERATE 35: CPT | Performed by: PSYCHIATRY & NEUROLOGY

## 2019-12-28 PROCEDURE — 6360000002 HC RX W HCPCS: Performed by: INTERNAL MEDICINE

## 2019-12-28 PROCEDURE — 85025 COMPLETE CBC W/AUTO DIFF WBC: CPT

## 2019-12-28 PROCEDURE — 6370000000 HC RX 637 (ALT 250 FOR IP): Performed by: INTERNAL MEDICINE

## 2019-12-28 PROCEDURE — C9113 INJ PANTOPRAZOLE SODIUM, VIA: HCPCS | Performed by: NURSE PRACTITIONER

## 2019-12-28 PROCEDURE — 6370000000 HC RX 637 (ALT 250 FOR IP): Performed by: NURSE PRACTITIONER

## 2019-12-28 PROCEDURE — 36591 DRAW BLOOD OFF VENOUS DEVICE: CPT

## 2019-12-28 PROCEDURE — 2060000000 HC ICU INTERMEDIATE R&B

## 2019-12-28 PROCEDURE — 80048 BASIC METABOLIC PNL TOTAL CA: CPT

## 2019-12-28 RX ORDER — AMIODARONE HYDROCHLORIDE 200 MG/1
200 TABLET ORAL DAILY
Status: DISCONTINUED | OUTPATIENT
Start: 2020-01-04 | End: 2020-01-03 | Stop reason: HOSPADM

## 2019-12-28 RX ORDER — AMIODARONE HYDROCHLORIDE 200 MG/1
200 TABLET ORAL 2 TIMES DAILY
Status: DISCONTINUED | OUTPATIENT
Start: 2019-12-28 | End: 2020-01-03 | Stop reason: HOSPADM

## 2019-12-28 RX ADMIN — METOPROLOL TARTRATE 50 MG: 50 TABLET, FILM COATED ORAL at 09:34

## 2019-12-28 RX ADMIN — VANCOMYCIN HYDROCHLORIDE 1250 MG: 10 INJECTION, POWDER, LYOPHILIZED, FOR SOLUTION INTRAVENOUS at 01:42

## 2019-12-28 RX ADMIN — MICONAZOLE NITRATE: 20 POWDER TOPICAL at 21:50

## 2019-12-28 RX ADMIN — MICONAZOLE NITRATE: 20 POWDER TOPICAL at 09:38

## 2019-12-28 RX ADMIN — ENOXAPARIN SODIUM 120 MG: 120 INJECTION SUBCUTANEOUS at 09:35

## 2019-12-28 RX ADMIN — AMIODARONE HYDROCHLORIDE 200 MG: 200 TABLET ORAL at 21:46

## 2019-12-28 RX ADMIN — RIVAROXABAN 20 MG: 20 TABLET, FILM COATED ORAL at 22:20

## 2019-12-28 RX ADMIN — SODIUM CHLORIDE: 9 INJECTION, SOLUTION INTRAVENOUS at 07:16

## 2019-12-28 RX ADMIN — MORPHINE SULFATE 4 MG: 4 INJECTION, SOLUTION INTRAMUSCULAR; INTRAVENOUS at 15:30

## 2019-12-28 RX ADMIN — Medication 10 ML: at 21:50

## 2019-12-28 RX ADMIN — Medication 10 ML: at 09:37

## 2019-12-28 RX ADMIN — METOPROLOL TARTRATE 50 MG: 50 TABLET, FILM COATED ORAL at 23:09

## 2019-12-28 RX ADMIN — ATORVASTATIN CALCIUM 20 MG: 20 TABLET, FILM COATED ORAL at 21:46

## 2019-12-28 RX ADMIN — AMIODARONE HYDROCHLORIDE 200 MG: 200 TABLET ORAL at 13:22

## 2019-12-28 RX ADMIN — PANTOPRAZOLE SODIUM 40 MG: 40 INJECTION, POWDER, FOR SOLUTION INTRAVENOUS at 09:35

## 2019-12-28 RX ADMIN — DEXTROSE MONOHYDRATE 150 MG: 50 INJECTION, SOLUTION INTRAVENOUS at 13:10

## 2019-12-28 RX ADMIN — VANCOMYCIN HYDROCHLORIDE 1250 MG: 10 INJECTION, POWDER, LYOPHILIZED, FOR SOLUTION INTRAVENOUS at 16:24

## 2019-12-28 RX ADMIN — MORPHINE SULFATE 4 MG: 4 INJECTION, SOLUTION INTRAMUSCULAR; INTRAVENOUS at 08:15

## 2019-12-28 RX ADMIN — Medication 10 ML: at 09:36

## 2019-12-28 RX ADMIN — ASPIRIN 81 MG 81 MG: 81 TABLET ORAL at 09:34

## 2019-12-28 RX ADMIN — Medication 10 ML: at 19:30

## 2019-12-28 ASSESSMENT — PAIN SCALES - GENERAL
PAINLEVEL_OUTOF10: 9
PAINLEVEL_OUTOF10: 0
PAINLEVEL_OUTOF10: 8

## 2019-12-28 NOTE — PLAN OF CARE
Problem: Pain:  Goal: Control of acute pain  Description  Control of acute pain  Note:   Pt denies pain at rest. =     Problem: Risk for Impaired Skin Integrity  Goal: Tissue integrity - skin and mucous membranes  Description  Structural intactness and normal physiological function of skin and  mucous membranes. Intervention: SKIN ASSESSMENT  Note:   No new skin issues noted upon assessment. Miconazole powder to groin folds/creases as ordered. Problem: Risk for Impaired Skin Integrity  Goal: Tissue integrity - skin and mucous membranes  Description  Structural intactness and normal physiological function of skin and  mucous membranes. Intervention: TURN PATIENT  Note:   Pt turned and repositioned throughout shift. Remains on low air loss alternating mattress      Problem: Falls - Risk of:  Goal: Will remain free from falls  Description  Will remain free from falls  Note:   Patient is a high fall risk. Patient free of falls this shift. Bed low, locked and alarmed at all times. Call light and bedside table is within reach. Yellow blanket on bed, arm band on wrist and fall sign posted in the room. Notified patient to ask for assistance when needed. Patient verbalized understanding. Problem: Injury - Risk of, Physical Injury:  Goal: Will remain free from falls  Description  Will remain free from falls  Note:   Patient is a high fall risk. Patient free of falls this shift. Bed low, locked and alarmed at all times. Call light and bedside table is within reach. Yellow blanket on bed, arm band on wrist and fall sign posted in the room. Notified patient to ask for assistance when needed. Patient verbalized understanding. Problem: Confusion - Acute:  Goal: Absence of continued neurological deterioration signs and symptoms  Description  Absence of continued neurological deterioration signs and symptoms  Note:   Pt remains w/ ongoing garbled speech which is mostly unintelligible.  Pt does appear to nod appropriately at times.

## 2019-12-28 NOTE — PROGRESS NOTES
PRN        Objective:  /68   Pulse 101   Temp 98.3 °F (36.8 °C) (Temporal)   Resp 20   Ht 6' 1\" (1.854 m)   Wt 233 lb 8 oz (105.9 kg)   SpO2 97%   BMI 30.81 kg/m²     Intake/Output Summary (Last 24 hours) at 12/28/2019 1354  Last data filed at 12/28/2019 0454  Gross per 24 hour   Intake 2553 ml   Output 1225 ml   Net 1328 ml      Wt Readings from Last 3 Encounters:   12/28/19 233 lb 8 oz (105.9 kg)   12/10/19 258 lb 2.5 oz (117.1 kg)   10/29/19 261 lb 14.5 oz (118.8 kg)       General appearance:  Lethargic lying in bed AAOx2 not to time  HEENT: atraumatic, Pupils equal, muscous membranes moist, no masses appreciated  Cardiovascular: irregular irregular no murmurs appreciated  Respiratory: CTAB no wheezing  Gastrointestinal: Abdomen soft, non-tender, BS+  EXT: no edema  Neurology: dimished strength RUE and dysarthic speech    Skin: Warm, dry, no rashes appreciated    Labs and Tests:  CBC:   Recent Labs     12/26/19  0452 12/27/19  0440 12/28/19  0521   WBC 6.3 9.4 8.2   HGB 12.5* 11.0* 10.2*    162 149     BMP:    Recent Labs     12/26/19  0452 12/27/19  0440 12/28/19  0521    144 146*   K 4.0 3.5 3.5    108 112*   CO2 22 25 27   BUN 14 16 20   CREATININE 0.6* 0.8 0.8   GLUCOSE 156* 155* 133*     Hepatic:   Recent Labs     12/26/19  0452   AST 34   ALT 19   BILITOT 1.3*   ALKPHOS 136*     IR REMOVE TUNNELED CVAD W SQ PORT/PUMP INSERT   Final Result   Successful subcutaneous Port-A-Cath removal.         IR INSERT PICC VAD W SQ PORT >5 YEARS   Final Result   Successful ultrasound and fluoroscopy guided PICC placement         CT ABDOMEN PELVIS WO CONTRAST   Final Result   Acute right femoral neck fracture. Small right pleural effusion. Mild bibasilar atelectasis. No acute inflammatory abnormality is identified in the abdomen or pelvis. Cholelithiasis. MRI BRAIN W WO CONTRAST   Final Result   1. Motion degrades images limiting evaluation.    2. No convincing acute intracranial abnormality. No acute infarct. 3. Mild global parenchymal volume loss with chronic microvascular ischemic   changes. 4. Scattered mucosal thickening of the paranasal sinuses. 5. Left mastoid effusion. VL Extremity Venous Right   Final Result      CT Head WO Contrast   Final Result   No acute intracranial abnormality. XR CHEST PORTABLE   Final Result   Cardiomegaly with mild interstitial edema, small pleural effusions, and   bibasilar atelectasis             Recent imaging reviewed         Assessment & Plan:   Acute metabolic encephalopathy: Mri brain neg Resolving  Sepsis secondary to MRSA bacteremia secondary to port infection:  Port removed 12/27/19, picc placed, BC MRSA and port tip MRSA  - echo pending will f/u  - continue vanc  - repeat cultures in am  - stop fluids in am  - daily renal close eye on cr    PAF with RVR:  lorpessor amiodarone load  Stop lovenox start xarelto    Dysphagia:  Speech on board monitor for aspiraton  Dysphagia I pureed with mildly thickened liquids (nectar    Hx of cva: residual dysarthria  Hx of diffuse b cell lymphoma, reviewed on onc notes aug 2019: no signs of reccurence      Diet: DIET CARDIAC; Dysphagia Pureed; Mildly Thick (Nectar);  No Drinking Straw  Code:DNR-CCA  DVT PPX  xarelto  Disposition pending echo and repeat bcresults      Pavithra Frankel MD   12/28/2019 1:54 PM

## 2019-12-28 NOTE — PLAN OF CARE
Problem: Pain:  Goal: Pain level will decrease  Description  Pain level will decrease  Outcome: Ongoing     Problem: Pain:  Goal: Control of acute pain  Description  Control of acute pain  12/28/2019 0819 by Roge Gonsalez RN  Outcome: Ongoing  12/28/2019 0708 by Vahid Grajeda  Note:   Pt denies pain at rest. =     Problem: Risk for Impaired Skin Integrity  Goal: Tissue integrity - skin and mucous membranes  Description  Structural intactness and normal physiological function of skin and  mucous membranes. Intervention: TURN PATIENT  12/28/2019 0708 by Vahid Grajeda  Note:   Pt turned and repositioned throughout shift.  Remains on low air loss alternating mattress

## 2019-12-28 NOTE — PROGRESS NOTES
NEUROLOGY FOLLOWUP    HISTORY OF PRESENT ILLNESS :     Jensen Vann is a 69 y.o. male   History was obtained from the dictations in the chart. Patient continues to have severe dysarthria and is having difficulty giving a detailed history. Patient was able to tell me that he is feeling better today. However speech was difficult to understand because of his dysarthria. Detailed history: Patient has a history of atrial fibrillation and is on Xarelto. There is history of lymphoma. Patient has had a previous left hemispheric stroke with mild right hemiparesis and aphasia. Patient was brought to the hospital with altered mental status and increased confusion as compared to his baseline. Patient apparently fell 15 days ago and had a right hip fracture    REVIEW OF SYSTEMS       Constitutional:  []   Chills   [x]  Fatigue   []  Fevers   [x]  Malaise   []  Weight loss     [] Denies all of the above    Respiratory:   []  Cough    []  Shortness of breath         [x] Denies all of the above     Cardiovascular:   []  Chest pain    []  Exertional chest pressure/discomfort           [] Palpitations    []  Syncope     [x] Denies all of the above    Past Medical History:   Diagnosis Date    Atrial fibrillation ([address]     B-cell lymphoma ([address]     Back pain     Cancer ([address]     Cerebral infarction ([address]     CHF (congestive heart failure) ([address]     Chronic hepatitis C without hepatic coma ([address]     Chronic obstructive pulmonary disease ([address]     Hypertension     Hypokalemia     Infestation by bed bug     2018    MRSA colonization 12/04/2019    Obesity      Family History   Problem Relation Age of Onset    Alzheimer's Disease Mother      Social History     Socioeconomic History    Marital status:       Spouse name: None    Number of children: None    Years of education: None    Highest education level: None Occupational History    None   Social Needs    Financial resource strain: None    Food insecurity:     Worry: None     Inability: None    Transportation needs:     Medical: None     Non-medical: None   Tobacco Use    Smoking status: Former Smoker     Packs/day: 1.00     Last attempt to quit: 2011     Years since quittin.6    Smokeless tobacco: Never Used   Substance and Sexual Activity    Alcohol use: No     Alcohol/week: 0.0 standard drinks    Drug use: No    Sexual activity: Never   Lifestyle    Physical activity:     Days per week: None     Minutes per session: None    Stress: None   Relationships    Social connections:     Talks on phone: None     Gets together: None     Attends [employer] service: None     Active member of club or organization: None     Attends meetings of clubs or organizations: None     Relationship status: None    Intimate partner violence:     Fear of current or ex partner: None     Emotionally abused: None     Physically abused: None     Forced sexual activity: None   Other Topics Concern    None   Social History Narrative    None        PHYSICAL EXAMINATION      /68   Pulse 101   Temp 98.3 °F (36.8 °C) (Temporal)   Resp 20   Ht 6' 1\" (1.854 m)   Wt 233 lb 8 oz (105.9 kg)   SpO2 97%   BMI 30.81 kg/m²   This is a well-nourished patient in no acute distress  Seems awake and alert. Speech is very dysarthric. Could not participate in detailed mental status testing. Pupils equal round reactive to light. Extraocular movements intact. Visual fields could not be tested. Mild right facial droop. Tongue midline. Moves all 4 extremities but there is a very mild right hemiparesis. Plantar reflexes withdrawal.  Could not cooperate for sensory or coordination testing. Unable to ambulate at this time. No carotid bruit. No neck stiffness.     DATA :  LABS:  General Labs:    CBC:   Lab Results   Component Value Date    WBC 8.2 2019    RBC 3.18 12/28/2019    RBC 3.84 06/01/2017    HGB 10.2 12/28/2019    HCT 31.5 12/28/2019    MCV 99.2 12/28/2019    MCH 32.1 12/28/2019    MCHC 32.3 12/28/2019    RDW 17.0 12/28/2019     12/28/2019    MPV 8.9 12/28/2019     BMP:    Lab Results   Component Value Date     12/28/2019    K 3.5 12/28/2019     12/28/2019    CO2 27 12/28/2019    BUN 20 12/28/2019    LABALBU 2.7 12/26/2019    CREATININE 0.8 12/28/2019    CALCIUM 8.3 12/28/2019    GFRAA >60 12/28/2019    LABGLOM >60 12/28/2019    GLUCOSE 133 12/28/2019    GLUCOSE 114 06/01/2017     RADIOLOGY REVIEW:  I have reviewed radiology image(s) and reports(s) of: CT scan of the head      IMPRESSION :  Acute encephalopathy probably due to MRSA sepsis  Blood cultures are suggesting septicemia with MRSA  Previous left hemisphere stroke with mild right hemiparesis and significant dysarthria and aphasia  Atrial fibrillation on Xarelto  Patient Active Problem List   Diagnosis    Anasarca    Atrial fibrillation [facility] RVR ([facility])    Cerebral infarction ([address]    Hypokalemia    Class 1 obesity due to excess calories with body mass index (BMI) of 31.0 to 31.9 in adult    CHF (congestive heart failure) ([facility])    Abnormal stress test    Chronic atrial fibrillation    Acute on chronic diastolic HF (heart failure) (HCC)    Bradycardia    Diffuse high grade B-cell lymphoma ([address]    Debility    Cerebrovascular accident (CVA) ([address]    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)    Venous stasis dermatitis of both lower extremities    Patient nonadherence    Right hemiparesis ([facility])    Flexion contracture of right knee    Frequent falls    Pleural effusion    Acute respiratory failure with hypoxia (HCC)    Chest wall pain    Closed fracture of right hip (HCC)    Closed fracture of neck of right femur ([address]    Fall at home, initial encounter    Tinea cruris    Acute on chronic respiratory failure with hypercapnia (HCC)    Pleural effusion, bilateral    [facility]monary [facility]    AMS (altered mental status)    Sepsis due to methicillin resistant Staphylococcus aureus (MRSA) ([facility]    MRSA bacteremia    Port-A-Cath in place    Chronic hepatitis C without hepatic coma (HCC)    History of stroke    Right hemiplegia (HCC)    Chronic obstructive pulmonary disease (HCC)       RECOMMENDATIONS :  Continue antibiotics as per infectious disease consultant  Continue Xarelto   Since patient's neurological status is stable at this time, I will sign off and see him on a as needed basis. Please call if needed. Please note a portion of this chart was generated using dragon dictation software. Although every effort was made to ensure the accuracy of this automated transcription, some errors in transcription may have occurred.          Kizzy Vivas M.D.

## 2019-12-29 LAB
ANION GAP SERPL CALCULATED.3IONS-SCNC: 8 MMOL/L (ref 3–16)
BASOPHILS ABSOLUTE: 0 K/UL (ref 0–0.2)
BASOPHILS RELATIVE PERCENT: 0 %
BUN BLDV-MCNC: 27 MG/DL (ref 7–20)
CALCIUM SERPL-MCNC: 8.5 MG/DL (ref 8.3–10.6)
CHLORIDE BLD-SCNC: 116 MMOL/L (ref 99–110)
CO2: 26 MMOL/L (ref 21–32)
CREAT SERPL-MCNC: 0.8 MG/DL (ref 0.8–1.3)
CULTURE CATHETER TIP: ABNORMAL
EOSINOPHILS ABSOLUTE: 0 K/UL (ref 0–0.6)
EOSINOPHILS RELATIVE PERCENT: 0.6 %
GFR AFRICAN AMERICAN: >60
GFR NON-AFRICAN AMERICAN: >60
GLUCOSE BLD-MCNC: 137 MG/DL (ref 70–99)
HCT VFR BLD CALC: 31 % (ref 40.5–52.5)
HEMOGLOBIN: 9.8 G/DL (ref 13.5–17.5)
LYMPHOCYTES ABSOLUTE: 0.5 K/UL (ref 1–5.1)
LYMPHOCYTES RELATIVE PERCENT: 7.5 %
MCH RBC QN AUTO: 31.7 PG (ref 26–34)
MCHC RBC AUTO-ENTMCNC: 31.5 G/DL (ref 31–36)
MCV RBC AUTO: 100.5 FL (ref 80–100)
MONOCYTES ABSOLUTE: 0.6 K/UL (ref 0–1.3)
MONOCYTES RELATIVE PERCENT: 7.8 %
NEUTROPHILS ABSOLUTE: 6.1 K/UL (ref 1.7–7.7)
NEUTROPHILS RELATIVE PERCENT: 84.1 %
ORGANISM: ABNORMAL
PDW BLD-RTO: 17.5 % (ref 12.4–15.4)
PLATELET # BLD: 136 K/UL (ref 135–450)
PMV BLD AUTO: 9.4 FL (ref 5–10.5)
POTASSIUM REFLEX MAGNESIUM: 3.9 MMOL/L (ref 3.5–5.1)
RBC # BLD: 3.08 M/UL (ref 4.2–5.9)
SODIUM BLD-SCNC: 150 MMOL/L (ref 136–145)
WBC # BLD: 7.3 K/UL (ref 4–11)

## 2019-12-29 PROCEDURE — 6370000000 HC RX 637 (ALT 250 FOR IP): Performed by: NURSE PRACTITIONER

## 2019-12-29 PROCEDURE — C9113 INJ PANTOPRAZOLE SODIUM, VIA: HCPCS | Performed by: NURSE PRACTITIONER

## 2019-12-29 PROCEDURE — 2060000000 HC ICU INTERMEDIATE R&B

## 2019-12-29 PROCEDURE — 36415 COLL VENOUS BLD VENIPUNCTURE: CPT

## 2019-12-29 PROCEDURE — 6370000000 HC RX 637 (ALT 250 FOR IP): Performed by: INTERNAL MEDICINE

## 2019-12-29 PROCEDURE — 2580000003 HC RX 258: Performed by: NURSE PRACTITIONER

## 2019-12-29 PROCEDURE — 2580000003 HC RX 258: Performed by: INTERNAL MEDICINE

## 2019-12-29 PROCEDURE — 80048 BASIC METABOLIC PNL TOTAL CA: CPT

## 2019-12-29 PROCEDURE — 6360000002 HC RX W HCPCS: Performed by: NURSE PRACTITIONER

## 2019-12-29 PROCEDURE — 99233 SBSQ HOSP IP/OBS HIGH 50: CPT | Performed by: INTERNAL MEDICINE

## 2019-12-29 PROCEDURE — 6360000002 HC RX W HCPCS: Performed by: INTERNAL MEDICINE

## 2019-12-29 PROCEDURE — 36592 COLLECT BLOOD FROM PICC: CPT

## 2019-12-29 PROCEDURE — 85025 COMPLETE CBC W/AUTO DIFF WBC: CPT

## 2019-12-29 PROCEDURE — 87040 BLOOD CULTURE FOR BACTERIA: CPT

## 2019-12-29 RX ORDER — LORAZEPAM 0.5 MG/1
0.5 TABLET ORAL EVERY 6 HOURS PRN
Status: DISCONTINUED | OUTPATIENT
Start: 2019-12-29 | End: 2019-12-29

## 2019-12-29 RX ADMIN — DEXTROSE AND SODIUM CHLORIDE: 5; 450 INJECTION, SOLUTION INTRAVENOUS at 13:00

## 2019-12-29 RX ADMIN — Medication 10 ML: at 21:24

## 2019-12-29 RX ADMIN — PANTOPRAZOLE SODIUM 40 MG: 40 INJECTION, POWDER, FOR SOLUTION INTRAVENOUS at 10:02

## 2019-12-29 RX ADMIN — MORPHINE SULFATE 4 MG: 4 INJECTION, SOLUTION INTRAMUSCULAR; INTRAVENOUS at 11:39

## 2019-12-29 RX ADMIN — Medication 10 ML: at 10:03

## 2019-12-29 RX ADMIN — AMIODARONE HYDROCHLORIDE 200 MG: 200 TABLET ORAL at 21:25

## 2019-12-29 RX ADMIN — RIVAROXABAN 20 MG: 20 TABLET, FILM COATED ORAL at 17:28

## 2019-12-29 RX ADMIN — AMIODARONE HYDROCHLORIDE 200 MG: 200 TABLET ORAL at 10:02

## 2019-12-29 RX ADMIN — MORPHINE SULFATE 4 MG: 4 INJECTION, SOLUTION INTRAMUSCULAR; INTRAVENOUS at 17:55

## 2019-12-29 RX ADMIN — SODIUM CHLORIDE: 9 INJECTION, SOLUTION INTRAVENOUS at 01:36

## 2019-12-29 RX ADMIN — VANCOMYCIN HYDROCHLORIDE 1250 MG: 10 INJECTION, POWDER, LYOPHILIZED, FOR SOLUTION INTRAVENOUS at 14:54

## 2019-12-29 RX ADMIN — ACETAMINOPHEN 650 MG: 325 TABLET, FILM COATED ORAL at 21:24

## 2019-12-29 RX ADMIN — ACETAMINOPHEN 650 MG: 325 TABLET, FILM COATED ORAL at 12:45

## 2019-12-29 RX ADMIN — ASPIRIN 81 MG 81 MG: 81 TABLET ORAL at 10:02

## 2019-12-29 RX ADMIN — METOPROLOL TARTRATE 50 MG: 50 TABLET, FILM COATED ORAL at 10:02

## 2019-12-29 RX ADMIN — METOPROLOL TARTRATE 50 MG: 50 TABLET, FILM COATED ORAL at 23:55

## 2019-12-29 RX ADMIN — ATORVASTATIN CALCIUM 20 MG: 20 TABLET, FILM COATED ORAL at 21:24

## 2019-12-29 RX ADMIN — MICONAZOLE NITRATE: 20 POWDER TOPICAL at 21:24

## 2019-12-29 RX ADMIN — VANCOMYCIN HYDROCHLORIDE 1250 MG: 10 INJECTION, POWDER, LYOPHILIZED, FOR SOLUTION INTRAVENOUS at 01:41

## 2019-12-29 RX ADMIN — MICONAZOLE NITRATE: 20 POWDER TOPICAL at 10:05

## 2019-12-29 ASSESSMENT — ENCOUNTER SYMPTOMS
SHORTNESS OF BREATH: 0
BACK PAIN: 0
SORE THROAT: 0
WHEEZING: 0
EYE DISCHARGE: 0
NAUSEA: 0
DIARRHEA: 0
CONSTIPATION: 0
ABDOMINAL PAIN: 0
COUGH: 0
TROUBLE SWALLOWING: 0
EYE REDNESS: 0
RHINORRHEA: 0

## 2019-12-29 ASSESSMENT — PAIN DESCRIPTION - FREQUENCY: FREQUENCY: CONTINUOUS

## 2019-12-29 ASSESSMENT — PAIN SCALES - GENERAL
PAINLEVEL_OUTOF10: 3
PAINLEVEL_OUTOF10: 0
PAINLEVEL_OUTOF10: 0
PAINLEVEL_OUTOF10: 4
PAINLEVEL_OUTOF10: 8
PAINLEVEL_OUTOF10: 8
PAINLEVEL_OUTOF10: 3
PAINLEVEL_OUTOF10: 0
PAINLEVEL_OUTOF10: 8

## 2019-12-29 ASSESSMENT — PAIN DESCRIPTION - DESCRIPTORS: DESCRIPTORS: ACHING

## 2019-12-29 ASSESSMENT — PAIN DESCRIPTION - PAIN TYPE: TYPE: CHRONIC PAIN

## 2019-12-29 ASSESSMENT — PAIN DESCRIPTION - LOCATION: LOCATION: NECK

## 2019-12-29 NOTE — PROGRESS NOTES
[address] Daily Progress Note      Admit Date:  12/25/2019    Subjective:  Mr. Tolu Fragoso is  Seen by cardiology for MRSA bacteremia and possible endocarditis  Initial consult by Dr Anthony Myers as follows   HPI: Pennie Carrizales is a 69 y.o. male chronic permanent atrial fibrillation, history of CVA, hypertension, B-cell lymphoma who is been brought to the hospital with altered mental status. Reportedly he was not taking his medication, and had baseline change of mental status. Patient is poor historian is not providing further history. Patient is DNR CCA. He has had a recent fall and hip fracture but was a poor candidate for surgical correction. On admission he was in atrial fibrillation with mild tachycardia. Cardiology has been consulted for atrial fibrillation      ROS:  12 point ROS negative in all areas as listed below except as in [address]  Constitutional, EENT, Cardiovascular, pulmonary, GI, , Musculoskeletal, skin, neurological, hematological, endocrine, Psychiatric    Past Medical History:   Diagnosis Date    Atrial fibrillation ([address]     B-cell lymphoma ([address]     Back pain     Cancer ([address]     Cerebral infarction ([address]     CHF (congestive heart failure) ([address]     Chronic hepatitis C without hepatic coma ([address]     Chronic obstructive pulmonary disease ([address]     Hypertension     Hypokalemia     Infestation by bed bug     2018    MRSA colonization 12/04/2019    Obesity      Past Surgical History:   Procedure Laterality Date    FINE NEEDLE ASPIRATION      JOINT REPLACEMENT Right 2010    right TKA Knee via Right    KNEE SURGERY      TUNNELED VENOUS PORT PLACEMENT Left 09/02/2016    DR. BAIG, ANGIODYNAMIC POWER PORT       Objective:   /62   Pulse 98   Temp 97.1 °F (36.2 °C) (Temporal)   Resp 16   Ht 6' 1\" (1.854 m)   Wt 235 lb 12.8 oz (107 kg)   SpO2 95%   BMI 31.11 kg/m²       Intake/Output Summary (Last 24 hours) at 12/29/2019 1301  Last data filed at 12/29/2019 Abnormal stress test 10/27/2015    [facility]     Hypokalemia 05/05/2015    Class 1 obesity due to excess calories with body mass index (BMI) of 31.0 to 31.9 in adult 05/05/2015    Atrial fibrillation with RVR ([address] 05/04/2015       Plan:  1. RHYS tomorrow  Procedure  Indications risks benefits alternatives discussed understood and agreed all  Questions answered will proceed tomorrow by my associates   Continue antibiotics   · I have spent 35  minutes of face to face time with the patient with more than 50% spent counseling and coordinating care for this patient  ·     Mecca Ramirez MD 12/29/2019 1:01 PM

## 2019-12-29 NOTE — PROGRESS NOTES
Infectious Diseases   Progress Note      Admission Date: 12/25/2019  Hospital Day: Hospital Day: 5   Attending: Ladi Carreon MD  Date of service: 12/29/2019     Chief complaint/ Reason for consult: The patient was seen today for the following:    · Sepsis with high fever  · Complicated MRSA bacteremia  · Severe tinea cruris  · Need for contact isolation  · Diffuse large B-cell lymphoma  · Port-A-Cath in place in the chest  · Chronic hepatitis C    Microbiology:        I have reviewed allavailable micro lab data and cultures    · Blood culture (2/2) - collected on 73/00/8880: MRSA  Complicated      Antibiotics and immunizations:       Current antibiotics: All antibiotics and their doses were reviewed by me    Recent Abx Admin                   vancomycin (VANCOCIN) 1,250 mg in dextrose 5 % 250 mL IVPB (mg) 1,250 mg New Bag 12/29/19 1454     1,250 mg New Bag  0141     1,250 mg New Bag 12/28/19 1624                  Immunization History: All immunization history was reviewed by me today. Immunization History   Administered Date(s) Administered    Pneumococcal Conjugate 13-valent ([id number]) 07/25/2016    Pneumococcal Polysaccharide ([id number]) 02/01/2018    Tdap (Boostrix, Adacel) 03/08/2019       Known drug allergies: All allergies were reviewed and updated    No Known Allergies    Social history:     Social History:  All social andepidemiologic history was reviewed and updated by me today as needed. · Tobacco use:   reports that he quit smoking about 8 years ago. He smoked 1.00 pack per day. He has never used smokeless tobacco.  · Alcohol use:   reports no history of alcohol use. · Currently lives in: [address] [place] [address]  ·  reports no history of drug use.        Assessment:     The patient is a 69 y.o. old male who  has a past medical history of Atrial fibrillation ([address] B-cell lymphoma ([address] Back pain, Cancer ([address] Cerebral infarction ([address] CHF (congestive heart failure) ([address] Chronic hepatitis C without hepatic coma (HCC), Chronic obstructive pulmonary disease ([facility] Hypertension, Hypokalemia, Infestation by bed bug, MRSA colonization (12/04/2019), and Obesity. with following problems:    · Sepsis with high fever -slightly better  · Complicated MRSA bacteremia-ongoing  · Severe tinea cruris-covered with IV fluconazole  · Need for contact isolation  · Diffuse large B-cell lymphoma  · Port-A-Cath in place in the chest  · Chronic hepatitis C  · History of stroke   · Chronic right hemiplegia  · Chronic right hip fracture  · History of right total knee arthroplasty in the past  · COPD  · Chronic atrial fibrillation  · Obesity Class 1 due to excess calorie intake : Body mass index is 31.11 kg/m². Discussion:      The patient had a Port-A-Cath removed on 12/27/2019. Port-A-Cath tip culture is growing MRSA. Unfortunately, had a PICC line placed on 12/27/2019. Blood culture from 12/27/2019 is still growing MRSA. Repeat blood culture from yesterday wa is in process    Serum sodium is 150.  2D echo reviewed. No obvious valvular vegetation. Severe TR noted. Plan:     Diagnostic Workup:    · Cardiology note reviewed. We will follow-up on RHYS  · Continue to follow  fever curve, WBC count and blood cultures  · We will order 2 sets of repeat blood cultures today given persistent bacteremia    Antimicrobials:    · Will continue IV vancomycin  Check Vancomycin trough before the 5th dose. Target vancomycin trough of around 15. Keep vancomycin trough between 15 and 20. Continue to monitor serum creatinine and Vanco levels closely, while the patient is on I/v Vancomycin. · Contact isolation for MRSA  · Persistent bacteremia is concerning.   If patient stays bacteremic for longer than a week, will consider switching to salvage therapy  · The PICC line was placed on 12/27/2019 was patient was still bacteremic and will need to be removed before discharge for source control  · Continue to monitor his vitals closely  · Fall precaution  · Aspiration precaution  · DVT prophylaxis  · Discussed all above with patient and RN  · Discussed with Dr. Андрей Caputo    Drug Monitoring:    · Continue monitoring for antibiotic toxicity as follows: Vancomycin trough, CMP  · Continue to watch for following: new or worsening fever, new hypotension, hives, lip swelling and redness or purulence at vascular access sites. I/v access Management:    · Continue to monitor i.v access sites for erythema, induration, discharge or tenderness. · As always, continue efforts to minimize tubes/lines/drains as clinically appropriate to reduce chances of line associated infections. Patient education and counseling:       · The patient was educated in detail about the side-effects of various antibiotics and things to watch for like new rashes, lip swelling, severe reaction, worsening diarrhea, break through fever etc.  · Discussed patient's condition and what to expect. All of the patient's questions were addressed in a satisfactory manner and patient verbalized understanding all instructions. Level of complexity of visit: High     Risk of Complications/Morbidity: High     · Illness(es)/ Infection present that pose threat to life/bodily function. · There is potential for severe exacerbation of infection/side effects of treatment. · Therapy requires intensive monitoring for antimicrobial agent toxicity. TIME SPENT TODAY:     - Spent over  35  minutes on visit (including interval history, physical exam, review of data including labs, cultures, imaging, development and implementation of treatment plan and coordination of complex care). - Over 50% of time spent with patient face to face on counseling and education. Thank you for involving me in the care of your patient. I will continue to follow. If you have anyadditional questions, please do not hesitate to contact me. Subjective:      Interval history: Patient was seen and examined at bedside. Interval history was obtained. He is more awake today. He is tolerating antibiotics okay. No diarrhea. Has a PICC line in place in the right arm now     REVIEW OF SYSTEMS:    Review of Systems   Constitutional: Positive for fatigue. Negative for chills, diaphoresis and fever. HENT: Negative for ear discharge, ear pain, rhinorrhea, sore throat and trouble swallowing. Eyes: Negative for discharge and redness. Respiratory: Negative for cough, shortness of breath and wheezing. Cardiovascular: Negative for chest pain and leg swelling. Gastrointestinal: Negative for abdominal pain, constipation, diarrhea and nausea. Endocrine: Negative for polyuria. Genitourinary: Negative for dysuria, flank pain, frequency, hematuria and urgency. Musculoskeletal: Negative for back pain and myalgias. Skin: Negative for rash. Neurological: Negative for dizziness, seizures and headaches. Hematological: Does not bruise/bleed easily. Psychiatric/Behavioral: Negative for hallucinations and suicidal ideas. All other systems reviewed and are negative. Past Medical History: All past medical history reviewed today. Past Medical History:   Diagnosis Date    At[facility] ([facility]     B-cell lymphoma (HCC)     Back pain     Cancer (HCC)     Cerebral infarction ([facility]     CHF (congestive heart failure) (HCC)     Chronic hepatitis C without hepatic coma (HCC)     Chronic obstructive pulmonary disease (HCC)     Hypertension     Hypokalemia     Infestation by bed bug     2018    MRSA colonization 12/04/2019    Obesity        Past Surgical History: All past surgical history was reviewed today. Past Surgical History:   Procedure Laterality Date    FINE NEEDLE ASPIRATION      JOINT REPLACEMENT Right 2010    right TKA Knee via Right    KNEE SURGERY      TUNNELED VENOUS PORT PLACEMENT Left 09/02/2016    DR. Vira George [facility] History: All family history was reviewed today. Problem Relation Age of Onset    Alzheimer's Disease Mother        Objective:       PHYSICAL EXAM:      Vitals:   Vitals:    12/29/19 0635 12/29/19 0745 12/29/19 0807 12/29/19 1200   BP:  (!) 99/57  103/62   Pulse:  97  98   Resp:  16  16   Temp:  97.3 °F (36.3 °C)  97.1 °F (36.2 °C)   TempSrc:  Temporal  Temporal   SpO2: 94% 94%  95%   Weight:   235 lb 12.8 oz (107 kg)    Height:           Physical Exam  Vitals signs and nursing note reviewed. Constitutional:       Appearance: Normal appearance. He is well-developed. Comments: Appears tired   HENT:      Head: Normocephalic and atraumatic. Right Ear: External ear normal.      Left Ear: External ear normal.      Nose: Nose normal. No congestion or rhinorrhea. Mouth/Throat:      Mouth: Mucous membranes are moist.      Pharynx: No oropharyngeal exudate or posterior oropharyngeal erythema. Eyes:      General: No scleral icterus. Right eye: No discharge. Left eye: No discharge. Conjunctiva/sclera: Conjunctivae normal.      Pupils: Pupils are equal, round, and reactive to light. Neck:      Musculoskeletal: Normal range of motion and neck supple. No neck rigidity or muscular tenderness. Cardiovascular:      Rate and Rhythm: Normal rate and regular rhythm. Pulses: Normal pulses. Heart sounds: No murmur. No friction rub. Pulmonary:      Effort: Pulmonary effort is normal. No respiratory distress. Breath sounds: Normal breath sounds. No stridor. No wheezing, rhonchi or rales. Abdominal:      General: Bowel sounds are normal.      Palpations: Abdomen is soft. Tenderness: There is no tenderness. There is no right CVA tenderness, left CVA tenderness, guarding or rebound. Musculoskeletal: Normal range of motion. General: No swelling or tenderness. Lymphadenopathy:      Cervical: No cervical adenopathy. Skin:     General: Skin is warm and dry. fluoroscopy guided PICC placement         CT ABDOMEN PELVIS WO CONTRAST   Final Result   Acute right femoral neck fracture. Small right pleural effusion. Mild bibasilar atelectasis. No acute inflammatory abnormality is identified in the abdomen or pelvis. Cholelithiasis. MRI BRAIN W WO CONTRAST   Final Result   1. Motion degrades images limiting evaluation. 2. No convincing acute intracranial abnormality. No acute infarct. 3. Mild global parenchymal volume loss with chronic microvascular ischemic   changes. 4. Scattered mucosal thickening of the paranasal sinuses. 5. Left mastoid effusion. VL Extremity Venous Right   Final Result      CT Head WO Contrast   Final Result   No acute intracranial abnormality. XR CHEST PORTABLE   Final Result   Cardiomegaly with mild interstitial edema, small pleural effusions, and   bibasilar atelectasis             Medications: All current and past medications were reviewed.      amiodarone  200 mg Oral BID    [START ON 1/4/2020] amiodarone  200 mg Oral Daily    rivaroxaban  20 mg Oral Daily    metoprolol tartrate  50 mg Oral BID    aspirin  81 mg Oral Daily    atorvastatin  20 mg Oral Nightly    vancomycin  1,250 mg Intravenous Q12H    sodium chloride flush  10 mL Intravenous 2 times per day    lidocaine 1 % injection  5 mL Intradermal Once    sodium chloride flush  10 mL Intravenous 2 times per day    miconazole   Topical BID    pantoprazole  40 mg Intravenous Daily        IV infusion builder 100 mL/hr at 12/29/19 1300       sodium chloride bacteriostatic, acetaminophen, perflutren lipid microspheres, sodium chloride flush, sodium chloride flush, sodium chloride flush, acetaminophen, ondansetron, magnesium hydroxide, ondansetron, morphine, potassium chloride **OR** potassium alternative oral replacement **OR** potassium chloride      Problem list:       Patient Active Problem List   Diagnosis Code    Anasarca R60.1    signature. Any pictures or media included in this note were obtained after taking informed verbal consent from the patient and with their approval to include those in the patient's medical record.     Rod Lara MD, MPH  12/29/2019 , 3:17 PM   [facility] Infectious Disease   Office: [phone]  Fax: [phone]  Tuesday AM clinic:   [address] [place] [address]  Thursday AM [id number] Monse Artemas

## 2019-12-29 NOTE — PROGRESS NOTES
[facility]    12/29/2019 10:40 AM        Name: Alix Kidd . Admitted: 12/25/2019  Primary Care Provider: Hadley Mclean MD (Tel: [phone])     Cc: altered mentation  H/o: sent  From  SNF with altered mentation. Found to have port infection and MRSA infections. Port removed. picc placed the same day. Subjective:      Pt is more alert today per nursing. Not eating much. PICC in rt arm , asking why all liquids are thickened.      Reviewed interval ancillary notes    Current Medications  dextrose 5 % and 0.45 % NaCl 1,000 mL infusion, Continuous  amiodarone (CORDARONE) tablet 200 mg, BID  [START ON 1/4/2020] amiodarone (CORDARONE) tablet 200 mg, Daily  rivaroxaban (XARELTO) tablet 20 mg, Daily  sodium chloride bacteriostatic 0.9 % injection 15 mL, PRN  metoprolol tartrate (LOPRESSOR) tablet 50 mg, BID  acetaminophen (TYLENOL) suppository 650 mg, Q6H PRN  aspirin chewable tablet 81 mg, Daily  atorvastatin (LIPITOR) tablet 20 mg, Nightly  vancomycin (VANCOCIN) 1,250 mg in dextrose 5 % 250 mL IVPB, Q12H  perflutren lipid microspheres (DEFINITY) injection 1.65 mg, ONCE PRN  sodium chloride flush 0.9 % injection 10 mL, 2 times per day  sodium chloride flush 0.9 % injection 10 mL, PRN  sodium chloride flush 0.9 % injection 20 mL, ONCE PRN  lidocaine PF 1 % injection 5 mL, Once  sodium chloride flush 0.9 % injection 10 mL, 2 times per day  sodium chloride flush 0.9 % injection 10 mL, PRN  miconazole (MICOTIN) 2 % powder, BID  acetaminophen (TYLENOL) tablet 650 mg, Q6H PRN  ondansetron (ZOFRAN-ODT) disintegrating tablet 4 mg, Q6H PRN  magnesium hydroxide (MILK OF MAGNESIA) 400 MG/5ML suspension 30 mL, Daily PRN  ondansetron (ZOFRAN) injection 4 mg, Q6H PRN  morphine injection 4 mg, Q4H PRN  pantoprazole (PROTONIX) injection 40 mg, Daily  potassium chloride (KLOR-CON M) extended release tablet 40 mEq, PRN    Or  potassium bicarb-citric acid (EFFER-K) effervescent tablet 40 mEq, PRN    Or  potassium chloride 10 mEq/100 mL IVPB (Peripheral Line), PRN        Objective:  BP (!) 99/57   Pulse 97   Temp 97.3 °F (36.3 °C) (Temporal)   Resp 16   Ht 6' 1\" (1.854 m)   Wt 235 lb 12.8 oz (107 kg)   SpO2 94%   BMI 31.11 kg/m²     Intake/Output Summary (Last 24 hours) at 12/29/2019 1040  Last data filed at 12/29/2019 0601  Gross per 24 hour   Intake 3444 ml   Output 1125 ml   Net 2319 ml      Wt Readings from Last 3 Encounters:   12/29/19 235 lb 12.8 oz (107 kg)   12/10/19 258 lb 2.5 oz (117.1 kg)   10/29/19 261 lb 14.5 oz (118.8 kg)       General appearance:    lying in bed    HEENT: atraumatic, Pupils equal, muscous membranes moist, no masses appreciated; rt facial droop noted. Cardiovascular: irregular irregular,  no murmurs appreciated  Respiratory: CTAB no wheezing  Gastrointestinal: Abdomen soft, non-tender, BS+  EXT: no edema  Neurology: dimished strength RUE and dysarthic speech- from prior stroke    Skin: Warm, dry, no rashes appreciated    Labs and Tests:  CBC:   Recent Labs     12/27/19  0440 12/28/19  0521 12/29/19  0550   WBC 9.4 8.2 7.3   HGB 11.0* 10.2* 9.8*    149 136     BMP:    Recent Labs     12/27/19  0440 12/28/19  0521 12/29/19  0550    146* 150*   K 3.5 3.5 3.9    112* 116*   CO2 25 27 26   BUN 16 20 27*   CREATININE 0.8 0.8 0.8   GLUCOSE 155* 133* 137*     Hepatic:   No results for input(s): AST, ALT, ALB, BILITOT, ALKPHOS in the last 72 hours. IR REMOVE TUNNELED CVAD W SQ PORT/PUMP INSERT   Final Result   Successful subcutaneous Port-A-Cath removal.         IR INSERT PICC VAD W SQ PORT >5 YEARS   Final Result   Successful ultrasound and fluoroscopy guided PICC placement         CT ABDOMEN PELVIS WO CONTRAST   Final Result   Acute right femoral neck fracture. Small right pleural effusion. Mild bibasilar atelectasis.       No acute inflammatory abnormality is identified

## 2019-12-30 LAB
ANION GAP SERPL CALCULATED.3IONS-SCNC: 8 MMOL/L (ref 3–16)
ANION GAP SERPL CALCULATED.3IONS-SCNC: 9 MMOL/L (ref 3–16)
ANION GAP SERPL CALCULATED.3IONS-SCNC: 9 MMOL/L (ref 3–16)
BASOPHILS ABSOLUTE: 0 K/UL (ref 0–0.2)
BASOPHILS RELATIVE PERCENT: 0.1 %
BUN BLDV-MCNC: 23 MG/DL (ref 7–20)
BUN BLDV-MCNC: 24 MG/DL (ref 7–20)
BUN BLDV-MCNC: 25 MG/DL (ref 7–20)
CALCIUM SERPL-MCNC: 8.1 MG/DL (ref 8.3–10.6)
CALCIUM SERPL-MCNC: 8.1 MG/DL (ref 8.3–10.6)
CALCIUM SERPL-MCNC: 8.4 MG/DL (ref 8.3–10.6)
CHLORIDE BLD-SCNC: 107 MMOL/L (ref 99–110)
CHLORIDE BLD-SCNC: 108 MMOL/L (ref 99–110)
CHLORIDE BLD-SCNC: 108 MMOL/L (ref 99–110)
CO2: 24 MMOL/L (ref 21–32)
CREAT SERPL-MCNC: 0.9 MG/DL (ref 0.8–1.3)
CULTURE, BLOOD 2: ABNORMAL
EOSINOPHILS ABSOLUTE: 0.3 K/UL (ref 0–0.6)
EOSINOPHILS RELATIVE PERCENT: 4.5 %
GFR AFRICAN AMERICAN: >60
GFR NON-AFRICAN AMERICAN: >60
GLUCOSE BLD-MCNC: 122 MG/DL (ref 70–99)
GLUCOSE BLD-MCNC: 156 MG/DL (ref 70–99)
GLUCOSE BLD-MCNC: 176 MG/DL (ref 70–99)
HCT VFR BLD CALC: 30.4 % (ref 40.5–52.5)
HEMOGLOBIN: 9.7 G/DL (ref 13.5–17.5)
LV EF: 58 %
LVEF MODALITY: NORMAL
LYMPHOCYTES ABSOLUTE: 0.7 K/UL (ref 1–5.1)
LYMPHOCYTES RELATIVE PERCENT: 11.3 %
MCH RBC QN AUTO: 31.9 PG (ref 26–34)
MCHC RBC AUTO-ENTMCNC: 32 G/DL (ref 31–36)
MCV RBC AUTO: 99.7 FL (ref 80–100)
MONOCYTES ABSOLUTE: 0.6 K/UL (ref 0–1.3)
MONOCYTES RELATIVE PERCENT: 9.5 %
NEUTROPHILS ABSOLUTE: 4.3 K/UL (ref 1.7–7.7)
NEUTROPHILS RELATIVE PERCENT: 74.6 %
ORGANISM: ABNORMAL
PDW BLD-RTO: 17.2 % (ref 12.4–15.4)
PLATELET # BLD: 120 K/UL (ref 135–450)
PMV BLD AUTO: 9.7 FL (ref 5–10.5)
POTASSIUM REFLEX MAGNESIUM: 3.9 MMOL/L (ref 3.5–5.1)
POTASSIUM SERPL-SCNC: 3.8 MMOL/L (ref 3.5–5.1)
POTASSIUM SERPL-SCNC: 4.3 MMOL/L (ref 3.5–5.1)
PROCALCITONIN: 0.51 NG/ML (ref 0–0.15)
RBC # BLD: 3.05 M/UL (ref 4.2–5.9)
SODIUM BLD-SCNC: 140 MMOL/L (ref 136–145)
SODIUM BLD-SCNC: 140 MMOL/L (ref 136–145)
SODIUM BLD-SCNC: 141 MMOL/L (ref 136–145)
WBC # BLD: 5.8 K/UL (ref 4–11)

## 2019-12-30 PROCEDURE — 97161 PT EVAL LOW COMPLEX 20 MIN: CPT

## 2019-12-30 PROCEDURE — 7100000010 HC PHASE II RECOVERY - FIRST 15 MIN

## 2019-12-30 PROCEDURE — 36415 COLL VENOUS BLD VENIPUNCTURE: CPT

## 2019-12-30 PROCEDURE — 6370000000 HC RX 637 (ALT 250 FOR IP): Performed by: INTERNAL MEDICINE

## 2019-12-30 PROCEDURE — 97165 OT EVAL LOW COMPLEX 30 MIN: CPT

## 2019-12-30 PROCEDURE — 84145 PROCALCITONIN (PCT): CPT

## 2019-12-30 PROCEDURE — 99152 MOD SED SAME PHYS/QHP 5/>YRS: CPT

## 2019-12-30 PROCEDURE — 2060000000 HC ICU INTERMEDIATE R&B

## 2019-12-30 PROCEDURE — 99153 MOD SED SAME PHYS/QHP EA: CPT

## 2019-12-30 PROCEDURE — 94760 N-INVAS EAR/PLS OXIMETRY 1: CPT

## 2019-12-30 PROCEDURE — 6360000002 HC RX W HCPCS: Performed by: INTERNAL MEDICINE

## 2019-12-30 PROCEDURE — 6370000000 HC RX 637 (ALT 250 FOR IP): Performed by: NURSE PRACTITIONER

## 2019-12-30 PROCEDURE — 2580000003 HC RX 258: Performed by: INTERNAL MEDICINE

## 2019-12-30 PROCEDURE — 97530 THERAPEUTIC ACTIVITIES: CPT

## 2019-12-30 PROCEDURE — 93320 DOPPLER ECHO COMPLETE: CPT

## 2019-12-30 PROCEDURE — 97535 SELF CARE MNGMENT TRAINING: CPT

## 2019-12-30 PROCEDURE — B24BZZ4 ULTRASONOGRAPHY OF HEART WITH AORTA, TRANSESOPHAGEAL: ICD-10-PCS | Performed by: INTERNAL MEDICINE

## 2019-12-30 PROCEDURE — 87040 BLOOD CULTURE FOR BACTERIA: CPT

## 2019-12-30 PROCEDURE — 80048 BASIC METABOLIC PNL TOTAL CA: CPT

## 2019-12-30 PROCEDURE — 99233 SBSQ HOSP IP/OBS HIGH 50: CPT | Performed by: INTERNAL MEDICINE

## 2019-12-30 PROCEDURE — 93312 ECHO TRANSESOPHAGEAL: CPT

## 2019-12-30 PROCEDURE — 93325 DOPPLER ECHO COLOR FLOW MAPG: CPT

## 2019-12-30 PROCEDURE — 85025 COMPLETE CBC W/AUTO DIFF WBC: CPT

## 2019-12-30 PROCEDURE — 2580000003 HC RX 258: Performed by: NURSE PRACTITIONER

## 2019-12-30 PROCEDURE — 6360000002 HC RX W HCPCS: Performed by: NURSE PRACTITIONER

## 2019-12-30 RX ADMIN — RIVAROXABAN 20 MG: 20 TABLET, FILM COATED ORAL at 18:13

## 2019-12-30 RX ADMIN — MICONAZOLE NITRATE: 20 POWDER TOPICAL at 10:11

## 2019-12-30 RX ADMIN — MICONAZOLE NITRATE: 20 POWDER TOPICAL at 20:34

## 2019-12-30 RX ADMIN — Medication 10 ML: at 20:31

## 2019-12-30 RX ADMIN — Medication 10 ML: at 09:10

## 2019-12-30 RX ADMIN — METOPROLOL TARTRATE 50 MG: 50 TABLET, FILM COATED ORAL at 20:30

## 2019-12-30 RX ADMIN — VANCOMYCIN HYDROCHLORIDE 1250 MG: 10 INJECTION, POWDER, LYOPHILIZED, FOR SOLUTION INTRAVENOUS at 01:35

## 2019-12-30 RX ADMIN — MORPHINE SULFATE 4 MG: 4 INJECTION, SOLUTION INTRAMUSCULAR; INTRAVENOUS at 14:40

## 2019-12-30 RX ADMIN — DEXTROSE AND SODIUM CHLORIDE: 5; 450 INJECTION, SOLUTION INTRAVENOUS at 00:49

## 2019-12-30 RX ADMIN — Medication 10 ML: at 20:32

## 2019-12-30 RX ADMIN — Medication 10 ML: at 09:00

## 2019-12-30 RX ADMIN — ATORVASTATIN CALCIUM 20 MG: 20 TABLET, FILM COATED ORAL at 20:30

## 2019-12-30 RX ADMIN — VANCOMYCIN HYDROCHLORIDE 1250 MG: 10 INJECTION, POWDER, LYOPHILIZED, FOR SOLUTION INTRAVENOUS at 14:32

## 2019-12-30 RX ADMIN — MORPHINE SULFATE 4 MG: 4 INJECTION, SOLUTION INTRAMUSCULAR; INTRAVENOUS at 20:31

## 2019-12-30 RX ADMIN — ACETAMINOPHEN 650 MG: 325 TABLET, FILM COATED ORAL at 22:45

## 2019-12-30 RX ADMIN — AMIODARONE HYDROCHLORIDE 200 MG: 200 TABLET ORAL at 20:30

## 2019-12-30 ASSESSMENT — PAIN SCALES - GENERAL
PAINLEVEL_OUTOF10: 0
PAINLEVEL_OUTOF10: 8
PAINLEVEL_OUTOF10: 0
PAINLEVEL_OUTOF10: 6
PAINLEVEL_OUTOF10: 4
PAINLEVEL_OUTOF10: 0
PAINLEVEL_OUTOF10: 0
PAINLEVEL_OUTOF10: 4
PAINLEVEL_OUTOF10: 5
PAINLEVEL_OUTOF10: 7
PAINLEVEL_OUTOF10: 0

## 2019-12-30 ASSESSMENT — PAIN DESCRIPTION - ONSET
ONSET: ON-GOING

## 2019-12-30 ASSESSMENT — PAIN DESCRIPTION - ORIENTATION
ORIENTATION: LOWER
ORIENTATION: LOWER
ORIENTATION: RIGHT
ORIENTATION: LOWER

## 2019-12-30 ASSESSMENT — PAIN DESCRIPTION - LOCATION
LOCATION: BACK
LOCATION: BACK
LOCATION: HIP
LOCATION: BACK

## 2019-12-30 ASSESSMENT — PAIN DESCRIPTION - PAIN TYPE
TYPE: CHRONIC PAIN
TYPE: CHRONIC PAIN
TYPE: ACUTE PAIN;CHRONIC PAIN
TYPE: ACUTE PAIN;CHRONIC PAIN

## 2019-12-30 ASSESSMENT — PAIN DESCRIPTION - FREQUENCY
FREQUENCY: CONTINUOUS

## 2019-12-30 ASSESSMENT — PAIN DESCRIPTION - DESCRIPTORS
DESCRIPTORS: ACHING;CONSTANT
DESCRIPTORS: ACHING
DESCRIPTORS: ACHING;CONSTANT

## 2019-12-30 NOTE — PROGRESS NOTES
use.  · Currently lives in:  [facility]  ·  reports no history of drug use. Assessment:     The patient is a 69 y.o. old male who  has a past medical history of Atrial fibrillation ([address] B-cell lymphoma ([address] Back pain, Cancer ([facility] [address] Cerebral infarction ([facility] [address] CHF (congestive heart failure) ([facility] [address] Chronic hepatitis C without hepatic coma ([facility] [address] Chronic obstructive pulmonary disease ([facility] [address] Hypertension, Hypokalemia, Infestation by bed bug, MRSA colonization (12/04/2019), and Obesity. with following problems:    · Sepsis with high fever-resolved  · Complicated MRSA bacteremia-covered with IV vancomycin  · Severe tinea cruris-currently on fluconazole  · Need for contact isolation  · Diffuse large B-cell lymphoma  · Port-A-Cath in place in the chest  · Chronic hepatitis C  · History of stroke   · Chronic right hemiplegia  · Chronic right hip fracture  · History of right total knee arthroplasty in the past  · COPD  · Chronic atrial fibrillation  · Obesity Class 1 due to excess calorie intake : Body mass index is 31.36 kg/m². Discussion:      The patient is on IV vancomycin. Blood culture from 12/27/2019 and 12/28/2019 are still growing MRSA. Blood culture from yesterday is in process. Serum creatinine 0.9. White cell count is 5800.     Plan:     Diagnostic Workup:    · Follow-up on the results  · Continue to follow  fever curve, WBC count and blood cultures  · Follow up on vancomycin trough, liver and renal function  · We will order one set of blood culture today    Antimicrobials:    · Will continue IV vancomycin  · Target vancomycin trough level of 15-20  · Given persistent bacteremia, endocarditis is very likely  · We will follow-up on the culture results and will make further recommendations accordingly  · Contact isolation for MRSA  · Existing PICC line was placed and patient was still bacteremic and will need to be removed before discharge  · DVT prophylaxis  · Discussed all above mL Intradermal Once    sodium chloride flush  10 mL Intravenous 2 times per day    miconazole   Topical BID    pantoprazole  40 mg Intravenous Daily        IV infusion builder 100 mL/hr at 12/30/19 0049       sodium chloride bacteriostatic, acetaminophen, perflutren lipid microspheres, sodium chloride flush, sodium chloride flush, sodium chloride flush, acetaminophen, ondansetron, magnesium hydroxide, ondansetron, morphine, potassium chloride **OR** potassium alternative oral replacement **OR** potassium chloride      Problem list:       Patient Active Problem List   Diagnosis Code    Anasarca R60.1    Atrial fibrillation with RVR ([facility] [address] I48.91    Cerebral infarction ([facility] I63.9    Hypokalemia E87.6    Class 1 obesity due to excess calories with body mass index (BMI) of 31.0 to 31.9 in adult E66.09, Z68.31    CHF (congestive heart failure) ([facility]) I50.9    Abnormal stress test R94.39    Chronic atrial fibrillation I48.20    Acute on chronic diastolic HF (heart failure) ([facility]) I50.33    Bradycardia R00.1    Diffuse high grade B-cell lymphoma ([facility]) C83.30    Debility R53.81    Cerebrovascular accident (CVA) ([facility] I63.9    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 Sherrell Car, Y92.009    Tinea cruris B35.6    Acute on chronic respiratory failure with hypercapnia ([facility]) J96.22    Pleural effusion, bilateral J90    Pulmonary hypertension ([facility]) I27.20    AMS (altered mental status) R41.82    Sepsis due to methicillin resistant Staphylococcus aureus (MRSA) ([facility] [address]

## 2019-12-30 NOTE — PROGRESS NOTES
tablet 40 mEq, PRN    Or  potassium bicarb-citric acid (EFFER-K) effervescent tablet 40 mEq, PRN    Or  potassium chloride 10 mEq/100 mL IVPB (Peripheral Line), PRN        Objective:  /66   Pulse 84   Temp 97.2 °F (36.2 °C) (Temporal)   Resp 16   Ht 6' 1\" (1.854 m)   Wt 237 lb 11.2 oz (107.8 kg)   SpO2 98%   BMI 31.36 kg/m²     Intake/Output Summary (Last 24 hours) at 12/30/2019 1426  Last data filed at 12/30/2019 0429  Gross per 24 hour   Intake 2547 ml   Output 975 ml   Net 1572 ml      Wt Readings from Last 3 Encounters:   12/30/19 237 lb 11.2 oz (107.8 kg)   12/10/19 258 lb 2.5 oz (117.1 kg)   10/29/19 261 lb 14.5 oz (118.8 kg)       General appearance:    lying in bed    HEENT: atraumatic, Pupils equal, muscous membranes moist, no masses appreciated; rt facial droop noted. Cardiovascular: irregular irregular,  no murmurs appreciated  Respiratory: CTAB no wheezing  Gastrointestinal: Abdomen soft, non-tender, BS+  EXT: no edema  Neurology: dimished strength RUE and dysarthic speech- from prior stroke    Skin: Warm, dry, no rashes appreciated    Labs and Tests:  CBC:   Recent Labs     12/28/19  0521 12/29/19  0550 12/30/19  0545   WBC 8.2 7.3 5.8   HGB 10.2* 9.8* 9.7*    136 120*     BMP:    Recent Labs     12/29/19  0550 12/29/19  2345 12/30/19  0545   * 140 140   K 3.9 3.8 3.9   * 107 108   CO2 26 24 24   BUN 27* 25* 24*   CREATININE 0.8 0.9 0.9   GLUCOSE 137* 176* 156*     Hepatic:   No results for input(s): AST, ALT, ALB, BILITOT, ALKPHOS in the last 72 hours. IR REMOVE TUNNELED CVAD W SQ PORT/PUMP INSERT   Final Result   Successful subcutaneous Port-A-Cath removal.         IR INSERT PICC VAD W SQ PORT >5 YEARS   Final Result   Successful ultrasound and fluoroscopy guided PICC placement         CT ABDOMEN PELVIS WO CONTRAST   Final Result   Acute right femoral neck fracture. Small right pleural effusion. Mild bibasilar atelectasis.       No acute inflammatory abnormality is identified in the abdomen or pelvis. Cholelithiasis. MRI BRAIN W WO CONTRAST   Final Result   1. Motion degrades images limiting evaluation. 2. No convincing acute intracranial abnormality. No acute infarct. 3. Mild global parenchymal volume loss with chronic microvascular ischemic   changes. 4. Scattered mucosal thickening of the paranasal sinuses. 5. Left mastoid effusion. VL Extremity Venous Right   Final Result      CT Head WO Contrast   Final Result   No acute intracranial abnormality. XR CHEST PORTABLE   Final Result   Cardiomegaly with mild interstitial edema, small pleural effusions, and   bibasilar atelectasis             Recent imaging reviewed         Assessment & Plan:      Acute metabolic encephalopathy: Mri brain neg . Sec to bacteremia. Resolving. Mentation  back to baseline      Sepsis secondary to MRSA bacteremia secondary to port infection:  Port removed 12/27/19, picc placed at the same time?- repeat blood cx on 12.28- 1/2 showing MRSA. Tip cx- MRSA. -  no vegetations but showed severe TR compared to mild on echo few months ago. RHYS pending today   - continue vanc- BARBARA is 1.   - may need to remove PICC - decide on timing per ID    Hypernatremia: improving. Decrease fluid rate. PAF with RVR:  Rate now controlled. Cont lorpessor and amio per EP. Cont  xarelto    Dysphagia:  Speech on board.   Dysphagia I pureed with mildly thickened liquids (nectar thick)    Hx of cva: residual dysarthria    Hx of diffuse b cell lymphoma, reviewed on onc notes aug 2019: no signs of reccurence      Diet: Diet NPO, After Midnight  Code:DNR-CCA  DVT PPX  xarelto  Ptot: order  Disposition : inpt 2 more days atleast       Franky Lanza MD   12/30/2019 2:26 PM

## 2019-12-30 NOTE — PROGRESS NOTES
Physical Therapy    Facility/Department: [address]  Initial Assessment    NAME: Chandu Rey  : 1945  MRN: [id number]    Date of Service: 2019    Discharge Recommendations:Ru Gresham scored a  on the AM-PAC short mobility form. At this time, no further PT is recommended upon discharge due to dependence secondary to limiting pain from acute/subactute R hip fracture awaiting repair once infection controlled. Recommend patient returns to prior setting with prior services. PT Equipment Recommendations  Equipment Needed: No    Assessment   Body structures, Functions, Activity limitations: Decreased functional mobility ; Decreased ADL status; Decreased ROM; Decreased strength;Decreased cognition;Decreased endurance;Decreased balance; Increased pain  Assessment: Pt presents as below his baseline function, secondary to recent R hip fracture. However, pt extremely limited in tolerance of any mobility secondary to pain in R hip, as awaiting recovery from septic infections for potential R hip hemiarthroplasty per previous ortho notes. As such, pt unable to tolerate therapy services at this time. Would recommend returning to [facility] with prior services in hopes of impending hip surgery in order to continue therapy services at such time  Prognosis: Fair  Decision Making: Medium Complexity  PT Education: Goals;Plan of Care;PT Role  Patient Education: D/C recommendations  No Skilled PT: At baseline function  REQUIRES PT FOLLOW UP: No  Activity Tolerance  Activity Tolerance: Patient limited by pain       Patient Diagnosis(es): The primary encounter diagnosis was Atrial fibrillation with RVR ([facility] [address] A diagnosis of Altered mental status, unspecified altered mental status type was also pertinent to this visit.      has a past medical history of Atrial fibrillation ([address] B-cell lymphoma ([address] Back pain, Cancer ([address] Cerebral infarction ([facility] [address] CHF (congestive heart failure) ([facility] [address] Chronic

## 2019-12-30 NOTE — PROGRESS NOTES
Occupational Therapy   Occupational Therapy Initial Assessment/Discharge summary  Date: 2019   Patient Name: Chandu Rey  MRN: [id number]     : 1945    Date of Service: 2019    Discharge Recommendations:Ru Gresham scored a  on the [facility] ADL Inpatient form. At this time, no further OT is recommended upon discharge due to patient dependent with ADLS and unable to tolerate therapy due to pain from unrepaired hip fracture. Recommend patient returns to prior setting with prior services. OT Equipment Recommendations  Equipment Needed: No    Assessment   Performance deficits / Impairments: Decreased functional mobility ; Decreased strength;Decreased endurance;Decreased ADL status; Decreased cognition  Assessment: Patient presents with the above deficits impacting occupational performance. Due to pain from unrepaired hip fracture patient unable to tolerate therapy at this time. Treatment Diagnosis: sepsis  Prognosis: Poor  Decision Making: Low Complexity  Patient Education: OT role, discharge, weight bearing  REQUIRES OT FOLLOW UP: No  Activity Tolerance  Activity Tolerance: Patient limited by pain  Safety Devices  Safety Devices in place: Yes  Type of devices: All fall risk precautions in place; Bed alarm in place;Call light within reach; Left in bed;Nurse notified           Patient Diagnosis(es): The primary encounter diagnosis was Atrial fibrillation with RVR ([facility] [address] A diagnosis of Altered mental status, unspecified altered mental status type was also pertinent to this visit. has a past medical history of Atrial fibrillation ([address] B-cell lymphoma ([address] Back pain, Cancer ([address] Cerebral infarction ([address] CHF (congestive heart failure) ([address] Chronic hepatitis C without hepatic coma ([address] Chronic obstructive pulmonary disease ([address] Hypertension, Hypokalemia, Infestation by bed bug, MRSA colonization, and Obesity.    has a past surgical history that includes joint replacement (Right, 2010); knee surgery; fine needle aspiration; and Tunneled venous port placement (Left, 09/02/2016). Treatment Diagnosis: sepsis      Restrictions  Restrictions/Precautions  Restrictions/Precautions: Fall Risk, Up as Tolerated, Weight Bearing  Lower Extremity Weight Bearing Restrictions  Right Lower Extremity Weight Bearing: Non Weight Bearing  Position Activity Restriction  Other position/activity restrictions: Natalya Hughes is a 69 y.o. male with past medical history of atrial fibrillation on Xarelto, lymphoma, previous CVA, CHF, hypertension and obesity who presents to the ED with altered mental status. Patient with unrepaired R hip fracture due to MRSA sepsis. Subjective   General  Chart Reviewed: Yes  Additional Pertinent Hx: CVA, R hip fracture unrepaired (possible girdlestone produre when infection clears)  Family / Caregiver Present: No  Diagnosis: sepsis  Subjective  Subjective: In bed, agreeable to attempt. Pain 4/10 at rest.  JOSE Fuller Res notified.   Patient Currently in Pain: Yes  Pain Assessment  Pain Assessment: 0-10  Pain Level: 4  Pain Type: Chronic pain  Pain Location: Hip  Pain Orientation: Right  Response to Pain Intervention: Patient Satisfied  Pre Treatment Pain Screening  Intervention List: Nurse/Physician notified  O2 Device: None (Room air)  Social/Functional History  Social/Functional History  Lives With: Alone  Type of Home: House  Home Layout: One level  Home Access: Stairs to enter without rails  Entrance Stairs - Number of Steps: 1+1 HAROLDO  Bathroom Shower/Tub: (pt does not get into shower)  Bathroom Toilet: Standard  Bathroom Accessibility: Walker accessible  Home Equipment: Rolling walker, Wheelchair-manual, Lift chair, Hospital bed, Alert Button, Grab bars  Receives Help From: (HHA/OT)  Homemaking Responsibilities: (daughter grocery shops; uses microwave)  Ambulation Assistance: Needs assistance(w/c as primary means, walking 5 ft)  Transfer Assistance: (stand pivot with RW)  Active : No       Objective   Vision: Impaired  Vision Exceptions: Wears glasses at all times  Hearing: Within functional limits    Orientation  Overall Orientation Status: Within Functional Limits     Balance  Sitting Balance: Unable to assess(comment)(unable to tolerate due to pain)  Standing Balance: Unable to assess(comment)(unable to tolerate due to pain)  ADL  Feeding: NPO  Grooming: Moderate assistance  UE Bathing: Moderate assistance  LE Bathing: Dependent/Total  UE Dressing: Moderate assistance  LE Dressing: Dependent/Total  Toileting: Dependent/Total  Tone RUE  RUE Tone: Hypotonic  Tone LUE  LUE Tone: Normotonic  Coordination  Coordination and Movement description: Decreased speed;Decreased accuracy; Right UE;Left UE     Bed mobility  Rolling to Left: Dependent/Total;2 Person assistance  Scooting: Dependent/Total        Cognition  Overall Cognitive Status: Exceptions  Arousal/Alertness: Appropriate responses to stimuli  Following Commands:  Follows one step commands with repetition  Attention Span: Difficulty attending to directions  Memory: Decreased recall of recent events;Decreased short term memory  Safety Judgement: Decreased awareness of need for assistance;Decreased awareness of need for safety  Problem Solving: Decreased awareness of errors  Insights: Decreased awareness of deficits  Initiation: Requires cues for all  Sequencing: Requires cues for all                 LUE AROM (degrees)  LUE General AROM: Limited B UE AROM due to weakness L<R  RUE AROM (degrees)  RUE General AROM: Limited B UE AROM due to weakness L<R                      Plan   Plan  Times per week: eval only      AM-PAC Score        AM-[facility] Inpatient Daily Activity Raw Score: 9 (12/30/19 1259)  AM-PAC Inpatient ADL T-Scale Score : 25.33 (12/30/19 1259)  ADL Inpatient CMS 0-100% Score: 79.59 (12/30/19 1259)  ADL Inpatient CMS G-Code Modifier : CL (12/30/19 1259)    Goals  Short term goals  Time Frame for Short

## 2019-12-30 NOTE — PRE SEDATION
Sedation Pre-Procedure Note    Patient Name: Barak Mcclelland   YOB: 1945  Room/Bed: 1AI-8365/2184-18  Medical Record Number: [id number]  Date: 12/30/2019   Time: 1:26 PM       Indication:  endocarditis    Consent: I have discussed with the patient and/or the patient representative the indication, alternatives, and the possible risks and/or complications of the planned procedure and the anesthesia methods. The patient and/or patient representative appear to understand and agree to proceed. Vital Signs:   Vitals:    12/30/19 1230   BP: 112/66   Pulse: 84   Resp: 16   Temp: 97.2 °F (36.2 °C)   SpO2: 98%       Past Medical History:   has a past medical history of Atrial fibrillation ([facility] B-cell lymphoma ([facility] Back pain, Cancer ([address] Cerebral infarction ([facility] CHF (congestive heart failure) ([facility] Chronic hepatitis C without hepatic coma ([address] Chronic obstructive pulmonary disease ([address] Hypertension, Hypokalemia, Infestation by bed bug, MRSA colonization, and Obesity. Past Surgical History:   has a past surgical history that includes joint replacement (Right, 2010); knee surgery; fine needle aspiration; and Tunneled venous port placement (Left, 09/02/2016).     Medications:   Scheduled Meds:    amiodarone  200 mg Oral BID    [START ON 1/4/2020] amiodarone  200 mg Oral Daily    rivaroxaban  20 mg Oral Daily    metoprolol tartrate  50 mg Oral BID    aspirin  81 mg Oral Daily    atorvastatin  20 mg Oral Nightly    vancomycin  1,250 mg Intravenous Q12H    sodium chloride flush  10 mL Intravenous 2 times per day    lidocaine 1 % injection  5 mL Intradermal Once    sodium chloride flush  10 mL Intravenous 2 times per day    miconazole   Topical BID    pantoprazole  40 mg Intravenous Daily     Continuous Infusions:    IV infusion builder 100 mL/hr at 12/30/19 0049     PRN Meds: sodium chloride bacteriostatic, acetaminophen, perflutren lipid microspheres, sodium chloride flush, sodium chloride flush, sodium chloride flush, acetaminophen, ondansetron, magnesium hydroxide, ondansetron, morphine, potassium chloride **OR** potassium alternative oral replacement **OR** potassium chloride  Home Meds:   Prior to Admission medications    Medication Sig Start Date End Date Taking? Authorizing Provider   citalopram (CELEXA) 20 MG tablet Take 20 mg by mouth daily    Historical Provider, MD   oxyCODONE (OXYCONTIN) 10 MG extended release tablet Take 10 mg by mouth every 12 hours. Historical Provider, MD   oxyCODONE-acetaminophen (PERCOCET) 5-325 MG per tablet Take 1 tablet by mouth every 6 hours as needed for Pain. Historical Provider, MD   ondansetron (ZOFRAN) 4 MG tablet Take 4 mg by mouth every 6 hours as needed for Nausea or Vomiting    Historical Provider, MD   melatonin 3 MG TABS tablet Take 6 mg by mouth nightly as needed (insomnia)    Historical Provider, MD   miconazole (MICOTIN) 2 % powder Apply topically 2 times daily. 12/9/19   Trish Caicedo,    spironolactone (ALDACTONE) 25 MG tablet Take 0.5 tablets by mouth daily 12/10/19   Trish Caicedo DO   torsemide (DEMADEX) 20 MG tablet Take 1 tablet by mouth daily 12/10/19   Trish Caicedo, DO   aspirin EC 81 MG EC tablet Take 1 tablet by mouth 2 times daily Take for DVT blood clot prophylaxis. Please avoid missing doses.  12/6/19 1/5/20  KADY Nelson - CNP   omeprazole (PRILOSEC) 20 MG delayed release capsule Take 20 mg by mouth daily    Historical Provider, MD   potassium chloride (MICRO-K) 10 MEQ extended release capsule Take 10 mEq by mouth daily    Historical Provider, MD   vitamin B-12 (CYANOCOBALAMIN) 100 MCG tablet Take 100 mcg by mouth daily    Historical Provider, MD   folic acid (FOLVITE) 1 MG tablet Take 1 mg by mouth daily    Historical Provider, MD   atorvastatin (LIPITOR) 20 MG tablet TAKE 1 TABLET BY MOUTH DAILY 7/12/19   Venessa Justice MD   [facility]) OINT ointment APPLY TOPICALLY TWICE DAILY AS NEEDED  Patient taking differently: APPLY TOPICALLY TWICE DAILY AS NEEDED FOR SKIN DRYNESS 6/7/19   Travis Armstrong MD   rivaroxaban (XARELTO) 20 MG TABS tablet TAKE 1 TABLET BY MOUTH DAILY 5/22/19   [place] MD   metoprolol succinate (TOPROL XL) 25 MG extended release tablet TAKE 1 TABLET BY MOUTH DAILY 9/19/18   [place] MD   ferrous sulfate (BEST-GLORIA) 325 (65 Fe) MG tablet Take 1 tablet by mouth 2 times daily 2/1/18   Travis Armstrong MD   acetaminophen (TYLENOL) 325 MG tablet Take 650 mg by mouth every 6 hours as needed for Pain     Historical Provider, MD     Coumadin Use Last 7 Days:  no  Antiplatelet drug therapy use last 7 days: no  Other anticoagulant use last 7 days: no  Additional Medication Information:  na      Pre-Sedation Documentation and Exam:   Vital signs have been reviewed (see flow sheet for vitals). I have reviewed the patient's history and review of systems. Vitals:    12/30/19 1230   BP: 112/66   Pulse: 84   Resp: 16   Temp: 97.2 °F (36.2 °C)   SpO2: 98%       Lungs: clear to auscultation bilaterally without crackles or wheezing, Cardiovascular: regular rate and rhythm, no murmurs rubs or gallops.     Mallampati Airway Assessment:  Mallampati Class III - (soft palate & base of uvula are visible)    Prior History of Anesthesia Complications:   none    ASA Classification:  Class 3 - A patient with severe systemic disease that limits activity but is not incapacitating    Sedation/ Anesthesia Plan:   intravenous sedation    Medications Planned:   midazolam (Versed) intravenously and fentanyl intravenously    Patient is an appropriate candidate for plan of sedation: yes    Electronically signed by Milady Espitia MD on 12/30/2019 at 1:26 PM

## 2019-12-31 LAB
ANION GAP SERPL CALCULATED.3IONS-SCNC: 7 MMOL/L (ref 3–16)
ANION GAP SERPL CALCULATED.3IONS-SCNC: 8 MMOL/L (ref 3–16)
BASOPHILS ABSOLUTE: 0 K/UL (ref 0–0.2)
BASOPHILS RELATIVE PERCENT: 0.3 %
BLOOD CULTURE, ROUTINE: NORMAL
BUN BLDV-MCNC: 22 MG/DL (ref 7–20)
BUN BLDV-MCNC: 23 MG/DL (ref 7–20)
CALCIUM SERPL-MCNC: 8.2 MG/DL (ref 8.3–10.6)
CALCIUM SERPL-MCNC: 8.3 MG/DL (ref 8.3–10.6)
CHLORIDE BLD-SCNC: 104 MMOL/L (ref 99–110)
CHLORIDE BLD-SCNC: 105 MMOL/L (ref 99–110)
CO2: 24 MMOL/L (ref 21–32)
CO2: 24 MMOL/L (ref 21–32)
CREAT SERPL-MCNC: 0.8 MG/DL (ref 0.8–1.3)
CREAT SERPL-MCNC: 1 MG/DL (ref 0.8–1.3)
CULTURE, BLOOD 2: ABNORMAL
EOSINOPHILS ABSOLUTE: 0.2 K/UL (ref 0–0.6)
EOSINOPHILS RELATIVE PERCENT: 2.6 %
GFR AFRICAN AMERICAN: >60
GFR AFRICAN AMERICAN: >60
GFR NON-AFRICAN AMERICAN: >60
GFR NON-AFRICAN AMERICAN: >60
GLUCOSE BLD-MCNC: 121 MG/DL (ref 70–99)
GLUCOSE BLD-MCNC: 139 MG/DL (ref 70–99)
HCT VFR BLD CALC: 29.2 % (ref 40.5–52.5)
HEMOGLOBIN: 9.4 G/DL (ref 13.5–17.5)
LYMPHOCYTES ABSOLUTE: 0.7 K/UL (ref 1–5.1)
LYMPHOCYTES RELATIVE PERCENT: 10.9 %
MCH RBC QN AUTO: 31.2 PG (ref 26–34)
MCHC RBC AUTO-ENTMCNC: 32.2 G/DL (ref 31–36)
MCV RBC AUTO: 97.2 FL (ref 80–100)
MONOCYTES ABSOLUTE: 0.6 K/UL (ref 0–1.3)
MONOCYTES RELATIVE PERCENT: 8.9 %
NEUTROPHILS ABSOLUTE: 5.1 K/UL (ref 1.7–7.7)
NEUTROPHILS RELATIVE PERCENT: 77.3 %
ORGANISM: ABNORMAL
PDW BLD-RTO: 16.6 % (ref 12.4–15.4)
PLATELET # BLD: 130 K/UL (ref 135–450)
PMV BLD AUTO: 9.6 FL (ref 5–10.5)
POTASSIUM REFLEX MAGNESIUM: 3.9 MMOL/L (ref 3.5–5.1)
POTASSIUM SERPL-SCNC: 4.1 MMOL/L (ref 3.5–5.1)
RBC # BLD: 3 M/UL (ref 4.2–5.9)
SODIUM BLD-SCNC: 135 MMOL/L (ref 136–145)
SODIUM BLD-SCNC: 137 MMOL/L (ref 136–145)
WBC # BLD: 6.6 K/UL (ref 4–11)

## 2019-12-31 PROCEDURE — C9113 INJ PANTOPRAZOLE SODIUM, VIA: HCPCS | Performed by: NURSE PRACTITIONER

## 2019-12-31 PROCEDURE — 36592 COLLECT BLOOD FROM PICC: CPT

## 2019-12-31 PROCEDURE — 2500000003 HC RX 250 WO HCPCS: Performed by: INTERNAL MEDICINE

## 2019-12-31 PROCEDURE — 85025 COMPLETE CBC W/AUTO DIFF WBC: CPT

## 2019-12-31 PROCEDURE — 87040 BLOOD CULTURE FOR BACTERIA: CPT

## 2019-12-31 PROCEDURE — 6370000000 HC RX 637 (ALT 250 FOR IP): Performed by: INTERNAL MEDICINE

## 2019-12-31 PROCEDURE — 80048 BASIC METABOLIC PNL TOTAL CA: CPT

## 2019-12-31 PROCEDURE — 6370000000 HC RX 637 (ALT 250 FOR IP): Performed by: NURSE PRACTITIONER

## 2019-12-31 PROCEDURE — 6360000002 HC RX W HCPCS: Performed by: NURSE PRACTITIONER

## 2019-12-31 PROCEDURE — 99233 SBSQ HOSP IP/OBS HIGH 50: CPT | Performed by: INTERNAL MEDICINE

## 2019-12-31 PROCEDURE — 2580000003 HC RX 258: Performed by: INTERNAL MEDICINE

## 2019-12-31 PROCEDURE — 2580000003 HC RX 258: Performed by: NURSE PRACTITIONER

## 2019-12-31 PROCEDURE — 2060000000 HC ICU INTERMEDIATE R&B

## 2019-12-31 PROCEDURE — 6360000002 HC RX W HCPCS: Performed by: INTERNAL MEDICINE

## 2019-12-31 RX ADMIN — MICONAZOLE NITRATE: 20 POWDER TOPICAL at 21:11

## 2019-12-31 RX ADMIN — Medication 10 ML: at 23:20

## 2019-12-31 RX ADMIN — Medication 10 ML: at 04:50

## 2019-12-31 RX ADMIN — MICONAZOLE NITRATE: 20 POWDER TOPICAL at 09:21

## 2019-12-31 RX ADMIN — ACETAMINOPHEN 650 MG: 325 TABLET, FILM COATED ORAL at 04:59

## 2019-12-31 RX ADMIN — RIVAROXABAN 20 MG: 20 TABLET, FILM COATED ORAL at 17:25

## 2019-12-31 RX ADMIN — Medication 10 ML: at 09:17

## 2019-12-31 RX ADMIN — Medication 10 ML: at 04:48

## 2019-12-31 RX ADMIN — ACETAMINOPHEN 650 MG: 325 TABLET, FILM COATED ORAL at 12:46

## 2019-12-31 RX ADMIN — Medication 10 ML: at 04:47

## 2019-12-31 RX ADMIN — METOPROLOL TARTRATE 50 MG: 50 TABLET, FILM COATED ORAL at 09:17

## 2019-12-31 RX ADMIN — MORPHINE SULFATE 4 MG: 4 INJECTION, SOLUTION INTRAMUSCULAR; INTRAVENOUS at 00:31

## 2019-12-31 RX ADMIN — MORPHINE SULFATE 4 MG: 4 INJECTION, SOLUTION INTRAMUSCULAR; INTRAVENOUS at 04:56

## 2019-12-31 RX ADMIN — METOPROLOL TARTRATE 50 MG: 50 TABLET, FILM COATED ORAL at 21:08

## 2019-12-31 RX ADMIN — VANCOMYCIN HYDROCHLORIDE 1250 MG: 10 INJECTION, POWDER, LYOPHILIZED, FOR SOLUTION INTRAVENOUS at 12:46

## 2019-12-31 RX ADMIN — ASPIRIN 81 MG 81 MG: 81 TABLET ORAL at 09:17

## 2019-12-31 RX ADMIN — ATORVASTATIN CALCIUM 20 MG: 20 TABLET, FILM COATED ORAL at 21:08

## 2019-12-31 RX ADMIN — MORPHINE SULFATE 4 MG: 4 INJECTION, SOLUTION INTRAMUSCULAR; INTRAVENOUS at 17:24

## 2019-12-31 RX ADMIN — AMIODARONE HYDROCHLORIDE 200 MG: 200 TABLET ORAL at 21:08

## 2019-12-31 RX ADMIN — AMIODARONE HYDROCHLORIDE 200 MG: 200 TABLET ORAL at 09:17

## 2019-12-31 RX ADMIN — PANTOPRAZOLE SODIUM 40 MG: 40 INJECTION, POWDER, FOR SOLUTION INTRAVENOUS at 09:17

## 2019-12-31 RX ADMIN — ACETAMINOPHEN 650 MG: 325 TABLET, FILM COATED ORAL at 21:08

## 2019-12-31 RX ADMIN — MORPHINE SULFATE 4 MG: 4 INJECTION, SOLUTION INTRAMUSCULAR; INTRAVENOUS at 23:16

## 2019-12-31 RX ADMIN — Medication 10 ML: at 04:49

## 2019-12-31 RX ADMIN — MORPHINE SULFATE 4 MG: 4 INJECTION, SOLUTION INTRAMUSCULAR; INTRAVENOUS at 09:17

## 2019-12-31 RX ADMIN — Medication 10 ML: at 09:21

## 2019-12-31 RX ADMIN — VANCOMYCIN HYDROCHLORIDE 1250 MG: 10 INJECTION, POWDER, LYOPHILIZED, FOR SOLUTION INTRAVENOUS at 00:35

## 2019-12-31 ASSESSMENT — PAIN DESCRIPTION - ONSET
ONSET: ON-GOING
ONSET: ON-GOING

## 2019-12-31 ASSESSMENT — PAIN SCALES - GENERAL
PAINLEVEL_OUTOF10: 10
PAINLEVEL_OUTOF10: 5
PAINLEVEL_OUTOF10: 5
PAINLEVEL_OUTOF10: 7
PAINLEVEL_OUTOF10: 0
PAINLEVEL_OUTOF10: 0
PAINLEVEL_OUTOF10: 7
PAINLEVEL_OUTOF10: 10
PAINLEVEL_OUTOF10: 9
PAINLEVEL_OUTOF10: 10
PAINLEVEL_OUTOF10: 7
PAINLEVEL_OUTOF10: 7

## 2019-12-31 ASSESSMENT — PAIN DESCRIPTION - DESCRIPTORS
DESCRIPTORS: ACHING;CONSTANT
DESCRIPTORS: ACHING

## 2019-12-31 ASSESSMENT — PAIN DESCRIPTION - ORIENTATION
ORIENTATION: LOWER
ORIENTATION: LOWER

## 2019-12-31 ASSESSMENT — PAIN DESCRIPTION - LOCATION
LOCATION: BACK
LOCATION: BACK

## 2019-12-31 ASSESSMENT — PAIN DESCRIPTION - PROGRESSION: CLINICAL_PROGRESSION: NOT CHANGED

## 2019-12-31 ASSESSMENT — PAIN DESCRIPTION - PAIN TYPE
TYPE: ACUTE PAIN;CHRONIC PAIN
TYPE: CHRONIC PAIN

## 2019-12-31 ASSESSMENT — PAIN DESCRIPTION - FREQUENCY
FREQUENCY: CONTINUOUS
FREQUENCY: CONTINUOUS

## 2019-12-31 NOTE — PLAN OF CARE
Problem: Pain:  Goal: Control of chronic pain  Description  Control of chronic pain  Outcome: Ongoing     Problem: HEMODYNAMIC STATUS  Goal: Patient has stable vital signs and fluid balance  Outcome: Ongoing     Vitals:    12/31/19 0030   BP: 106/71   Pulse: 94   Resp: 20   Temp:    SpO2:      Pt requesting morphine for continued chronic back pain. /71. Gave morphine at 0031. See [facility] & all flowsheets. Will continue to monitor.

## 2019-12-31 NOTE — PLAN OF CARE
Problem: Pain:  Goal: Control of chronic pain  Description  Control of chronic pain  Outcome: Ongoing     Problem: Mood - Altered:  Goal: Verbalizations of feeling emotionally comfortable while being cared for will increase  Description  Verbalizations of feeling emotionally comfortable while being cared for will increase  Outcome: Ongoing     Problem: HEMODYNAMIC STATUS  Goal: Patient has stable vital signs and fluid balance  Outcome: Ongoing     Vitals:    12/31/19 0452   BP: 108/72   Pulse: 91   Resp: 20   Temp: 98 °F (36.7 °C)   SpO2: 97%     Labs drawn via PICC & line flushed per protocol. Bloodwork sent to lab. Pt c/o pain 7/10 continued and requested morphine and tylenol. VSS with /72. Gave morphine 4mg IV at 0456 and tylenol at 0459. See [facility] & all flowsheets. Will continue to monitor.

## 2019-12-31 NOTE — PROGRESS NOTES
(EFFER-K) effervescent tablet 40 mEq, PRN    Or  potassium chloride 10 mEq/100 mL IVPB (Peripheral Line), PRN        Objective:  BP 99/63   Pulse 75   Temp 98 °F (36.7 °C) (Oral)   Resp 17   Ht 6' 1\" (1.854 m)   Wt 243 lb 12.8 oz (110.6 kg)   SpO2 95%   BMI 32.17 kg/m²     Intake/Output Summary (Last 24 hours) at 12/31/2019 1451  Last data filed at 12/31/2019 0921  Gross per 24 hour   Intake 2350.5 ml   Output 1900 ml   Net 450.5 ml      Wt Readings from Last 3 Encounters:   12/31/19 243 lb 12.8 oz (110.6 kg)   12/10/19 258 lb 2.5 oz (117.1 kg)   10/29/19 261 lb 14.5 oz (118.8 kg)       General appearance:    lying in bed    HEENT: atraumatic, Pupils equal, muscous membranes moist, no masses appreciated; rt facial droop noted. Cardiovascular: irregular irregular,  no murmurs appreciated  Respiratory: CTAB no wheezing  Gastrointestinal: Abdomen soft, non-tender, BS+  EXT: no edema  Neurology: dimished strength RUE and dysarthic speech- from prior stroke; edema in scrotum and thighs. Skin: Warm, dry, no rashes appreciated    Labs and Tests:  CBC:   Recent Labs     12/29/19  0550 12/30/19  0545 12/31/19  0445   WBC 7.3 5.8 6.6   HGB 9.8* 9.7* 9.4*    120* 130*     BMP:    Recent Labs     12/30/19  0545 12/30/19  1632 12/31/19  0445    141 137   K 3.9 4.3 3.9    108 105   CO2 24 24 24   BUN 24* 23* 23*   CREATININE 0.9 0.9 0.8   GLUCOSE 156* 122* 121*     Hepatic:   No results for input(s): AST, ALT, ALB, BILITOT, ALKPHOS in the last 72 hours. IR REMOVE TUNNELED CVAD W SQ PORT/PUMP INSERT   Final Result   Successful subcutaneous Port-A-Cath removal.         IR INSERT PICC VAD W SQ PORT >5 YEARS   Final Result   Successful ultrasound and fluoroscopy guided PICC placement         CT ABDOMEN PELVIS WO CONTRAST   Final Result   Acute right femoral neck fracture. Small right pleural effusion. Mild bibasilar atelectasis.       No acute inflammatory abnormality is identified in the abdomen PM

## 2019-12-31 NOTE — PROGRESS NOTES
history: Patient was seen and examined at bedside. Interval history was obtained. He is encephalopathic but slightly more awake today. Tolerating IV vancomycin okay. Afebrile. REVIEW OF SYSTEMS:    Review of Systems   Unable to perform ROS: Mental status change       Past Medical History: All past medical history reviewed today. Past Medical History:   Diagnosis Date    Atrial fibrillation ([address]     B-cell lymphoma (HCC)     Back pain     Cancer (HCC)     Cerebral infarction ([address]     CHF (congestive heart failure) (HCC)     Chronic hepatitis C without hepatic coma (HCC)     Chronic obstructive pulmonary disease (HCC)     Hypertension     Hypokalemia     Infestation by bed bug     2018    MRSA colonization 12/04/2019    Obesity        Past Surgical History: All past surgical history was reviewed today. Past Surgical History:   Procedure Laterality Date    FINE NEEDLE ASPIRATION      JOINT REPLACEMENT Right 2010    right TKA Knee via Right    KNEE SURGERY      TUNNELED VENOUS PORT PLACEMENT Left 09/02/2016    DR. Topher Leon POWER PORT       Family History: All family history was reviewed today.         Problem Relation Age of Onset    Alzheimer's Disease Mother        Objective:       PHYSICAL EXAM:      Vitals:   Vitals:    12/31/19 0452 12/31/19 0530 12/31/19 0916 12/31/19 1251   BP: 108/72  111/67 99/63   Pulse: 91  99 75   Resp: 20  18 17   Temp: 98 °F (36.7 °C)  97.4 °F (36.3 °C) 98 °F (36.7 °C)   TempSrc: Oral  Oral Oral   SpO2: 97%   95%   Weight:  243 lb 12.8 oz (110.6 kg)     Height:           Physical Exam       General: Encephalopathic but protecting the airway so far  HEENT: normocephalic, atraumatic, sclera clear, pupils equal, light reflex preserved bilaterally  Cardiovascular: RRR, no murmurs/rubs/gallops detected  Pulmonary: CTABL, no rhonchi/rales   Abdomen/GI: soft, no organomegaly, bowel sounds positive  Neuro: Encephalopathic, pupils are equal and reactive to light, moves all extremities  Skin: no rash,   Musculoskeletal:  No obvious joint swelling, redness. No limitation of range of passive motion  Genitourinary: Chou's catheter in place   Psych: could not assess  Lymphatic/Immunologic: No obvious bruising, no cervical lymphadenopathy    Lines: All vascular access sites are healthy with no local erythema, discharge or tenderness    Intake and output:    I/O last 3 completed shifts: In: 2552.2 [P.O.:720; I.V.:1332.2; IV Piggyback:500]  Out: 1900 [Urine:1900]    Lab Data:   All available labs and old records have been reviewed by me. CBC:  Recent Labs     12/29/19  0550 12/30/19  0545 12/31/19  0445   WBC 7.3 5.8 6.6   RBC 3.08* 3.05* 3.00*   HGB 9.8* 9.7* 9.4*   HCT 31.0* 30.4* 29.2*    120* 130*   .5* 99.7 97.2   MCH 31.7 31.9 31.2   MCHC 31.5 32.0 32.2   RDW 17.5* 17.2* 16.6*        BMP:  Recent Labs     12/30/19  0545 12/30/19  1632 12/31/19  0445    141 137   K 3.9 4.3 3.9    108 105   CO2 24 24 24   BUN 24* 23* 23*   CREATININE 0.9 0.9 0.8   CALCIUM 8.1* 8.4 8.3   GLUCOSE 156* 122* 121*        Hepatic Function Panel:   Lab Results   Component Value Date    ALKPHOS 136 12/26/2019    ALT 19 12/26/2019    AST 34 12/26/2019    PROT 7.2 12/26/2019    PROT 7.0 06/01/2017    BILITOT 1.3 12/26/2019    BILIDIR 0.3 05/09/2019    IBILI 0.5 05/09/2019    LABALBU 2.7 12/26/2019       CPK:   Lab Results   Component Value Date    CKTOTAL 33 (L) 12/25/2019     ESR: No results found for: SEDRATE  CRP: No results found for: CRP        Imaging: All pertinent images and reports for the current visit were reviewed by me during this visit.     IR REMOVE TUNNELED CVAD W SQ PORT/PUMP INSERT   Final Result   Successful subcutaneous Port-A-Cath removal.         IR INSERT PICC VAD W SQ PORT >5 YEARS   Final Result   Successful ultrasound and fluoroscopy guided PICC placement         CT ABDOMEN PELVIS WO CONTRAST   Final Result   Acute right femoral neck fracture. Small right pleural effusion. Mild bibasilar atelectasis. No acute inflammatory abnormality is identified in the abdomen or pelvis. Cholelithiasis. MRI BRAIN W WO CONTRAST   Final Result   1. Motion degrades images limiting evaluation. 2. No convincing acute intracranial abnormality. No acute infarct. 3. Mild global parenchymal volume loss with chronic microvascular ischemic   changes. 4. Scattered mucosal thickening of the paranasal sinuses. 5. Left mastoid effusion. VL Extremity Venous Right   Final Result      CT Head WO Contrast   Final Result   No acute intracranial abnormality. XR CHEST PORTABLE   Final Result   Cardiomegaly with mild interstitial edema, small pleural effusions, and   bibasilar atelectasis             Medications: All current and past medications were reviewed.      amiodarone  200 mg Oral BID    [START ON 1/4/2020] amiodarone  200 mg Oral Daily    rivaroxaban  20 mg Oral Daily    metoprolol tartrate  50 mg Oral BID    aspirin  81 mg Oral Daily    atorvastatin  20 mg Oral Nightly    vancomycin  1,250 mg Intravenous Q12H    sodium chloride flush  10 mL Intravenous 2 times per day    lidocaine 1 % injection  5 mL Intradermal Once    sodium chloride flush  10 mL Intravenous 2 times per day    miconazole   Topical BID    pantoprazole  40 mg Intravenous Daily           lip balm petroleum free, sodium chloride bacteriostatic, acetaminophen, perflutren lipid microspheres, sodium chloride flush, sodium chloride flush, sodium chloride flush, acetaminophen, ondansetron, magnesium hydroxide, ondansetron, morphine, potassium chloride **OR** potassium alternative oral replacement **OR** potassium chloride      Problem list:       Patient Active Problem List   Diagnosis Code    Anasarca R60.1    Atrial fibrillation with RVR ([facility]) I48.91    Cerebral infarction (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 Deidre Paulo, Y92.009    Tinea cruris B35.6    Acute on chronic respiratory failure with hypercapnia (HCC) J96.22    Pleural effusion, bilateral J90    Pulmonary hypertension (HCC) I27.20    AMS (altered mental status) R41.82    Sepsis due to methicillin resistant Staphylococcus aureus (MRSA) ([facility]) A41.02    MRSA bacteremia R78.81    Port-A-Cath in place Z95.828    Chronic hepatitis C without hepatic coma (HCC) B18.2    History of stroke Z86.73    Right hemiplegia (HCC) G81.91    Chronic obstructive pulmonary disease ([facility] J44.9       Please note that this chart was generated using Dragon dictation software. Although every effort was made to ensure the accuracy of this automated transcription, some errors in transcription may have occurred inadvertently. If you may need any clarification, please do not hesitate to contact me through EPIC or at the phone number provided below with my electronic signature.   Any pictures or media included in this note were obtained after taking informed verbal consent from the patient and with their approval to include those in the patient's medical record.     Gen Castrejon MD, MPH  12/31/2019 , 12:55 PM   [facility]   Office: [phone]  Fax: [phone]  Tuesday AM clinic:   [address] [place] [address] 120  Thursday AM [id number] [facility]

## 2019-12-31 NOTE — PLAN OF CARE
Problem: Pain:  Goal: Pain level will decrease  Description  Pain level will decrease  Outcome: Ongoing  Note:   Patient resting with RR > 10 most of this shift. Meds given as ordered and as needed. Morphine given before patient getting a bath in anticipation of increase in pain. Will continue to monitor comfort. Problem: Falls - Risk of:  Goal: Will remain free from falls  Description  Will remain free from falls  Outcome: Ongoing  Note:   Pt remains free from falls. Safety precautions in place. Bed in lowest position, bed wheels locked, call light with in reach, bed alarm on, yellow blanket in place, fall risk wrist band on, SAFE outside of doorway. Will continue to monitor. Problem: Confusion - Acute:  Goal: Mental status will be restored to baseline  Description  Mental status will be restored to baseline  Outcome: Ongoing  Note:   Patient is alert and oriented to person. Patient continues to be disoriented to place time and situation. Per patient family he is pretty much back to baseline. Patient continues to have slurred speech, dysarthria and delayed responses at times. Problem: Injury - Risk of, Physical Injury:  Goal: Will remain free from falls  Description  Will remain free from falls  Outcome: Ongoing  Note:   Pt remains free from falls. Safety precautions in place. Bed in lowest position, bed wheels locked, call light with in reach, bed alarm on, yellow blanket in place, fall risk wrist band on, SAFE outside of doorway. Will continue to monitor. Problem: Mood - Altered:  Goal: Mood stable  Description  Mood stable  Outcome: Ongoing  Note:   Patient is calm and cooperative. Patient given a bath this AM.  Continues to have large amount of pain with turning to right hip. Morphine given before bath in anticipation of pain and patient tolerated fairly well. Will continue to monitor mood.       Problem: FLUID AND ELECTROLYTE IMBALANCE  Goal: Fluid and electrolyte balance are

## 2019-12-31 NOTE — PLAN OF CARE
Problem: HEMODYNAMIC STATUS  Goal: Patient has stable vital signs and fluid balance  Outcome: Ongoing     Problem: Pain:  Goal: Control of chronic pain  Description  Control of chronic pain  Outcome: Ongoing     Vitals:    12/31/19 0011   BP: 95/63   Pulse: 89   Resp: 15   Temp: 97.7 °F (36.5 °C)   SpO2: 97%     VSS with BP 95/63. Pt c/o pain and requests morphine when next allowed.  Will recheck BP before giving morphine tonight

## 2020-01-01 LAB
ANION GAP SERPL CALCULATED.3IONS-SCNC: 8 MMOL/L (ref 3–16)
ANION GAP SERPL CALCULATED.3IONS-SCNC: 9 MMOL/L (ref 3–16)
BASOPHILS ABSOLUTE: 0 K/UL (ref 0–0.2)
BASOPHILS RELATIVE PERCENT: 0.2 %
BLOOD CULTURE, ROUTINE: NORMAL
BUN BLDV-MCNC: 20 MG/DL (ref 7–20)
BUN BLDV-MCNC: 20 MG/DL (ref 7–20)
CALCIUM SERPL-MCNC: 8 MG/DL (ref 8.3–10.6)
CALCIUM SERPL-MCNC: 8.2 MG/DL (ref 8.3–10.6)
CHLORIDE BLD-SCNC: 103 MMOL/L (ref 99–110)
CHLORIDE BLD-SCNC: 106 MMOL/L (ref 99–110)
CO2: 24 MMOL/L (ref 21–32)
CO2: 26 MMOL/L (ref 21–32)
CREAT SERPL-MCNC: 0.8 MG/DL (ref 0.8–1.3)
CREAT SERPL-MCNC: 0.9 MG/DL (ref 0.8–1.3)
EOSINOPHILS ABSOLUTE: 0.1 K/UL (ref 0–0.6)
EOSINOPHILS RELATIVE PERCENT: 2.4 %
GFR AFRICAN AMERICAN: >60
GFR AFRICAN AMERICAN: >60
GFR NON-AFRICAN AMERICAN: >60
GFR NON-AFRICAN AMERICAN: >60
GLUCOSE BLD-MCNC: 111 MG/DL (ref 70–99)
GLUCOSE BLD-MCNC: 165 MG/DL (ref 70–99)
HCT VFR BLD CALC: 28.3 % (ref 40.5–52.5)
HEMOGLOBIN: 9 G/DL (ref 13.5–17.5)
LYMPHOCYTES ABSOLUTE: 0.7 K/UL (ref 1–5.1)
LYMPHOCYTES RELATIVE PERCENT: 12.8 %
MCH RBC QN AUTO: 31.3 PG (ref 26–34)
MCHC RBC AUTO-ENTMCNC: 31.7 G/DL (ref 31–36)
MCV RBC AUTO: 98.7 FL (ref 80–100)
MONOCYTES ABSOLUTE: 0.5 K/UL (ref 0–1.3)
MONOCYTES RELATIVE PERCENT: 9.2 %
NEUTROPHILS ABSOLUTE: 4 K/UL (ref 1.7–7.7)
NEUTROPHILS RELATIVE PERCENT: 75.4 %
PDW BLD-RTO: 16.6 % (ref 12.4–15.4)
PLATELET # BLD: 127 K/UL (ref 135–450)
PMV BLD AUTO: 9.9 FL (ref 5–10.5)
POTASSIUM REFLEX MAGNESIUM: 4.1 MMOL/L (ref 3.5–5.1)
POTASSIUM SERPL-SCNC: 3.3 MMOL/L (ref 3.5–5.1)
PROCALCITONIN: 0.35 NG/ML (ref 0–0.15)
RBC # BLD: 2.87 M/UL (ref 4.2–5.9)
SODIUM BLD-SCNC: 138 MMOL/L (ref 136–145)
SODIUM BLD-SCNC: 138 MMOL/L (ref 136–145)
WBC # BLD: 5.3 K/UL (ref 4–11)

## 2020-01-01 PROCEDURE — 6370000000 HC RX 637 (ALT 250 FOR IP): Performed by: INTERNAL MEDICINE

## 2020-01-01 PROCEDURE — 85025 COMPLETE CBC W/AUTO DIFF WBC: CPT

## 2020-01-01 PROCEDURE — 2060000000 HC ICU INTERMEDIATE R&B

## 2020-01-01 PROCEDURE — 2580000003 HC RX 258: Performed by: NURSE PRACTITIONER

## 2020-01-01 PROCEDURE — 84145 PROCALCITONIN (PCT): CPT

## 2020-01-01 PROCEDURE — 2580000003 HC RX 258: Performed by: INTERNAL MEDICINE

## 2020-01-01 PROCEDURE — 6370000000 HC RX 637 (ALT 250 FOR IP): Performed by: NURSE PRACTITIONER

## 2020-01-01 PROCEDURE — C9113 INJ PANTOPRAZOLE SODIUM, VIA: HCPCS | Performed by: NURSE PRACTITIONER

## 2020-01-01 PROCEDURE — 80048 BASIC METABOLIC PNL TOTAL CA: CPT

## 2020-01-01 PROCEDURE — 6360000002 HC RX W HCPCS: Performed by: INTERNAL MEDICINE

## 2020-01-01 PROCEDURE — 6360000002 HC RX W HCPCS: Performed by: NURSE PRACTITIONER

## 2020-01-01 PROCEDURE — 99233 SBSQ HOSP IP/OBS HIGH 50: CPT | Performed by: INTERNAL MEDICINE

## 2020-01-01 RX ORDER — OXYCODONE HCL 10 MG/1
10 TABLET, FILM COATED, EXTENDED RELEASE ORAL EVERY 12 HOURS SCHEDULED
Status: DISCONTINUED | OUTPATIENT
Start: 2020-01-01 | End: 2020-01-03 | Stop reason: HOSPADM

## 2020-01-01 RX ORDER — OXYCODONE HYDROCHLORIDE AND ACETAMINOPHEN 5; 325 MG/1; MG/1
1 TABLET ORAL EVERY 6 HOURS PRN
Status: DISCONTINUED | OUTPATIENT
Start: 2020-01-01 | End: 2020-01-03 | Stop reason: HOSPADM

## 2020-01-01 RX ORDER — TORSEMIDE 20 MG/1
20 TABLET ORAL DAILY
Status: DISCONTINUED | OUTPATIENT
Start: 2020-01-01 | End: 2020-01-03 | Stop reason: HOSPADM

## 2020-01-01 RX ADMIN — RIVAROXABAN 20 MG: 20 TABLET, FILM COATED ORAL at 16:15

## 2020-01-01 RX ADMIN — Medication 10 ML: at 09:49

## 2020-01-01 RX ADMIN — TORSEMIDE 20 MG: 20 TABLET ORAL at 09:49

## 2020-01-01 RX ADMIN — VANCOMYCIN HYDROCHLORIDE 1250 MG: 10 INJECTION, POWDER, LYOPHILIZED, FOR SOLUTION INTRAVENOUS at 15:03

## 2020-01-01 RX ADMIN — METOPROLOL TARTRATE 50 MG: 50 TABLET, FILM COATED ORAL at 09:49

## 2020-01-01 RX ADMIN — Medication 10 ML: at 02:10

## 2020-01-01 RX ADMIN — ALTEPLASE 1 MG: 2.2 INJECTION, POWDER, LYOPHILIZED, FOR SOLUTION INTRAVENOUS at 13:15

## 2020-01-01 RX ADMIN — ASPIRIN 81 MG 81 MG: 81 TABLET ORAL at 10:15

## 2020-01-01 RX ADMIN — MICONAZOLE NITRATE: 20 POWDER TOPICAL at 09:49

## 2020-01-01 RX ADMIN — PANTOPRAZOLE SODIUM 40 MG: 40 INJECTION, POWDER, FOR SOLUTION INTRAVENOUS at 09:49

## 2020-01-01 RX ADMIN — OXYCODONE HYDROCHLORIDE 10 MG: 10 TABLET, FILM COATED, EXTENDED RELEASE ORAL at 21:10

## 2020-01-01 RX ADMIN — OXYCODONE HYDROCHLORIDE 10 MG: 10 TABLET, FILM COATED, EXTENDED RELEASE ORAL at 09:49

## 2020-01-01 RX ADMIN — METOPROLOL TARTRATE 50 MG: 50 TABLET, FILM COATED ORAL at 21:10

## 2020-01-01 RX ADMIN — OXYCODONE HYDROCHLORIDE AND ACETAMINOPHEN 1 TABLET: 5; 325 TABLET ORAL at 16:15

## 2020-01-01 RX ADMIN — ACETAMINOPHEN 650 MG: 325 TABLET, FILM COATED ORAL at 02:08

## 2020-01-01 RX ADMIN — ATORVASTATIN CALCIUM 20 MG: 20 TABLET, FILM COATED ORAL at 21:09

## 2020-01-01 RX ADMIN — AMIODARONE HYDROCHLORIDE 200 MG: 200 TABLET ORAL at 21:10

## 2020-01-01 RX ADMIN — ACETAMINOPHEN 650 MG: 325 TABLET, FILM COATED ORAL at 17:48

## 2020-01-01 RX ADMIN — VANCOMYCIN HYDROCHLORIDE 1250 MG: 10 INJECTION, POWDER, LYOPHILIZED, FOR SOLUTION INTRAVENOUS at 02:08

## 2020-01-01 RX ADMIN — AMIODARONE HYDROCHLORIDE 200 MG: 200 TABLET ORAL at 09:49

## 2020-01-01 ASSESSMENT — PAIN SCALES - GENERAL
PAINLEVEL_OUTOF10: 9
PAINLEVEL_OUTOF10: 6
PAINLEVEL_OUTOF10: 8
PAINLEVEL_OUTOF10: 6
PAINLEVEL_OUTOF10: 4
PAINLEVEL_OUTOF10: 5
PAINLEVEL_OUTOF10: 0

## 2020-01-01 ASSESSMENT — PAIN DESCRIPTION - ORIENTATION
ORIENTATION: RIGHT;LEFT
ORIENTATION: RIGHT;LEFT

## 2020-01-01 ASSESSMENT — PAIN DESCRIPTION - LOCATION
LOCATION: LEG
LOCATION: LEG

## 2020-01-01 NOTE — PROGRESS NOTES
use.  · Currently lives in: [address] [place] [address]  ·  reports no history of drug use. Assessment:     The patient is a 69 y.o. old male who  has a past medical history of Atrial fibrillation ([address] B-cell lymphoma ([address] Back pain, Cancer ([address] Cerebral infarction ([address] CHF (congestive heart failure) ([address] Chronic hepatitis C without hepatic coma ([address] Chronic obstructive pulmonary disease ([facility] Hypertension, Hypokalemia, Infestation by bed bug, MRSA colonization (12/04/2019), and Obesity. with following problems:    · Sepsis with high fever-resolved  · Complicated MRSA bacteremia-remains on IV vancomycin  · Severe tinea cruris-has been treated with fluconazole  · Need for contact isolation  · Diffuse large B-cell lymphoma  · Port-A-Cath in place in the chest  · Chronic hepatitis C  · History of stroke   · Chronic right hemiplegia  · Chronic right hip fracture  · History of right total knee arthroplasty in the past  · COPD  · Chronic atrial fibrillation  · Obesity Class 1 due to excess calorie intake : Body mass index is 31.62 kg/m². Discussion:      The patient is on IV vancomycin. He is tolerating the antibiotic okay. Serum creatinine 0.8. Blood cultures from  12/30/2019 and 12/31/2019 are running negative so far    Plan:     Diagnostic Workup:    · Continue to follow  fever curve, WBC count and blood cultures  · Follow up on liver and renal function    Antimicrobials:    · Will continue IV vancomycin  · Given high-grade bacteremia, will plan for 6 weeks of IV vancomycin based therapy from negative blood cultures  · The existing PICC line was placed when patient was still bacteremic.   If the blood cultures from 12/29/2019 remain negative till tomorrow, please remove the existing PICC line from the right arm and place a new PICC line in the left arm  · If the blood cultures from 12/29/2019 remain negative, the antibiotic plan will be as follows  · Discussed all above with patient and Results   Component Value Date    ALKPHOS 136 12/26/2019    ALT 19 12/26/2019    AST 34 12/26/2019    PROT 7.2 12/26/2019    PROT 7.0 06/01/2017    BILITOT 1.3 12/26/2019    BILIDIR 0.3 05/09/2019    IBILI 0.5 05/09/2019    LABALBU 2.7 12/26/2019       CPK:   Lab Results   Component Value Date    CKTOTAL 33 (L) 12/25/2019     ESR: No results found for: SEDRATE  CRP: No results found for: CRP        Imaging: All pertinent images and reports for the current visit were reviewed by me during this visit. IR REMOVE TUNNELED CVAD W SQ PORT/PUMP INSERT   Final Result   Successful subcutaneous Port-A-Cath removal.         IR INSERT PICC VAD W SQ PORT >5 YEARS   Final Result   Successful ultrasound and fluoroscopy guided PICC placement         CT ABDOMEN PELVIS WO CONTRAST   Final Result   Acute right femoral neck fracture. Small right pleural effusion. Mild bibasilar atelectasis. No acute inflammatory abnormality is identified in the abdomen or pelvis. Cholelithiasis. MRI BRAIN W WO CONTRAST   Final Result   1. Motion degrades images limiting evaluation. 2. No convincing acute intracranial abnormality. No acute infarct. 3. Mild global parenchymal volume loss with chronic microvascular ischemic   changes. 4. Scattered mucosal thickening of the paranasal sinuses. 5. Left mastoid effusion. VL Extremity Venous Right   Final Result      CT Head WO Contrast   Final Result   No acute intracranial abnormality. XR CHEST PORTABLE   Final Result   Cardiomegaly with mild interstitial edema, small pleural effusions, and   bibasilar atelectasis             Medications: All current and past medications were reviewed.      torsemide  20 mg Oral Daily    oxyCODONE  10 mg Oral 2 times per day    alteplase  1 mg Intracatheter Once    amiodarone  200 mg Oral BID    [START ON 1/4/2020] amiodarone  200 mg Oral Daily    rivaroxaban  20 mg Oral Daily    metoprolol tartrate  50 mg Oral BID

## 2020-01-01 NOTE — PROGRESS NOTES
Patient more alert and oriented this shift. Patient with complaint of pain to right hip. Oxycodone ER started this AM.  Percocet given once this evening. PICC line would not flush and gave no blood return. Cath josephine used to [employer]. Patient continues with swelling to Bilateral hip/thigh, lower abdomen and scrotal area. Scrotum is 2+ non pitting today. Will continue to monitor comfort.

## 2020-01-01 NOTE — PROGRESS NOTES
[facility] HOSPITALISTS PROGRESS NOTE    1/1/2020 11:13 AM        Name: Natalya Hughes . Admitted: 12/25/2019  Primary Care Provider: Wilton Hameed MD (Tel: [phone])     Cc: altered mentation    H/o: Sent  From SNF with altered mentation. Found to have port infection and MRSA infections. Port removed. picc placed the same day. Subjective:      Pt is doing well. Eating reasonably. Leg edema noted.      Reviewed interval ancillary notes    Current Medications  torsemide (DEMADEX) tablet 20 mg, Daily  oxyCODONE (OXYCONTIN) extended release tablet 10 mg, 2 times per day  oxyCODONE-acetaminophen (PERCOCET) 5-325 MG per tablet 1 tablet, Q6H PRN  alteplase (CATHFLO) injection 1 mg, Once  lip balm petroleum free (PHYTOPLEX) stick, PRN  amiodarone (CORDARONE) tablet 200 mg, BID  [START ON 1/4/2020] amiodarone (CORDARONE) tablet 200 mg, Daily  rivaroxaban (XARELTO) tablet 20 mg, Daily  sodium chloride bacteriostatic 0.9 % injection 15 mL, PRN  metoprolol tartrate (LOPRESSOR) tablet 50 mg, BID  acetaminophen (TYLENOL) suppository 650 mg, Q6H PRN  aspirin chewable tablet 81 mg, Daily  atorvastatin (LIPITOR) tablet 20 mg, Nightly  vancomycin (VANCOCIN) 1,250 mg in dextrose 5 % 250 mL IVPB, Q12H  perflutren lipid microspheres (DEFINITY) injection 1.65 mg, ONCE PRN  sodium chloride flush 0.9 % injection 10 mL, 2 times per day  sodium chloride flush 0.9 % injection 10 mL, PRN  sodium chloride flush 0.9 % injection 20 mL, ONCE PRN  lidocaine PF 1 % injection 5 mL, Once  sodium chloride flush 0.9 % injection 10 mL, 2 times per day  sodium chloride flush 0.9 % injection 10 mL, PRN  miconazole (MICOTIN) 2 % powder, BID  acetaminophen (TYLENOL) tablet 650 mg, Q6H PRN  ondansetron (ZOFRAN-ODT) disintegrating tablet 4 mg, Q6H PRN  magnesium hydroxide (MILK OF MAGNESIA) 400 MG/5ML suspension 30 mL, Daily PRN  ondansetron (ZOFRAN) injection CONTRAST   Final Result   Acute right femoral neck fracture. Small right pleural effusion. Mild bibasilar atelectasis. No acute inflammatory abnormality is identified in the abdomen or pelvis. Cholelithiasis. MRI BRAIN W WO CONTRAST   Final Result   1. Motion degrades images limiting evaluation. 2. No convincing acute intracranial abnormality. No acute infarct. 3. Mild global parenchymal volume loss with chronic microvascular ischemic   changes. 4. Scattered mucosal thickening of the paranasal sinuses. 5. Left mastoid effusion. VL Extremity Venous Right   Final Result      CT Head WO Contrast   Final Result   No acute intracranial abnormality. XR CHEST PORTABLE   Final Result   Cardiomegaly with mild interstitial edema, small pleural effusions, and   bibasilar atelectasis             Recent imaging reviewed         Assessment & Plan:      Acute metabolic encephalopathy: Mri brain neg . Sec to bacteremia. Resolved. Mentation  back to baseline      Sepsis secondary to MRSA bacteremia secondary to port infection:  Port removed 12/27/19, picc placed at the same time?- repeat blood cx on 12.28- 1/2 showing MRSA. Tip cx- MRSA. -  Echo showed no vegetations but showed severe TR compared to mild on echo few months ago. RHYS r/o vegetations  - continue vanc- BARBARA is 1. -  Await ID recs regarding removing PICc and placing a new one- possibly tomorrow    Hypernatremia: resolved. Cont to monitor     PAF with RVR:  Rate now controlled. Cont lopressor and amio per EP. Cont  xarelto    Dysphagia:  Speech on board. Dysphagia I pureed with mildly thickened liquids (nectar thick)    Hx of cva: residual dysarthria    Hx of diffuse b cell lymphoma, reviewed on onc notes aug 2019: no signs of reccurence    Edema: .restart torsemide today. Cont  Ensure bid for hypoproteinemia      Chronic pain in leg: stop iv pain meds.  Start back on oxycontin bid and oxycodone prn      Diet: DIET

## 2020-01-01 NOTE — DISCHARGE INSTR - COC
Continuity of Care Form    Patient Name: Renata Genao   :  1945  MRN:  [id number]    Admit date:  2019  Discharge date:  2020    Code Status Order: DNR-CCA   Advance Directives:   [facility] Documentation     Date/Time Healthcare Directive Type of Healthcare Directive Copy in [address] Agent's Name Healthcare Agent's Phone Number    [phone]  Yes, patient has an advance directive for healthcare treatment  Durable power of  for health care  Yes, copy in chart -- --  --          Admitting Physician:  Zaid Shore MD  PCP: Elmer Andersen MD    Discharging Nurse: Abe Eid RN  [address] Unit/Room#: 0NZ-0739/6111-72  Discharging Unit Phone Number: [phone]    Emergency Contact:   Extended Emergency Contact Information  Primary Emergency Contact: [address] Phone: [phone]  Relation: Child  Secondary Emergency Contact: [address] Phone: [phone]  Relation: Child    Past Surgical History:  Past Surgical History:   Procedure Laterality Date    FINE NEEDLE ASPIRATION      JOINT REPLACEMENT Right     right TKA Knee via Right    KNEE SURGERY      TUNNELED VENOUS PORT PLACEMENT Left 2016    DR. [facility] POWER PORT       Immunization History:   Immunization History   Administered Date(s) Administered    Pneumococcal Conjugate 13-valent (Peiahfe20) 2016    Pneumococcal Polysaccharide ([id number]) 2018    Tdap (Boostrix, Adacel) 2019       Active Problems:  Patient Active Problem List   Diagnosis Code    Anasarca R60.1    Atrial fibrillation with RVR ([address] I48.91    Cerebral infarction ([facility] [address] I63.9    Hypokalemia E87.6    Class 1 obesity due to excess calories with body mass index (BMI) of 31.0 to 31.9 in adult E66.09, Z68.31    CHF (congestive heart Hospital Discharge:   Respiratory Treatments: None  Oxygen Therapy:  is not on home oxygen therapy. Ventilator:    - No ventilator support    Rehab Therapies: Physical Therapy, Occupational Therapy and Speech/Language Therapy  Weight Bearing Status/Restrictions: No weight bearing restirctions  Other Medical Equipment (for information only, NOT a DME order):  wheelchair and hospital bed  Other Treatments: none    Patient's personal belongings (please select all that are sent with patient):  None    RN SIGNATURE:  Electronically signed by John Paul Raya RN on 1/2/20 at 8:10 PM    CASE MANAGEMENT/SOCIAL WORK SECTION    Inpatient Status Date: 12/25/19    Readmission Risk Assessment Score:  Readmission Risk              Risk of Unplanned Readmission:        35           Discharging to [facility]  Phone: [phone]  Fax: [phone]    / signature: Areli Donovan [place], [place]  [phone]      PHYSICIAN SECTION    Prognosis: Good    Condition at Discharge: Stable    Rehab Potential (if transferring to Rehab): Good    Recommended Labs or Other Treatments After Discharge:                             Out-patient antibiotic therapy (OPAT)/ Antibiotic Infusion orders          Diagnosis: Complicated MRSA bacteremia       Organism/ culture: MRSA     Name and dose of Antimicrobial: IV vancomycin 1.25 g every 12 hours     Antimicrobial start date: calculated from 12/29/2019     Antimicrobial completion date planned: February 10, 2020     Lab monitoring: CBC, Chem 12, ESR, CRP, vancomycin trough once a week, to be       collected every Monday or Tuesday morning until the patient is off IV          antibiotics. Fax weekly lab results to Antwon Villalba MD's office at        [phone]. Please maintain PICC line until the patient is on IV antibiotics.        ** Please notify Antwon Villalba MD's office with any change in patient's        status, transfer out of facility or to hospital by calling [phone]           Outpatient Follow up: Follow-up with Justa Corado MD in [facility] or [facility] ID clinic in 4-6 weeks. Physician Signature:  Electronically signed by Justa Corado MD on                                                               1/1/20 at 11:35 AM         Physician Certification: I certify the above information and transfer of Seward Goodpasture  is necessary for the continuing treatment of the diagnosis listed and that he requires skilled nursing care for less 30 days.      Update Admission H&P: No change in H&P    PHYSICIAN SIGNATURE:  Electronically signed by Rona Zuluaga MD on 1/2/20 at 2:32 PM

## 2020-01-02 ENCOUNTER — APPOINTMENT (OUTPATIENT)
Dept: GENERAL RADIOLOGY | Age: 75
DRG: 314 | End: 2020-01-02
Payer: MEDICARE

## 2020-01-02 VITALS
RESPIRATION RATE: 18 BRPM | BODY MASS INDEX: 32.1 KG/M2 | WEIGHT: 242.2 LBS | HEIGHT: 73 IN | TEMPERATURE: 97.7 F | OXYGEN SATURATION: 97 % | DIASTOLIC BLOOD PRESSURE: 72 MMHG | SYSTOLIC BLOOD PRESSURE: 112 MMHG | HEART RATE: 82 BPM

## 2020-01-02 LAB
ANION GAP SERPL CALCULATED.3IONS-SCNC: 9 MMOL/L (ref 3–16)
BASOPHILS ABSOLUTE: 0.1 K/UL (ref 0–0.2)
BASOPHILS RELATIVE PERCENT: 1.3 %
BLOOD CULTURE, ROUTINE: NORMAL
BUN BLDV-MCNC: 19 MG/DL (ref 7–20)
CALCIUM SERPL-MCNC: 8 MG/DL (ref 8.3–10.6)
CHLORIDE BLD-SCNC: 100 MMOL/L (ref 99–110)
CO2: 28 MMOL/L (ref 21–32)
CREAT SERPL-MCNC: 0.9 MG/DL (ref 0.8–1.3)
CULTURE, BLOOD 2: NORMAL
EOSINOPHILS ABSOLUTE: 0.2 K/UL (ref 0–0.6)
EOSINOPHILS RELATIVE PERCENT: 4.1 %
GFR AFRICAN AMERICAN: >60
GFR NON-AFRICAN AMERICAN: >60
GLUCOSE BLD-MCNC: 125 MG/DL (ref 70–99)
HCT VFR BLD CALC: 28 % (ref 40.5–52.5)
HEMOGLOBIN: 9.1 G/DL (ref 13.5–17.5)
LYMPHOCYTES ABSOLUTE: 0.8 K/UL (ref 1–5.1)
LYMPHOCYTES RELATIVE PERCENT: 14.3 %
MAGNESIUM: 1.8 MG/DL (ref 1.8–2.4)
MCH RBC QN AUTO: 31.4 PG (ref 26–34)
MCHC RBC AUTO-ENTMCNC: 32.4 G/DL (ref 31–36)
MCV RBC AUTO: 97.1 FL (ref 80–100)
MONOCYTES ABSOLUTE: 0.5 K/UL (ref 0–1.3)
MONOCYTES RELATIVE PERCENT: 9.8 %
NEUTROPHILS ABSOLUTE: 3.9 K/UL (ref 1.7–7.7)
NEUTROPHILS RELATIVE PERCENT: 70.5 %
PDW BLD-RTO: 16.4 % (ref 12.4–15.4)
PLATELET # BLD: 151 K/UL (ref 135–450)
PMV BLD AUTO: 8.9 FL (ref 5–10.5)
POTASSIUM REFLEX MAGNESIUM: 3.1 MMOL/L (ref 3.5–5.1)
RBC # BLD: 2.88 M/UL (ref 4.2–5.9)
SODIUM BLD-SCNC: 137 MMOL/L (ref 136–145)
WBC # BLD: 5.5 K/UL (ref 4–11)

## 2020-01-02 PROCEDURE — 2580000003 HC RX 258: Performed by: NURSE PRACTITIONER

## 2020-01-02 PROCEDURE — 6370000000 HC RX 637 (ALT 250 FOR IP): Performed by: INTERNAL MEDICINE

## 2020-01-02 PROCEDURE — 71045 X-RAY EXAM CHEST 1 VIEW: CPT

## 2020-01-02 PROCEDURE — 2500000003 HC RX 250 WO HCPCS: Performed by: INTERNAL MEDICINE

## 2020-01-02 PROCEDURE — 92526 ORAL FUNCTION THERAPY: CPT

## 2020-01-02 PROCEDURE — C1751 CATH, INF, PER/CENT/MIDLINE: HCPCS

## 2020-01-02 PROCEDURE — 36569 INSJ PICC 5 YR+ W/O IMAGING: CPT

## 2020-01-02 PROCEDURE — 83735 ASSAY OF MAGNESIUM: CPT

## 2020-01-02 PROCEDURE — C9113 INJ PANTOPRAZOLE SODIUM, VIA: HCPCS | Performed by: NURSE PRACTITIONER

## 2020-01-02 PROCEDURE — 80048 BASIC METABOLIC PNL TOTAL CA: CPT

## 2020-01-02 PROCEDURE — 99232 SBSQ HOSP IP/OBS MODERATE 35: CPT | Performed by: INTERNAL MEDICINE

## 2020-01-02 PROCEDURE — 85025 COMPLETE CBC W/AUTO DIFF WBC: CPT

## 2020-01-02 PROCEDURE — 6370000000 HC RX 637 (ALT 250 FOR IP): Performed by: NURSE PRACTITIONER

## 2020-01-02 PROCEDURE — 6360000002 HC RX W HCPCS: Performed by: NURSE PRACTITIONER

## 2020-01-02 PROCEDURE — 6360000002 HC RX W HCPCS: Performed by: INTERNAL MEDICINE

## 2020-01-02 PROCEDURE — 2580000003 HC RX 258: Performed by: INTERNAL MEDICINE

## 2020-01-02 PROCEDURE — 02HV33Z INSERTION OF INFUSION DEVICE INTO SUPERIOR VENA CAVA, PERCUTANEOUS APPROACH: ICD-10-PCS | Performed by: INTERNAL MEDICINE

## 2020-01-02 RX ORDER — LIDOCAINE HYDROCHLORIDE 10 MG/ML
5 INJECTION, SOLUTION EPIDURAL; INFILTRATION; INTRACAUDAL; PERINEURAL ONCE
Status: COMPLETED | OUTPATIENT
Start: 2020-01-02 | End: 2020-01-02

## 2020-01-02 RX ORDER — LIDOCAINE HYDROCHLORIDE 10 MG/ML
5 INJECTION, SOLUTION EPIDURAL; INFILTRATION; INTRACAUDAL; PERINEURAL ONCE
Status: CANCELLED | OUTPATIENT
Start: 2020-01-02 | End: 2020-01-02

## 2020-01-02 RX ORDER — OXYCODONE HYDROCHLORIDE AND ACETAMINOPHEN 5; 325 MG/1; MG/1
1 TABLET ORAL EVERY 6 HOURS PRN
Qty: 10 TABLET | Refills: 0 | Status: SHIPPED | OUTPATIENT
Start: 2020-01-02 | End: 2020-01-05

## 2020-01-02 RX ORDER — AMIODARONE HYDROCHLORIDE 200 MG/1
TABLET ORAL
Qty: 60 TABLET | Refills: 2 | Status: SHIPPED | OUTPATIENT
Start: 2020-01-02

## 2020-01-02 RX ORDER — SODIUM CHLORIDE 0.9 % (FLUSH) 0.9 %
10 SYRINGE (ML) INJECTION EVERY 12 HOURS SCHEDULED
Status: DISCONTINUED | OUTPATIENT
Start: 2020-01-02 | End: 2020-01-03 | Stop reason: HOSPADM

## 2020-01-02 RX ORDER — SODIUM CHLORIDE 0.9 % (FLUSH) 0.9 %
10 SYRINGE (ML) INJECTION PRN
Status: DISCONTINUED | OUTPATIENT
Start: 2020-01-02 | End: 2020-01-03 | Stop reason: HOSPADM

## 2020-01-02 RX ORDER — SODIUM CHLORIDE 0.9 % (FLUSH) 0.9 %
10 SYRINGE (ML) INJECTION EVERY 12 HOURS SCHEDULED
Status: CANCELLED | OUTPATIENT
Start: 2020-01-02

## 2020-01-02 RX ORDER — OXYCODONE HCL 10 MG/1
10 TABLET, FILM COATED, EXTENDED RELEASE ORAL EVERY 12 HOURS
Qty: 4 EACH | Refills: 0 | Status: SHIPPED | OUTPATIENT
Start: 2020-01-02 | End: 2020-01-04

## 2020-01-02 RX ORDER — SODIUM CHLORIDE 0.9 % (FLUSH) 0.9 %
10 SYRINGE (ML) INJECTION PRN
Status: CANCELLED | OUTPATIENT
Start: 2020-01-02

## 2020-01-02 RX ORDER — METOPROLOL TARTRATE 50 MG/1
50 TABLET, FILM COATED ORAL 2 TIMES DAILY
Qty: 60 TABLET | Refills: 3 | Status: SHIPPED | OUTPATIENT
Start: 2020-01-02

## 2020-01-02 RX ADMIN — VANCOMYCIN HYDROCHLORIDE 1250 MG: 10 INJECTION, POWDER, LYOPHILIZED, FOR SOLUTION INTRAVENOUS at 01:24

## 2020-01-02 RX ADMIN — PANTOPRAZOLE SODIUM 40 MG: 40 INJECTION, POWDER, FOR SOLUTION INTRAVENOUS at 10:46

## 2020-01-02 RX ADMIN — OXYCODONE HYDROCHLORIDE AND ACETAMINOPHEN 1 TABLET: 5; 325 TABLET ORAL at 17:20

## 2020-01-02 RX ADMIN — POTASSIUM BICARBONATE 40 MEQ: 782 TABLET, EFFERVESCENT ORAL at 10:45

## 2020-01-02 RX ADMIN — OXYCODONE HYDROCHLORIDE AND ACETAMINOPHEN 1 TABLET: 5; 325 TABLET ORAL at 06:05

## 2020-01-02 RX ADMIN — ASPIRIN 81 MG 81 MG: 81 TABLET ORAL at 10:45

## 2020-01-02 RX ADMIN — OXYCODONE HYDROCHLORIDE 10 MG: 10 TABLET, FILM COATED, EXTENDED RELEASE ORAL at 10:45

## 2020-01-02 RX ADMIN — VANCOMYCIN HYDROCHLORIDE 1250 MG: 10 INJECTION, POWDER, LYOPHILIZED, FOR SOLUTION INTRAVENOUS at 13:56

## 2020-01-02 RX ADMIN — Medication 10 ML: at 10:46

## 2020-01-02 RX ADMIN — RIVAROXABAN 20 MG: 20 TABLET, FILM COATED ORAL at 17:20

## 2020-01-02 RX ADMIN — MICONAZOLE NITRATE: 20 POWDER TOPICAL at 10:47

## 2020-01-02 RX ADMIN — METOPROLOL TARTRATE 50 MG: 50 TABLET, FILM COATED ORAL at 10:45

## 2020-01-02 RX ADMIN — AMIODARONE HYDROCHLORIDE 200 MG: 200 TABLET ORAL at 10:45

## 2020-01-02 RX ADMIN — Medication 10 ML: at 00:50

## 2020-01-02 RX ADMIN — LIDOCAINE HYDROCHLORIDE 5 ML: 10 INJECTION, SOLUTION EPIDURAL; INFILTRATION; INTRACAUDAL; PERINEURAL at 18:27

## 2020-01-02 RX ADMIN — TORSEMIDE 20 MG: 20 TABLET ORAL at 10:46

## 2020-01-02 ASSESSMENT — PAIN SCALES - GENERAL
PAINLEVEL_OUTOF10: 7
PAINLEVEL_OUTOF10: 5
PAINLEVEL_OUTOF10: 7
PAINLEVEL_OUTOF10: 6

## 2020-01-02 ASSESSMENT — PAIN DESCRIPTION - LOCATION: LOCATION: LEG

## 2020-01-02 ASSESSMENT — PAIN DESCRIPTION - ORIENTATION: ORIENTATION: RIGHT;LEFT

## 2020-01-02 NOTE — CARE COORDINATION
Patient is okay to be discharged from infectious disease standpoint, once cleared by primary service and other sub-specialties. My final orders are in the chart. Feel free to call me with questions, if needed.       Glenn Kilpatrick MD, MPH  1/2/2020, 3:48 PM  [facility] Infectious Disease   Office: [phone]  Fax: [phone]  Tuesday AM clinic:   [address] [address]  Thursday AM clinic: [facility]

## 2020-01-02 NOTE — PLAN OF CARE
Nutrition Problem: Inadequate energy intake  Intervention: Food and/or Nutrient Delivery: Continue current diet, Modify current ONS  Nutritional Goals: PO greater than 75% at meals/supp

## 2020-01-02 NOTE — CARE COORDINATION
VM to admissions at [facility] regarding tentative transport time.     Tara Baez MSW, [address]

## 2020-01-02 NOTE — PLAN OF CARE
Problem: Pain:  Goal: Pain level will decrease  Description  Pain level will decrease  Outcome: Ongoing  Goal: Control of acute pain  Description  Control of acute pain  Outcome: Ongoing     Problem: Risk for Impaired Skin Integrity  Goal: Tissue integrity - skin and mucous membranes  Description  Structural intactness and normal physiological function of skin and  mucous membranes.   Outcome: Ongoing     Problem: Falls - Risk of:  Goal: Will remain free from falls  Description  Will remain free from falls  Outcome: Ongoing     Problem: Confusion - Acute:  Goal: Absence of continued neurological deterioration signs and symptoms  Description  Absence of continued neurological deterioration signs and symptoms  Outcome: Ongoing  Note:   Pt did not sleep all night except for dozing off for 10-15 minutes at a time  Goal: Mental status will be restored to baseline  Description  Mental status will be restored to baseline  Outcome: Ongoing     Problem: Discharge Planning:  Goal: Ability to perform activities of daily living will improve  Description  Ability to perform activities of daily living will improve  Outcome: Ongoing  Goal: Participates in care planning  Description  Participates in care planning  Outcome: Ongoing     Problem: Injury - Risk of, Physical Injury:  Goal: Will remain free from falls  Description  Will remain free from falls  Outcome: Ongoing     Problem: Mood - Altered:  Goal: Mood stable  Description  Mood stable  Outcome: Ongoing     Problem: Psychomotor Activity - Altered:  Goal: Absence of psychomotor disturbance signs and symptoms  Description  Absence of psychomotor disturbance signs and symptoms  Outcome: Ongoing     Problem: Sensory Perception - Impaired:  Goal: Demonstrations of improved sensory functioning will increase  Description  Demonstrations of improved sensory functioning will increase  Outcome: Ongoing     Problem: Sleep Pattern Disturbance:  Goal: Appears well-rested  Description  Appears well-rested  Outcome: Ongoing     Problem: HEMODYNAMIC STATUS  Goal: Patient has stable vital signs and fluid balance  Outcome: Ongoing     Problem: FLUID AND ELECTROLYTE IMBALANCE  Goal: Fluid and electrolyte balance are achieved/maintained  Outcome: Ongoing

## 2020-01-02 NOTE — PROGRESS NOTES
Infectious Diseases   Progress Note      Admission Date: 12/25/2019  Hospital Day: Hospital Day: 9   Attending: Evelyne Andrews MD  Date of service: 1/2/2020     Chief complaint/ Reason for consult: The patient was seen today for the following:    · Sepsis with high fever  · Complicated MRSA bacteremia  · Severe tinea cruris  · Need for contact isolation  · Diffuse large B-cell lymphoma  · Port-A-Cath in place in the chest  · Chronic hepatitis C    Microbiology:        I have reviewed allavailable micro lab data and cultures    · Blood culture (2/2) - collected on 12/25/2019: MRSA    Staph aureus mrsa (2)     Antibiotic Interpretation BARBARA Status    ciprofloxacin Resistant >=8 mcg/mL     clindamycin Sensitive <=0.25 mcg/mL     erythromycin Resistant >=8 mcg/mL     oxacillin Resistant >=4 mcg/mL     tetracycline Sensitive <=1 mcg/mL     trimethoprim-sulfamethoxazole Resistant >=320 mcg/mL     vancomycin Sensitive 1 mcg/mL           Antibiotics and immunizations:       Current antibiotics: All antibiotics and their doses were reviewed by me    Recent Abx Admin                   vancomycin (VANCOCIN) 1,250 mg in dextrose 5 % 250 mL IVPB (mg) 1,250 mg New Bag 01/02/20 1356     1,250 mg New Bag  0124     1,250 mg New Bag 01/01/20 1503                  Immunization History: All immunization history was reviewed by me today. Immunization History   Administered Date(s) Administered    Pneumococcal Conjugate 13-valent ([id number]) 07/25/2016    Pneumococcal Polysaccharide ([id number]) 02/01/2018    Tdap (Boostrix, Adacel) 03/08/2019       Known drug allergies: All allergies were reviewed and updated    No Known Allergies    Social history:     Social History:  All social andepidemiologic history was reviewed and updated by me today as needed. · Tobacco use:   reports that he quit smoking about 8 years ago. He smoked 1.00 pack per day.  He has never used smokeless tobacco.  · Alcohol use:   reports no LUÍS  · Contact isolation for MRSA  · Fall and aspiration precaution  · DVT prophylaxis  · Discussed all above with patient and RN  · Discussed with Dr. Candance Amble  · Discussed with PICC line nurse    Drug Monitoring:    · Continue monitoring for antibiotic toxicity as follows: Vanco trough, CMP  · Continue to watch for following: new or worsening fever, new hypotension, hives, lip swelling and redness or purulence at vascular access sites. I/v access Management:    · Continue to monitor i.v access sites for erythema, induration, discharge or tenderness. · As always, continue efforts to minimize tubes/lines/drains as clinically appropriate to reduce chances of line associated infections. Thank you for involving me in the care of your patient. I will continue to follow. If you have anyadditional questions, please do not hesitate to contact me. Subjective: Interval history: Patient was seen and examined at bedside. Interval history was obtained. Encephalopathy slowly improving. He is afebrile. Tolerating IV vancomycin okay. REVIEW OF SYSTEMS:    Review of Systems   Unable to perform ROS: Mental status change       Past Medical History: All past medical history reviewed today. Past Medical History:   Diagnosis Date    Atrial fibrillation ([address]     B-cell lymphoma ([address]     Back pain     Cancer (HCC)     Cerebral infarction ([address]     CHF (congestive heart failure) (HCC)     Chronic hepatitis C without hepatic coma (HCC)     Chronic obstructive pulmonary disease ([address]     Hypertension     Hypokalemia     Infestation by bed bug     2018    MRSA bacteremia 12/25/2019    MRSA colonization 12/04/2019    Obesity        Past Surgical History: All past surgical history was reviewed today.     Past Surgical History:   Procedure Laterality Date    FINE NEEDLE ASPIRATION      JOINT REPLACEMENT Right 2010    right TKA Knee via Right    KNEE SURGERY      TUNNELED VENOUS PORT PLACEMENT Left 0.8 0.9 0.9   CALCIUM 8.2* 8.0* 8.0*   GLUCOSE 111* 165* 125*        Hepatic Function Panel:   Lab Results   Component Value Date    ALKPHOS 136 12/26/2019    ALT 19 12/26/2019    AST 34 12/26/2019    PROT 7.2 12/26/2019    PROT 7.0 06/01/2017    BILITOT 1.3 12/26/2019    BILIDIR 0.3 05/09/2019    IBILI 0.5 05/09/2019    LABALBU 2.7 12/26/2019       CPK:   Lab Results   Component Value Date    CKTOTAL 33 (L) 12/25/2019     ESR: No results found for: SEDRATE  CRP: No results found for: CRP        Imaging: All pertinent images and reports for the current visit were reviewed by me during this visit. IR REMOVE TUNNELED CVAD W SQ PORT/PUMP INSERT   Final Result   Successful subcutaneous Port-A-Cath removal.         IR INSERT PICC VAD W SQ PORT >5 YEARS   Final Result   Successful ultrasound and fluoroscopy guided PICC placement         CT ABDOMEN PELVIS WO CONTRAST   Final Result   Acute right femoral neck fracture. Small right pleural effusion. Mild bibasilar atelectasis. No acute inflammatory abnormality is identified in the abdomen or pelvis. Cholelithiasis. MRI BRAIN W WO CONTRAST   Final Result   1. Motion degrades images limiting evaluation. 2. No convincing acute intracranial abnormality. No acute infarct. 3. Mild global parenchymal volume loss with chronic microvascular ischemic   changes. 4. Scattered mucosal thickening of the paranasal sinuses. 5. Left mastoid effusion. VL Extremity Venous Right   Final Result      CT Head WO Contrast   Final Result   No acute intracranial abnormality. XR CHEST PORTABLE   Final Result   Cardiomegaly with mild interstitial edema, small pleural effusions, and   bibasilar atelectasis             Medications: All current and past medications were reviewed.      torsemide  20 mg Oral Daily    oxyCODONE  10 mg Oral 2 times per day    amiodarone  200 mg Oral BID    [START ON 1/4/2020] amiodarone  200 mg Oral Daily   

## 2020-01-02 NOTE — PLAN OF CARE
Doing well. Await ID recommendations about PICC line placement. Possible discharge today. Discussed with nursing and .

## 2020-01-02 NOTE — CARE COORDINATION
Discharge Plan:     Patient discharged to:  [facility]  Phone: [phone]  Fax: [phone]  SW/DC Planner faxed, [address] and [address]  Narcotic Prescriptions faxed were: n/a  RN: Mojgan Ramsey will call report    Medical Transport with: [address] [facility]  [phone]   time: 8pm  Family advised of discharge?: VM to daughter   Shireen Lopez?:  n/a  All discharge needs met per case management.     Na Mederos MSW, [address]

## 2020-01-02 NOTE — DISCHARGE SUMMARY
Hospital Medicine Discharge Summary    Patient ID: Efrem Postal      Patient's PCP: Rakesh Lindo MD    Admit Date: 12/25/2019     Discharge Date:   1/2/20    Admitting Physician: Cristobal Porter MD     Discharge Physician: Ladi Carreon MD     Discharge Diagnoses: Active Hospital Problems    Diagnosis    Weight loss counseling, encounter for [Z71.3]     Priority: High    Receiving intravenous antibiotic treatment as outpatient [Z79.2]    Sepsis due to methicillin resistant Staphylococcus aureus (MRSA) ([facility] [A41.02]    MRSA bacteremia [R78.81]    Port-A-Cath in place [Z95.828]    Chronic hepatitis C without hepatic coma (HCC) [B18.2]    History of stroke [Z86.73]    Right hemiplegia (HCC) [G81.91]    Chronic obstructive pulmonary disease ([facility] [J44.9]    AMS (altered mental status) [R41.82]    Tinea cruris [B35.6]    Closed fracture of right hip (HCC) [S72.001A]    Diffuse high grade B-cell lymphoma (HCC) [C83.30]    Class 1 obesity due to excess calories with body mass index (BMI) of 31.0 to 31.9 in adult [E66.09, Z68.31]    Atrial fibrillation with RVR ([facility] [I48.91]       The patient was seen and examined on day of discharge and this discharge summary is in conjunction with any daily progress note from day of discharge. History of Present Illness:  Traci Alonzo a 69 y. o. male with past medical history of atrial fibrillation on Xarelto, lymphoma, previous CVA, CHF, hypertension and obesity who presents to the ED with altered mental status.  Patient was sent in by EMS from [facility].  Apparently was confused with baseline mental status and would not take medications this morning.  Apparently significantly different from patient's baseline mental status and sent to the ED for further evaluation and treatment.  Documented DNR CCA per nursing note and ECF.  Patient has history of previous CVA with upon report residual right-sided weakness.  Apparently fell 15 No apparent distress, appears stated age and cooperative. HEENT:  Normal cephalic, atraumatic without obvious deformity. Pupils equal, round, and reactive to light. Extra ocular muscles intact. Conjunctivae/corneas clear. Neck: Supple, with full range of motion. No jugular venous distention. Trachea midline. Respiratory:  Normal respiratory effort. Clear to auscultation, bilaterally without Rales/Wheezes/Rhonchi. Cardiovascular:  Regular rate and rhythm with normal S1/S2 without murmurs, rubs or gallops. Abdomen: Soft, non-tender, non-distended with normal bowel sounds. Musculoskeletal:  No clubbing, cyanosis or edema bilaterally. Full range of motion without deformity. Skin: Skin color, texture, turgor normal.  No rashes or lesions. Neurologic:  Neurovascularly intact without any focal sensory/motor deficits. Cranial nerves: II-XII intact, grossly non-focal.  Psychiatric:  Alert and oriented, thought content appropriate, normal insight  Capillary Refill: Brisk,< 3 seconds   Peripheral Pulses: +2 palpable, equal bilaterally       Labs: For convenience and continuity at follow-up the following most recent labs are provided:      CBC:    Lab Results   Component Value Date    WBC 5.5 01/02/2020    HGB 9.1 01/02/2020    HCT 28.0 01/02/2020     01/02/2020       Renal:    Lab Results   Component Value Date     01/02/2020    K 3.1 01/02/2020     01/02/2020    CO2 28 01/02/2020    BUN 19 01/02/2020    CREATININE 0.9 01/02/2020    CALCIUM 8.0 01/02/2020    PHOS 3.0 08/06/2019         Significant Diagnostic Studies    Radiology:   IR REMOVE TUNNELED CVAD W SQ PORT/PUMP INSERT   Final Result   Successful subcutaneous Port-A-Cath removal.         IR INSERT PICC VAD W SQ PORT >5 YEARS   Final Result   Successful ultrasound and fluoroscopy guided PICC placement         CT ABDOMEN PELVIS WO CONTRAST   Final Result   Acute right femoral neck fracture. Small right pleural effusion.   Mild bibasilar fracture of right hip, sequela              Details   citalopram (CELEXA) 20 MG tablet Take 20 mg by mouth daily      ondansetron (ZOFRAN) 4 MG tablet Take 4 mg by mouth every 6 hours as needed for Nausea or Vomiting      melatonin 3 MG TABS tablet Take 6 mg by mouth nightly as needed (insomnia)      miconazole (MICOTIN) 2 % powder Apply topically 2 times daily. Qty: 45 g, Refills: 1      spironolactone (ALDACTONE) 25 MG tablet Take 0.5 tablets by mouth daily  Qty: 30 tablet, Refills: 3      torsemide (DEMADEX) 20 MG tablet Take 1 tablet by mouth daily  Qty: 30 tablet, Refills: 3      aspirin EC 81 MG EC tablet Take 1 tablet by mouth 2 times daily Take for DVT blood clot prophylaxis. Please avoid missing doses. Qty: 60 tablet, Refills: 0      omeprazole (PRILOSEC) 20 MG delayed release capsule Take 20 mg by mouth daily      potassium chloride (MICRO-K) 10 MEQ extended release capsule Take 10 mEq by mouth daily      vitamin B-12 (CYANOCOBALAMIN) 100 MCG tablet Take 100 mcg by mouth daily      folic acid (FOLVITE) 1 MG tablet Take 1 mg by mouth daily      atorvastatin (LIPITOR) 20 MG tablet TAKE 1 TABLET BY MOUTH DAILY  Qty: 90 tablet, Refills: 3      Emollient (DERMAPHOR) OINT ointment APPLY TOPICALLY TWICE DAILY AS NEEDED  Qty: 453.9 g, Refills: 0      rivaroxaban (XARELTO) 20 MG TABS tablet TAKE 1 TABLET BY MOUTH DAILY  Qty: 30 tablet, Refills: 11      ferrous sulfate (BEST-GLORIA) 325 (65 Fe) MG tablet Take 1 tablet by mouth 2 times daily  Qty: 60 tablet, Refills: 11    Associated Diagnoses: Iron deficiency anemia secondary to inadequate dietary iron intake      acetaminophen (TYLENOL) 325 MG tablet Take 650 mg by mouth every 6 hours as needed for Pain              Time Spent on discharge is more than 30 minutes in the examination, evaluation, counseling and review of medications and discharge plan.       Signed:    Nathanael Rodriguez MD   1/2/2020      Thank you Doc MD Donald for the opportunity to be involved in this patient's care. If you have any questions or concerns please feel free to contact me at [phone].

## 2020-01-02 NOTE — PROGRESS NOTES
trials  · mild to moderate vallecular pooling across all trials  · mild to severe vallecular residue (increases with heavier viscosities)  · moderate posterior pharyngeal wall residue with puree and soft solid  · penetration (remains above laryngeal vestibule) before and during swallow with large bolus thin via cup, and during swallow with thin via straw. · NO aspiration observed  · Pt noted to have cough unrelated to penetration episodes throughout MBS study\"    Recommendations:   1.) Change to Dysphagia II minced and moist diet with thin liquids (no straws)  2.) Medications in puree  3.) Close supervision with PO intake to ensure strict adherence to aspiration precautions/safe swallowing strategies    ST.) Pt will tolerate recommended diet without s/s of aspiration  (ongoing 2020)  2.) If clinical symptoms of penetration/aspiration continue to be noted,Pt will tolerate MBS to r/o aspiration and determine appropriate diet/liquid level. (ongoing 2020)  3.) Pt will tolerate diet advance to least restricted diet, as clinically indicated, with no signs of aspiration (ongoing 2020)  4.) Pt will improve oral motor function via bolus control exercises 5/5 (goal not addressed on this date)    Plan: Continue dysphagia treatment with goals per plan of care. Discharge Recommendations: Continue speech therapy for dysphagia tx after discharge. Patient/Family Education: Discussed recommendations, precautions, and POC with pt and RN, who verbalized understanding.      Timed Code Treatment: 0 min    Total Treatment Time: 16 min    Jerome Norris M.S. [id number] [facility]   Speech-Language Pathologist

## 2020-01-02 NOTE — PROGRESS NOTES
Nutrition Assessment    Type and Reason for Visit: Initial(RD identified LOS)    Nutrition Recommendations:   1. Continue current diet  2. Encourage po  3. RD changed supp to Magic Cups TID- chocolate    Nutrition Assessment: Pt is at risk for nutritional compromise d/t varied po during admission. Pt reports po as 26-50% and % supplement. Pt is not eating much because he doesn't care for the texture of his diet. Pt does not like the nectar thickened drinks. RD to change supp to Magic cups. Pt denies any other needs at this time. Pt will continue to be at risk for further nutritional compromise until po is consistently greater than 75% at meals/supp. Malnutrition Assessment:  · Malnutrition Status: At risk for malnutrition  · Context: Acute illness or injury  · Findings of the 6 clinical characteristics of malnutrition (Minimum of 2 out of 6 clinical characteristics is required to make the diagnosis of moderate or severe Protein Calorie Malnutrition based on AND/ASPEN Guidelines):  1. Energy Intake-Less than or equal to 75% of estimated energy requirement, Greater than or equal to 7 days    2. Weight Loss-No significant weight loss,    3. Fat Loss-No significant subcutaneous fat loss,    4. Muscle Loss-No significant muscle mass loss,    5. Fluid Accumulation-Moderate to severe fluid accumulation, Extremities  6.  Strength-Not measured    Nutrition Risk Level: Moderate    Nutrient Needs:  · Estimated Daily Total Kcal: 8004-0628  · Estimated Daily Protein (g): 100-125 gms  · Estimated Daily Total Fluid (ml/day): 1 ml/kcal    Nutrition Diagnosis:   · Problem: Inadequate energy intake  · Etiology: related to Insufficient energy/nutrient consumption     Signs and symptoms:  as evidenced by Diet history of poor intake    Objective Information:  · Nutrition-Focused Physical Findings: Generalized pitting edema.  BLE +2, Perineal +2 edema  · Wound Type: None  · Current Nutrition Therapies:  · Oral Diet

## 2020-01-03 LAB — BLOOD CULTURE, ROUTINE: NORMAL

## 2020-01-03 NOTE — PROCEDURES
PICC   PROCEDURE   NOTE  Chart reviewed for allergies, diagnosis, labs, known contraindications, reason for line placement and planned length of treatment. Informed consent noted to be signed and on chart. Insertion procedure discussed with patient/family member. Three patient identifiers - Patient name,   and MRN -  completed &  confirmed verbally. Time out performed Hat, mask and eye shield donned. PICC site scrubbed with Chloraprep  One-Step applicator for 30 seconds x 1. Hand Hygiene  performed with 3% Chlorhexidine surgical scrub x1 min prior to  Sterile gloves, sterile gown being donned. Patient draped using maximal sterile barrier technique ( head to toe ). PICC site scrubbed a 2nd time with Chloraprep One-Step applicator x 30 sec. Modified Seldinger  technique/ultrasound assisted and tip locating system utilized for insertion. 1% Lidocaine 5 ml injected interdermally pre-insertion. PCXR obtained due to pt afib and difficulty with dropping into SVC with ESTIVEN PICC & questionable blocking of BONNIE PICC insertion. ESTIVEN PICC then removed. Light pressure dressing applied with abena Pt RN aware, Jaden Vazquez. Positive brisk blood return obtained from all lumens  and each lumen flushed with 10 mls  0.9% Sterile Sodium Chloride. All lumens flush easily with no resistance. Positive pressure valve placed on all lumens followed by Alcohol Swab Caps on end of each. Bio-patch in place. Catheter secured with non-sutured locking device per hospital protocol. Sterile Tegaderm applied over PICC site. Sterile field maintained during procedure. Guide wire and positioning wire accounted for post procedure and disposed of in sharps: YES  Appearance of site: Clean dry and intact without bleeding or edema. All edges of Tegaderm occlusive. Site marked with date and initials of RN placing line. Teaching/brochures given to pt/family.  Bed placed in low position post-procedure. Top 2 side rails in up position call button in reach. Pt. Response to procedure tolerated well. RN notified of all of the above.

## 2020-01-03 NOTE — PROGRESS NOTES
Speech/Language Pathology  Discharge Note    Name: Yung Villalba   : 1945     Speech Therapy/Dysphagia  Pt discharged to [facility] on 20. Bedside Swallow Eval completed 19 (see report). No goals met prior to discharge. Recommend: Continued Dysphagia treatment at [facility], with goals and treatment per [facility]. DIET LEVEL AT DISCHARGE:  1.) Dysphagia II minced and moist diet with thin liquids (no straws)  2.) Medications in puree    DYSPHAGIA GOALS:  1.) Pt will tolerate recommended diet without s/s of aspiration (GOAL NOT MET)  2.) If clinical symptoms of penetration/aspiration continue to be noted,Pt will tolerate MBS to r/o aspiration and determine appropriate diet/liquid level.   (GOAL NOT MET)  3.) Pt will tolerate diet advance to least restricted diet, as clinically indicated, with no signs of aspiration  (GOAL NOT MET)  4.) Pt will improve oral motor function via bolus control exercises 5/5  (GOAL NOT MET)    Miriam Diaz M.A., [address] [place] [address]  Speech-Language Pathologist

## 2020-01-03 NOTE — PROGRESS NOTES
Data- discharge order received, pt  (appointed legal authority) verbalized agreement to discharge, disposition to [facility], [address] reviewed and signed by physician. Action- AVS prepared, LUÍS completed/ reported faxed by case management/. Discharge instruction summary: Diet- dysphagia II nectar thick liquids, Activity- as tolerated, immunizations reviewed , medications prescriptions to be filled at receiving facility except for the controlled prescriptions to be sent: n/a, Transfer code status: DNR-CCA, LDAs to remain with discharge: PICC l arm. DME used: Shauna Garcia Response- Bedside RN to call report to receiving facility. Pt belongings gathered, peripheral IV and cardiac monitoring removed. Disposition to Discharged via cart/stretcher and via ambulance to skilled nursing by EMS transportation, no complications reported.

## 2020-01-03 NOTE — PROGRESS NOTES
Report called to krish em at [facility]. Questions answered, notified of prescriptions that will go with pt. No concerns shared.

## 2020-01-04 LAB — BLOOD CULTURE, ROUTINE: NORMAL

## 2020-01-06 ENCOUNTER — TELEPHONE (OUTPATIENT)
Dept: INFECTIOUS DISEASES | Age: 75
End: 2020-01-06

## 2020-01-06 NOTE — TELEPHONE ENCOUNTER
Patients vanc trough was 28.1. Verified with ECF that it was a true trough. BUN was 16 creatinine is 0.1. Patient is currently on Vancomycin 1.25g Q12hr.

## 2020-01-07 ENCOUNTER — OFFICE VISIT (OUTPATIENT)
Dept: INFECTIOUS DISEASES | Age: 75
End: 2020-01-07
Payer: MEDICARE

## 2020-01-07 VITALS
HEART RATE: 101 BPM | HEIGHT: 73 IN | OXYGEN SATURATION: 91 % | SYSTOLIC BLOOD PRESSURE: 117 MMHG | RESPIRATION RATE: 20 BRPM | WEIGHT: 242 LBS | DIASTOLIC BLOOD PRESSURE: 72 MMHG | BODY MASS INDEX: 32.07 KG/M2

## 2020-01-07 PROCEDURE — G8427 DOCREV CUR MEDS BY ELIG CLIN: HCPCS | Performed by: INTERNAL MEDICINE

## 2020-01-07 PROCEDURE — 1111F DSCHRG MED/CURRENT MED MERGE: CPT | Performed by: INTERNAL MEDICINE

## 2020-01-07 PROCEDURE — 3017F COLORECTAL CA SCREEN DOC REV: CPT | Performed by: INTERNAL MEDICINE

## 2020-01-07 PROCEDURE — 1123F ACP DISCUSS/DSCN MKR DOCD: CPT | Performed by: INTERNAL MEDICINE

## 2020-01-07 PROCEDURE — 1036F TOBACCO NON-USER: CPT | Performed by: INTERNAL MEDICINE

## 2020-01-07 PROCEDURE — G8417 CALC BMI ABV UP PARAM F/U: HCPCS | Performed by: INTERNAL MEDICINE

## 2020-01-07 PROCEDURE — 99214 OFFICE O/P EST MOD 30 MIN: CPT | Performed by: INTERNAL MEDICINE

## 2020-01-07 PROCEDURE — 4040F PNEUMOC VAC/ADMIN/RCVD: CPT | Performed by: INTERNAL MEDICINE

## 2020-01-07 PROCEDURE — G8484 FLU IMMUNIZE NO ADMIN: HCPCS | Performed by: INTERNAL MEDICINE

## 2020-01-07 NOTE — PROGRESS NOTES
Infectious Diseases Outpatient Follow-up Note      Primary Care Physician:  Wilton Hameed MD       History Obtained From:   Patient, EPIC    CHIEF COMPLAINT / ID problem:    Chief Complaint   Patient presents with    Follow-Up from Hospital     IV infusion seen by Dr. Danna Bermudez     Follow-up on MRSA bacteremia  HISTORY OF PRESENT ILLNESS / Interval history:    Here for follow-up on MRSA bacteremia and sepsis. He was seen at [facility] [place] after right hip fracture he could not undergo surgery for severe fungal dermatitis. Given his bedbound status and poor functional status he could not undergo surgery and was discharged to nursing facility. He also has a chest port in place he has a history of lymphoma. He also has history of cirrhosis. He was hospitalized following discharge to nursing home at [facility] with high-grade MRSA bacteremia. I saw him and did a consult while admitted there. His chest port was thought to be the cause focus of infection and this was promptly removed. He now come back for follow-up on IV vancomycin therapy. He is receiving IV vancomycin through PICC line. Fevers are resolved mental status much better abdominal pain swelling seems to be improved he is requesting Chou catheter placement with us whenever he moves has pain secondary to the fracture.   Advised him to discuss with the nursing home physician and see if the Chou catheter will be helpful      Past Medical History:    Past Medical History:   Diagnosis Date    Atrial fibrillation ([address]     B-cell lymphoma ([address]     Back pain     Cancer ([address]     Cerebral infarction ([address]     CHF (congestive heart failure) ([address]     Chronic hepatitis C without hepatic coma ([address]     Chronic obstructive pulmonary disease ([address]     Hypertension     Hypokalemia     Infestation by bed bug     2018    MRSA bacteremia 12/25/2019    MRSA colonization 12/04/2019    Obesity        Past Surgical History:    Past Surgical aspirin EC 81 MG EC tablet Take 1 tablet by mouth 2 times daily Take for DVT blood clot prophylaxis. Please avoid missing doses. 60 tablet 0     No current facility-administered medications for this visit. Allergies:  Patient has no known allergies. Immunizations :   Immunization History   Administered Date(s) Administered    Pneumococcal Conjugate 13-valent ([id number]) 2016    Pneumococcal Polysaccharide ([id number]) 2018    Tdap (Boostrix, Adacel) 2019           Social History:   Social History     Socioeconomic History    Marital status:      Spouse name: None    Number of children: None    Years of education: None    Highest education level: None   Occupational History    None   Social Needs    Financial resource strain: None    Food insecurity:     Worry: None     Inability: None    Transportation needs:     Medical: None     Non-medical: None   Tobacco Use    Smoking status: Former Smoker     Packs/day: 1.00     Last attempt to quit: 2011     Years since quittin.6    Smokeless tobacco: Never Used   Substance and Sexual Activity    Alcohol use: No     Alcohol/week: 0.0 standard drinks    Drug use: No    Sexual activity: Never   Lifestyle    Physical activity:     Days per week: None     Minutes per session: None    Stress: None   Relationships    Social connections:     Talks on phone: None     Gets together: None     Attends Scientologist service: None     Active member of club or organization: None     Attends meetings of clubs or organizations: None     Relationship status: None    Intimate partner violence:     Fear of current or ex partner: None     Emotionally abused: None     Physically abused: None     Forced sexual activity: None   Other Topics Concern    None   Social History Narrative    None     Family History   Problem Relation Age of Onset    Alzheimer's Disease Mother          REVIEW OF SYSTEMS:    No fevers, chills, sweats.   No weight loss. No visual change, eye pain, eye discharge. No oral lesions, sore throat, dysphagia. Denies cough / sputum. Denies chest pain, palpitations. Denies nausea, vomiting, abdominal pain, no diarrhea. Denies dysuria or change in urinary function. Denies joint swelling or pain. No myalgia, arthralgia. Denies focal weakness, sensory change or other neurologic symptoms  No lymph node swelling or tenderness. No symptoms endocrinopathy. No symptoms hematologic disease.   Right hip pain, fungal rash  PHYSICAL EXAM:    Vitals:    /72 (Site: Right Upper Arm, Position: Sitting, Cuff Size: Medium Adult)   Pulse 101   Resp 20   Ht 6' 1\" (1.854 m)   Wt 242 lb (109.8 kg)   SpO2 91%   BMI 31.93 kg/m²   General Appearance: alert,  in no acute distress, +pallor, no icterus   Skin: warm and dry, no rash or erythema  Head: normocephalic and atraumatic  Eyes: pupils equal, round, and reactive to light, conjunctivae normal  ENT: tympanic membrane, external ear and ear canal normal bilaterally, nose without deformity, nasal mucosa and turbinates normal without polyps  Neck: supple and non-tender without mass, no thyromegaly or thyroid nodules, no cervical lymphadenopathy  Pulmonary/Chest: clear to auscultation bilaterally- no wheezes, rales or rhonchi, normal air movement,  Cardiovascular: normal rate, regular rhythm, normal S1 and S2, no murmurs, rubs, clicks, or gallops,   Abdomen: soft, non-tender, +-distended, normal bowel sounds, no masses or organomegaly  Extremities: no cyanosis, clubbing or edema  Musculoskeletal: normal range of motion, no joint swelling, deformity or tenderness  Neurologic: reflexes normal and symmetric, no cranial nerve deficit,   Psych:  Orientation, sensorium, mood normal  PICC line in place  Chest port removal site healing  Right hip painful with movement         DATA:    See EPIC  Lab Results   Component Value Date    WBC 5.5 01/02/2020    HGB 9.1 (L) 01/02/2020    HCT 28.0 (L) 01/02/2020    MCV 97.1 01/02/2020     01/02/2020     Lab Results   Component Value Date    CREATININE 0.9 01/02/2020    BUN 19 01/02/2020     01/02/2020    K 3.1 (L) 01/02/2020     01/02/2020    CO2 28 01/02/2020       Hepatic Function Panel:   Lab Results   Component Value Date    ALKPHOS 136 12/26/2019    ALT 19 12/26/2019    AST 34 12/26/2019    PROT 7.2 12/26/2019    PROT 7.0 06/01/2017    BILITOT 1.3 12/26/2019    BILIDIR 0.3 05/09/2019    IBILI 0.5 05/09/2019    LABALBU 2.7 12/26/2019     UA:  Lab Results   Component Value Date    COLORU YELLOW 12/25/2019    CLARITYU CLOUDY 12/25/2019    GLUCOSEU Negative 12/25/2019    BILIRUBINUR Negative 12/25/2019    KETUA Negative 12/25/2019    SPECGRAV 1.011 12/25/2019    BLOODU Negative 12/25/2019    PHUR 7.0 12/25/2019    PROTEINU Negative 12/25/2019    UROBILINOGEN 1.0 12/25/2019    NITRU Negative 12/25/2019    LEUKOCYTESUR Negative 12/25/2019    LABMICR YES 12/25/2019    URINETYPE NotGiven 12/25/2019        No orders to display       Labs, Microbiology and  Radiology results pertinent to this visit and from [facility] every where  reviewed in detail by me as part of the consultation     IMPRESSION:     Diagnosis Orders   1. MRSA bacteremia     2. Anasarca     3. History of stroke     4. Sepsis due to methicillin resistant Staphylococcus aureus (MRSA) without acute organ dysfunction ([facility] [address]     5. Receiving intravenous antibiotic treatment as outpatient       Clinically improving on IV vancomycin therapy. He did have a high-grade bacteremia anticipated duration of 6 weeks discussed with the patient  He is tolerating IV antibiotic therapy okay we need to adjust the dosing. PLAN:    1. Cont IV Vancomycin lower the dose x 1250 mg x Q 24 HRS stop date 2/20   2. lABS reviewed  3. PICC line in place  4. He has to d/w NH physician about rossi   5. Follow up as needed  6. Orders given to NH about IV abx     More than 50% of Face-to- Face time was spent in counseling and/or coordination of care including  reviewing data, discussing multiple problems and medications, formulating a plan. D/w Patient in detail at  the time of consultation. Thanks for allowing me to participate in your patient's care and please do not hesitate to  call me with any questions or concerns.     Ehsan Dunn MD  Infectious Disease  [facility]) Physician  Phone: [phone]   Fax : [phone]

## 2020-01-07 NOTE — TELEPHONE ENCOUNTER
Spoke with nurse Cristin Cormier at [facility] and gave orders.

## 2020-01-08 ENCOUNTER — TELEPHONE (OUTPATIENT)
Dept: INFECTIOUS DISEASES | Age: 75
End: 2020-01-08

## 2020-01-08 LAB — VANCOMYCIN TROUGH: 20.6

## 2020-01-08 NOTE — TELEPHONE ENCOUNTER
Spoke with Stefanie Sanchez at [facility] and gave v.o to restart pt on IV Vancomycin 1.25 gm Q24HRS

## 2020-01-08 NOTE — TELEPHONE ENCOUNTER
Patients random vancomycin from today is 20.6. Patients vancomycin has been held since Monday. Patients dose was 1.25g Q12hr.

## 2020-01-13 ENCOUNTER — TELEPHONE (OUTPATIENT)
Dept: INFECTIOUS DISEASES | Age: 75
End: 2020-01-13

## 2020-01-13 LAB
ALBUMIN SERPL-MCNC: 2.5 G/DL
ALP BLD-CCNC: 231 U/L
ALT SERPL-CCNC: 14 U/L
ANION GAP SERPL CALCULATED.3IONS-SCNC: 0.7 MMOL/L
AST SERPL-CCNC: 20 U/L
BASOPHILS ABSOLUTE: ABNORMAL
BASOPHILS RELATIVE PERCENT: 0.5 %
BILIRUB SERPL-MCNC: 0.6 MG/DL (ref 0.1–1.4)
BUN BLDV-MCNC: 21 MG/DL
C-REACTIVE PROTEIN: 71.2
CALCIUM SERPL-MCNC: 8.3 MG/DL
CHLORIDE BLD-SCNC: 101 MMOL/L
CO2: 34 MMOL/L
CREAT SERPL-MCNC: 1.2 MG/DL
EOSINOPHILS ABSOLUTE: 0.2 /ΜL
EOSINOPHILS RELATIVE PERCENT: 3.2 %
GFR CALCULATED: 59
GLUCOSE BLD-MCNC: 83 MG/DL
HCT VFR BLD CALC: 28.3 % (ref 41–53)
HEMOGLOBIN: 9.1 G/DL (ref 13.5–17.5)
LYMPHOCYTES ABSOLUTE: 0.8 /ΜL
LYMPHOCYTES RELATIVE PERCENT: 12.3 %
MCH RBC QN AUTO: 31.5 PG
MCHC RBC AUTO-ENTMCNC: 32.2 G/DL
MCV RBC AUTO: 97.8 FL
MONOCYTES ABSOLUTE: 0.6 /ΜL
MONOCYTES RELATIVE PERCENT: 8.9 %
NEUTROPHILS ABSOLUTE: 5.1 /ΜL
NEUTROPHILS RELATIVE PERCENT: 75.1 %
PLATELET # BLD: 174 K/ΜL
PMV BLD AUTO: 8.7 FL
POTASSIUM SERPL-SCNC: 3.7 MMOL/L
RBC # BLD: 2.89 10^6/ΜL
SODIUM BLD-SCNC: 140 MMOL/L
TOTAL PROTEIN: 6.1
VANCOMYCIN TROUGH: 17.9
VANCOMYCIN TROUGH: 22.5
WBC # BLD: 6.8 10^3/ML

## 2020-01-13 NOTE — TELEPHONE ENCOUNTER
1/13/20 labs  Vancomycin trough - 22.5  Creatinine 1.2    Spoke with Rachna at [facility]  Initially nurse reported true trough. Asked nurse if patient had received next dose after lab was obtained and she stated no. Pt is dosing IV Vancomycin 1.25 gm Q24HRS 9-10 p.m. and labs were drawn at 6 a.m the next day. I asked nurse to draw a true trough tonight before next dose. Do you want to hold or continue with same dose?

## 2020-01-14 ENCOUNTER — TELEPHONE (OUTPATIENT)
Dept: INFECTIOUS DISEASES | Age: 75
End: 2020-01-14

## 2020-01-14 NOTE — TELEPHONE ENCOUNTER
He will need to be seen in the clinic for that. Okay to follow-up with me or Dr. Kathi Navarrete as an outpatient.

## 2020-01-20 LAB
ALBUMIN SERPL-MCNC: 2.7 G/DL
ALP BLD-CCNC: 156 U/L
ALT SERPL-CCNC: 10 U/L
ANION GAP SERPL CALCULATED.3IONS-SCNC: 0.7 MMOL/L
AST SERPL-CCNC: 17 U/L
BASOPHILS ABSOLUTE: 0.1 /ΜL
BASOPHILS RELATIVE PERCENT: 1.2 %
BILIRUB SERPL-MCNC: 0.7 MG/DL (ref 0.1–1.4)
BUN BLDV-MCNC: 21 MG/DL
C-REACTIVE PROTEIN: 101.9
CALCIUM SERPL-MCNC: 8.8 MG/DL
CHLORIDE BLD-SCNC: 98 MMOL/L
CO2: 35 MMOL/L
CREAT SERPL-MCNC: 1.1 MG/DL
EOSINOPHILS ABSOLUTE: 0.3 /ΜL
EOSINOPHILS RELATIVE PERCENT: 5.9 %
GFR CALCULATED: 66
GLUCOSE BLD-MCNC: 62 MG/DL
HCT VFR BLD CALC: 27 % (ref 41–53)
HEMOGLOBIN: 8.9 G/DL (ref 13.5–17.5)
LYMPHOCYTES ABSOLUTE: 0.7 /ΜL
LYMPHOCYTES RELATIVE PERCENT: 11.8 %
MCH RBC QN AUTO: 32.4 PG
MCHC RBC AUTO-ENTMCNC: 32.9 G/DL
MCV RBC AUTO: 98.4 FL
MONOCYTES ABSOLUTE: 0.8 /ΜL
MONOCYTES RELATIVE PERCENT: 13.9 %
NEUTROPHILS ABSOLUTE: 3.8 /ΜL
NEUTROPHILS RELATIVE PERCENT: 67.2 %
PDW BLD-RTO: 17.6 %
PLATELET # BLD: 193 K/ΜL
PMV BLD AUTO: 8.3 FL
POTASSIUM SERPL-SCNC: 3.5 MMOL/L
RBC # BLD: 2.74 10^6/ΜL
SODIUM BLD-SCNC: 137 MMOL/L
TOTAL PROTEIN: 6.8
WBC # BLD: 5.7 10^3/ML

## 2020-01-21 ENCOUNTER — TELEPHONE (OUTPATIENT)
Dept: INFECTIOUS DISEASES | Age: 75
End: 2020-01-21

## 2020-01-27 LAB
ALBUMIN SERPL-MCNC: 2.7 G/DL
ALP BLD-CCNC: 171 U/L
ALT SERPL-CCNC: 13 U/L
ANION GAP SERPL CALCULATED.3IONS-SCNC: 0.8 MMOL/L
AST SERPL-CCNC: 17 U/L
BASOPHILS ABSOLUTE: ABNORMAL
BASOPHILS RELATIVE PERCENT: 0.6 %
BILIRUB SERPL-MCNC: 0.7 MG/DL (ref 0.1–1.4)
BUN BLDV-MCNC: 23 MG/DL
C-REACTIVE PROTEIN: 97
CALCIUM SERPL-MCNC: 8.1 MG/DL
CHLORIDE BLD-SCNC: 99 MMOL/L
CO2: 30 MMOL/L
CREAT SERPL-MCNC: 1.1 MG/DL
EOSINOPHILS ABSOLUTE: 0.3 /ΜL
EOSINOPHILS RELATIVE PERCENT: 3.6 %
GFR CALCULATED: 66
GLUCOSE BLD-MCNC: 93 MG/DL
HCT VFR BLD CALC: 26.9 % (ref 41–53)
HEMOGLOBIN: 8.9 G/DL (ref 13.5–17.5)
LYMPHOCYTES ABSOLUTE: 0.8 /ΜL
LYMPHOCYTES RELATIVE PERCENT: 9.9 %
MCH RBC QN AUTO: 31.9 PG
MCHC RBC AUTO-ENTMCNC: 33.1 G/DL
MCV RBC AUTO: 96.3 FL
MONOCYTES ABSOLUTE: 0.8 /ΜL
MONOCYTES RELATIVE PERCENT: 10.3 %
NEUTROPHILS ABSOLUTE: 5.8 /ΜL
NEUTROPHILS RELATIVE PERCENT: 75.6 %
PDW BLD-RTO: 16.7 %
PLATELET # BLD: 216 K/ΜL
PMV BLD AUTO: 7.6 FL
POTASSIUM SERPL-SCNC: 3.5 MMOL/L
RBC # BLD: 2.8 10^6/ΜL
SODIUM BLD-SCNC: 138 MMOL/L
TOTAL PROTEIN: 6.1
VANCOMYCIN TROUGH: 19.6
WBC # BLD: 7.6 10^3/ML

## 2020-01-29 ENCOUNTER — OFFICE VISIT (OUTPATIENT)
Dept: INFECTIOUS DISEASES | Age: 75
End: 2020-01-29
Payer: MEDICARE

## 2020-01-29 VITALS
OXYGEN SATURATION: 97 % | RESPIRATION RATE: 22 BRPM | WEIGHT: 227 LBS | HEART RATE: 115 BPM | HEIGHT: 73 IN | BODY MASS INDEX: 30.09 KG/M2

## 2020-01-29 PROCEDURE — G8417 CALC BMI ABV UP PARAM F/U: HCPCS | Performed by: INTERNAL MEDICINE

## 2020-01-29 PROCEDURE — 1111F DSCHRG MED/CURRENT MED MERGE: CPT | Performed by: INTERNAL MEDICINE

## 2020-01-29 PROCEDURE — G8427 DOCREV CUR MEDS BY ELIG CLIN: HCPCS | Performed by: INTERNAL MEDICINE

## 2020-01-29 PROCEDURE — 1123F ACP DISCUSS/DSCN MKR DOCD: CPT | Performed by: INTERNAL MEDICINE

## 2020-01-29 PROCEDURE — 1036F TOBACCO NON-USER: CPT | Performed by: INTERNAL MEDICINE

## 2020-01-29 PROCEDURE — G8484 FLU IMMUNIZE NO ADMIN: HCPCS | Performed by: INTERNAL MEDICINE

## 2020-01-29 PROCEDURE — 3017F COLORECTAL CA SCREEN DOC REV: CPT | Performed by: INTERNAL MEDICINE

## 2020-01-29 PROCEDURE — 99214 OFFICE O/P EST MOD 30 MIN: CPT | Performed by: INTERNAL MEDICINE

## 2020-01-29 PROCEDURE — 4040F PNEUMOC VAC/ADMIN/RCVD: CPT | Performed by: INTERNAL MEDICINE

## 2020-01-29 RX ORDER — FLUCONAZOLE 100 MG/1
100 TABLET ORAL DAILY
Qty: 21 TABLET | Refills: 0 | Status: SHIPPED | OUTPATIENT
Start: 2020-01-29 | End: 2020-02-19

## 2020-01-29 NOTE — PROGRESS NOTES
Infectious Diseases Outpatient Follow-up Note      Primary Care Physician:  Fay Pulido MD       History Obtained From:   Patient, EPIC    CHIEF COMPLAINT / ID problem:    Chief Complaint   Patient presents with    Follow-up     MRSA bacteremia       HISTORY OF PRESENT ILLNESS / Interval history:    Here for follow-up on MRSA bacteremia with chest port infection. He also had a right hip fracture unable to undergo surgery at this time we can given the sepsis and bacteremia. He has several medical comorbidities including the lymphoma poor mobility cirrhosis of liver. He is currently living in a nursing home. He is receiving IV vancomycin through PICC line. He had severe fungal dermatitis in the groin that seems to be slowly resolving. Vancomycin level is been in the desired range ESR CRP trending down      Past Medical History:    Past Medical History:   Diagnosis Date    Atrial fibrillation (HCC)     B-cell lymphoma (HCC)     Back pain     Cancer (HCC)     Cerebral infarction ([address]     CHF (congestive heart failure) (HCC)     Chronic hepatitis C without hepatic coma (HCC)     Chronic obstructive pulmonary disease ([address]     Hypertension     Hypokalemia     Infestation by bed bug     2018    MRSA bacteremia 12/25/2019    MRSA colonization 12/04/2019    Obesity        Past Surgical History:    Past Surgical History:   Procedure Laterality Date    FINE NEEDLE ASPIRATION      JOINT REPLACEMENT Right 2010    right TKA Knee via Right    KNEE SURGERY      TUNNELED VENOUS PORT PLACEMENT Left 09/02/2016    DR. Marin Jacobsen, ANGIODYNAMIC POWER PORT       Current Medications:    Current Outpatient Medications   Medication Sig Dispense Refill    fluconazole (DIFLUCAN) 100 MG tablet Take 1 tablet by mouth daily for 21 days For fungal rash 21 tablet 0    amiodarone (CORDARONE) 200 MG tablet 1 tab two times a day for 2 days and then 1 tab once a day 60 tablet 2    metoprolol tartrate (LOPRESSOR) 50 MG tablet Take 1 tablet by mouth 2 times daily 60 tablet 3    citalopram (CELEXA) 20 MG tablet Take 20 mg by mouth daily      ondansetron (ZOFRAN) 4 MG tablet Take 4 mg by mouth every 6 hours as needed for Nausea or Vomiting      melatonin 3 MG TABS tablet Take 6 mg by mouth nightly as needed (insomnia)      miconazole (MICOTIN) 2 % powder Apply topically 2 times daily. 45 g 1    spironolactone (ALDACTONE) 25 MG tablet Take 0.5 tablets by mouth daily 30 tablet 3    torsemide (DEMADEX) 20 MG tablet Take 1 tablet by mouth daily 30 tablet 3    aspirin EC 81 MG EC tablet Take 1 tablet by mouth 2 times daily Take for DVT blood clot prophylaxis. Please avoid missing doses. 60 tablet 0    omeprazole (PRILOSEC) 20 MG delayed release capsule Take 20 mg by mouth daily      potassium chloride (MICRO-K) 10 MEQ extended release capsule Take 10 mEq by mouth daily      vitamin B-12 (CYANOCOBALAMIN) 100 MCG tablet Take 100 mcg by mouth daily      folic acid (FOLVITE) 1 MG tablet Take 1 mg by mouth daily      atorvastatin (LIPITOR) 20 MG tablet TAKE 1 TABLET BY MOUTH DAILY 90 tablet 3    Emollient (DERMAPHOR) OINT ointment APPLY TOPICALLY TWICE DAILY AS NEEDED (Patient taking differently: APPLY TOPICALLY TWICE DAILY AS NEEDED FOR SKIN DRYNESS) 453.9 g 0    rivaroxaban (XARELTO) 20 MG TABS tablet TAKE 1 TABLET BY MOUTH DAILY 30 tablet 11    ferrous sulfate (BEST-GLORIA) 325 (65 Fe) MG tablet Take 1 tablet by mouth 2 times daily 60 tablet 11    acetaminophen (TYLENOL) 325 MG tablet Take 650 mg by mouth every 6 hours as needed for Pain        No current facility-administered medications for this visit. Allergies:  Patient has no known allergies.       Immunizations :   Immunization History   Administered Date(s) Administered    Pneumococcal Conjugate 13-valent ([id number]) 07/25/2016    Pneumococcal Polysaccharide ([id number]) 02/01/2018    Tdap (Boostrix, Adacel) 03/08/2019           Social History:    Social Date    COLORU YELLOW 12/25/2019    CLARITYU CLOUDY 12/25/2019    GLUCOSEU Negative 12/25/2019    BILIRUBINUR Negative 12/25/2019    KETUA Negative 12/25/2019    SPECGRAV 1.011 12/25/2019    BLOODU Negative 12/25/2019    PHUR 7.0 12/25/2019    PROTEINU Negative 12/25/2019    UROBILINOGEN 1.0 12/25/2019    NITRU Negative 12/25/2019    LEUKOCYTESUR Negative 12/25/2019    LABMICR YES 12/25/2019    URINETYPE NotGiven 12/25/2019        No orders to display     RHYS : 12/25   Summary   Normal left ventricle size, wall thickness, and systolic function with an   estimated ejection fraction of 55-60%. No regional wall motion abnormalities   are seen. Moderate mitral regurgitation is present. .   Moderate to severe tricuspid regurgitation. The left and right atrium are severely dilated. No vegetation or masses are seen on the heart valves. Signature      ------------------------------------------------------------------   Electronically signed by Jermaine Conn MD, [address] (Interpreting   physician) on 12/30/2019 at 03:55 PM    Creat  0.7     Vancomycin level 22.5           Labs, Microbiology and  Radiology results pertinent to this visit and from care every where  reviewed in detail by me as part of the consultation     IMPRESSION:     Diagnosis Orders   1. MRSA bacteremia     2. Sepsis due to methicillin resistant Staphylococcus aureus (MRSA) without acute organ dysfunction ([facility]     3. Receiving intravenous antibiotic treatment as outpatient     4. Chronic hepatitis C without hepatic coma ([facility]     5. History of stroke     6. Bacteremia     7. Fungal dermatitis       Clinically slowly improving on IV antibiotic therapy. He had a high-grade bacteremia with MRSA from the chest port infection and has been removed and . Total 8 weeks of IV antibiotic therapy given the high-grade bacteremia. He is tolerating IV antibiotic therapy okay PICC line working well. PLAN:    1.  Cont IV Vancomycin as before stop date 2/20  2. Labs reviewed  3. Fevers trend down  4. Fluconazole for th e rash in the groin   5. Micro CX REVIEWED  6. foLLOW up x 4 weeks   7. Orders written       More than 50% of Face-to- Face time was spent in counseling and/or coordination of care including  reviewing data, discussing multiple problems and medications, formulating a plan. D/w Patient in detail at  the time of consultation. Thanks for allowing me to participate in your patient's care and please do not hesitate to  call me with any questions or concerns.     Jesus Gleason MD  Infectious Disease  [facility] ([facility]) Physician  Phone: [phone]   Fax : [phone]

## 2020-02-03 LAB
ALBUMIN SERPL-MCNC: 2.7 G/DL
ALP BLD-CCNC: 180 U/L
ALT SERPL-CCNC: 23 U/L
ANION GAP SERPL CALCULATED.3IONS-SCNC: 9 MMOL/L
AST SERPL-CCNC: 28 U/L
BASOPHILS ABSOLUTE: 0.2 /ΜL
BASOPHILS RELATIVE PERCENT: 2 %
BILIRUB SERPL-MCNC: 0.3 MG/DL (ref 0.1–1.4)
BUN BLDV-MCNC: 26 MG/DL
C-REACTIVE PROTEIN: 79.2
CALCIUM SERPL-MCNC: 8.3 MG/DL
CHLORIDE BLD-SCNC: 97 MMOL/L
CO2: 30 MMOL/L
CREAT SERPL-MCNC: 1.19 MG/DL
EOSINOPHILS ABSOLUTE: 0.4 /ΜL
EOSINOPHILS RELATIVE PERCENT: 5 %
GFR CALCULATED: 59
GLUCOSE BLD-MCNC: 139 MG/DL
HCT VFR BLD CALC: 28.6 % (ref 41–53)
HEMOGLOBIN: 9.2 G/DL (ref 13.5–17.5)
LYMPHOCYTES ABSOLUTE: 1.1 /ΜL
LYMPHOCYTES RELATIVE PERCENT: 14 %
MCH RBC QN AUTO: 30.5 PG
MCHC RBC AUTO-ENTMCNC: 32.1 G/DL
MCV RBC AUTO: 95 FL
MONOCYTES ABSOLUTE: 0.6 /ΜL
MONOCYTES RELATIVE PERCENT: 9 %
NEUTROPHILS ABSOLUTE: 5.1 /ΜL
NEUTROPHILS RELATIVE PERCENT: 70 %
PDW BLD-RTO: 16.1 %
PLATELET # BLD: 225 K/ΜL
PMV BLD AUTO: 8.1 FL
POTASSIUM SERPL-SCNC: 4 MMOL/L
RBC # BLD: 3.01 10^6/ΜL
SODIUM BLD-SCNC: 136 MMOL/L
TOTAL PROTEIN: 6.7
VANCOMYCIN TROUGH: 12.5
WBC # BLD: 7.4 10^3/ML

## 2020-02-17 ENCOUNTER — TELEPHONE (OUTPATIENT)
Dept: INTERNAL MEDICINE CLINIC | Age: 75
End: 2020-02-17

## 2020-02-17 ENCOUNTER — CARE COORDINATION (OUTPATIENT)
Dept: CASE MANAGEMENT | Age: 75
End: 2020-02-17

## 2020-03-11 ENCOUNTER — OFFICE VISIT (OUTPATIENT)
Dept: INFECTIOUS DISEASES | Age: 75
End: 2020-03-11
Payer: MEDICARE

## 2020-03-11 VITALS
TEMPERATURE: 98.1 F | OXYGEN SATURATION: 98 % | RESPIRATION RATE: 16 BRPM | SYSTOLIC BLOOD PRESSURE: 122 MMHG | HEART RATE: 95 BPM | DIASTOLIC BLOOD PRESSURE: 76 MMHG | BODY MASS INDEX: 29.95 KG/M2 | HEIGHT: 73 IN

## 2020-03-11 PROCEDURE — 4040F PNEUMOC VAC/ADMIN/RCVD: CPT | Performed by: INTERNAL MEDICINE

## 2020-03-11 PROCEDURE — 1123F ACP DISCUSS/DSCN MKR DOCD: CPT | Performed by: INTERNAL MEDICINE

## 2020-03-11 PROCEDURE — G8484 FLU IMMUNIZE NO ADMIN: HCPCS | Performed by: INTERNAL MEDICINE

## 2020-03-11 PROCEDURE — 1036F TOBACCO NON-USER: CPT | Performed by: INTERNAL MEDICINE

## 2020-03-11 PROCEDURE — G8417 CALC BMI ABV UP PARAM F/U: HCPCS | Performed by: INTERNAL MEDICINE

## 2020-03-11 PROCEDURE — 3017F COLORECTAL CA SCREEN DOC REV: CPT | Performed by: INTERNAL MEDICINE

## 2020-03-11 PROCEDURE — G8427 DOCREV CUR MEDS BY ELIG CLIN: HCPCS | Performed by: INTERNAL MEDICINE

## 2020-03-11 PROCEDURE — 99213 OFFICE O/P EST LOW 20 MIN: CPT | Performed by: INTERNAL MEDICINE

## 2020-03-11 NOTE — PROGRESS NOTES
Refill    amiodarone (CORDARONE) 200 MG tablet 1 tab two times a day for 2 days and then 1 tab once a day 60 tablet 2    metoprolol tartrate (LOPRESSOR) 50 MG tablet Take 1 tablet by mouth 2 times daily 60 tablet 3    citalopram (CELEXA) 20 MG tablet Take 20 mg by mouth daily      ondansetron (ZOFRAN) 4 MG tablet Take 4 mg by mouth every 6 hours as needed for Nausea or Vomiting      melatonin 3 MG TABS tablet Take 6 mg by mouth nightly as needed (insomnia)      miconazole (MICOTIN) 2 % powder Apply topically 2 times daily. 45 g 1    spironolactone (ALDACTONE) 25 MG tablet Take 0.5 tablets by mouth daily 30 tablet 3    torsemide (DEMADEX) 20 MG tablet Take 1 tablet by mouth daily 30 tablet 3    aspirin EC 81 MG EC tablet Take 1 tablet by mouth 2 times daily Take for DVT blood clot prophylaxis. Please avoid missing doses. 60 tablet 0    omeprazole (PRILOSEC) 20 MG delayed release capsule Take 20 mg by mouth daily      potassium chloride (MICRO-K) 10 MEQ extended release capsule Take 10 mEq by mouth daily      vitamin B-12 (CYANOCOBALAMIN) 100 MCG tablet Take 100 mcg by mouth daily      folic acid (FOLVITE) 1 MG tablet Take 1 mg by mouth daily      atorvastatin (LIPITOR) 20 MG tablet TAKE 1 TABLET BY MOUTH DAILY 90 tablet 3    Emollient (DERMAPHOR) OINT ointment APPLY TOPICALLY TWICE DAILY AS NEEDED (Patient taking differently: APPLY TOPICALLY TWICE DAILY AS NEEDED FOR SKIN DRYNESS) 453.9 g 0    rivaroxaban (XARELTO) 20 MG TABS tablet TAKE 1 TABLET BY MOUTH DAILY 30 tablet 11    ferrous sulfate (BEST-GLORIA) 325 (65 Fe) MG tablet Take 1 tablet by mouth 2 times daily 60 tablet 11    acetaminophen (TYLENOL) 325 MG tablet Take 650 mg by mouth every 6 hours as needed for Pain        No current facility-administered medications for this visit. Allergies:  Patient has no known allergies.       Immunizations :   Immunization History   Administered Date(s) Administered    Pneumococcal Conjugate 13-valent Alfred Sierra) 2016    Pneumococcal Polysaccharide ([id number]) 2018    Tdap (Boostrix, Adacel) 2019           Social History:   Social History     Socioeconomic History    Marital status:      Spouse name: None    Number of children: None    Years of education: None    Highest education level: None   Occupational History    None   Social Needs    Financial resource strain: None    Food insecurity     Worry: None     Inability: None    Transportation needs     Medical: None     Non-medical: None   Tobacco Use    Smoking status: Former Smoker     Packs/day: 1.00     Years: 30.00     Pack years: 30.00     Last attempt to quit: 2011     Years since quittin.8    Smokeless tobacco: Never Used   Substance and Sexual Activity    Alcohol use: No     Alcohol/week: 0.0 standard drinks    Drug use: No    Sexual activity: Never   Lifestyle    Physical activity     Days per week: None     Minutes per session: None    Stress: None   Relationships    Social connections     Talks on phone: None     Gets together: None     Attends Orthodoxy service: None     Active member of club or organization: None     Attends meetings of clubs or organizations: None     Relationship status: None    Intimate partner violence     Fear of current or ex partner: None     Emotionally abused: None     Physically abused: None     Forced sexual activity: None   Other Topics Concern    None   Social History Narrative    None     Family History   Problem Relation Age of Onset    Alzheimer's Disease Mother          REVIEW OF SYSTEMS:    No fevers, chills, sweats. No weight loss. No visual change, eye pain, eye discharge. No oral lesions, sore throat, dysphagia. Denies cough / sputum. Denies chest pain, palpitations. Denies nausea, vomiting, abdominal pain, no diarrhea. Denies dysuria or change in urinary function. Denies joint swelling or pain. No myalgia, arthralgia.   Denies focal weakness, sensory change or other neurologic symptoms  No lymph node swelling or tenderness. No symptoms endocrinopathy. No symptoms hematologic disease.     PHYSICAL EXAM:    Vitals:    /76 (Site: Left Lower Arm, Position: Supine)   Pulse 95   Temp 98.1 °F (36.7 °C) (Oral)   Resp 16   Ht 6' 1\" (1.854 m)   SpO2 98%   BMI 29.95 kg/m²   General Appearance: alert,  in no acute distress,+  pallor, no icterus speech changes are answered dysarthria, chronically ill-appearing gentleman  Skin: warm and dry, no rash or erythema  Head: normocephalic and atraumatic  Eyes: pupils equal, round, and reactive to light, conjunctivae normal  ENT: tympanic membrane, external ear and ear canal normal bilaterally, nose without deformity, nasal mucosa and turbinates normal without polyps  Neck: supple and non-tender without mass, no thyromegaly or thyroid nodules, no cervical lymphadenopathy  Pulmonary/Chest: clear to auscultation bilaterally- no wheezes, rales or rhonchi, normal air movement,  Cardiovascular: S1 and S2, no murmurs, rubs, clicks, or gallops,   Abdomen: soft, non-tender, non-distended, normal bowel sounds, no masses or organomegaly  Extremities: no cyanosis, clubbing or + edema  Musculoskeletal: normal range of motion, no joint swelling, deformity or tenderness  Neurologic: reflexes normal and symmetric, no cranial nerve deficit,   Psych:  Orientation, sensorium, mood normal  PICC  Rt hip incision no drainage            DATA:    See EPIC  Lab Results   Component Value Date    WBC 7.4 02/03/2020    HGB 9.2 (A) 02/03/2020    HCT 28.6 (A) 02/03/2020    MCV 95.0 02/03/2020     02/03/2020     Lab Results   Component Value Date    CREATININE 1.19 02/03/2020    BUN 26 02/03/2020     02/03/2020    K 4.0 02/03/2020    CL 97 02/03/2020    CO2 30 02/03/2020       Hepatic Function Panel:   Lab Results   Component Value Date    ALKPHOS 180 02/03/2020    ALT 23 02/03/2020    AST 28 02/03/2020    PROT 7.2 12/26/2019 PROT 7.0 06/01/2017    BILITOT 0.3 02/03/2020    BILIDIR 0.3 05/09/2019    IBILI 0.5 05/09/2019    LABALBU 2.7 02/03/2020     UA:  Lab Results   Component Value Date    COLORU YELLOW 12/25/2019    CLARITYU CLOUDY 12/25/2019    GLUCOSEU Negative 12/25/2019    BILIRUBINUR Negative 12/25/2019    KETUA Negative 12/25/2019    SPECGRAV 1.011 12/25/2019    BLOODU Negative 12/25/2019    PHUR 7.0 12/25/2019    PROTEINU Negative 12/25/2019    UROBILINOGEN 1.0 12/25/2019    NITRU Negative 12/25/2019    LEUKOCYTESUR Negative 12/25/2019    LABMICR YES 12/25/2019    URINETYPE NotGiven 12/25/2019        No orders to display   [facility]  Component Name Value Ref Range   Gram Stain Result Moderate Polymorphonuclear Leukocytes Seen     Gram Stain Result No Organisms Seen;      Culture Result No Growth After 3 Days     Specimen Collected on   Tissue - Tissue 2/11/2020 3:58 PM   Result Narrative   right leg tissue  right leg tissue     Component Name Value Ref Range   Culture Result No Anaerobes Isolated in 5 Days     Specimen Collected on   Tissue - Tissue 2/11/2020 3:58 PM   Result Narrative   right leg tissue  right leg tissue     [facility]  Component Name Value Ref Range   Culture Result No Growth After 5 Days     Specimen Collected on   Blood - Peripheral 2/6/2020 4:01 AM         Labs, Microbiology and  Radiology results pertinent to this visit and from care every where  reviewed in detail by me as part of the consultation     IMPRESSION:     Diagnosis Orders   1. MRSA bacteremia     2. Fungal dermatitis     3. History of stroke     4. Receiving intravenous antibiotic treatment as outpatient       Completed) IV vancomycin therapy as planned. 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 Labs reviewed. Right groin fungal rash seems to be improved. PLAN:    1. D/C PICC line  2. Stop IV Vancomycin therapy   3.  Recent

## 2024-05-30 NOTE — PROGRESS NOTES
Left 09/02/2016    DR. Moreau [facility]     Family History   Problem Relation Age of Onset    Alzheimer's Disease Mother      Social History   Substance Use Topics    Smoking status: Former Smoker     Packs/day: 1.00     Quit date: 5/8/2011    Smokeless tobacco: Never Used    Alcohol use No       No Known Allergies  Current Outpatient Prescriptions   Medication Sig Dispense Refill    mineral oil-hydrophilic petrolatum (AQUAPHOR) ointment Apply topically twice daily as needed. 1 Tube 11    ferrous sulfate (BEST-GLORIA) 325 (65 Fe) MG tablet Take 1 tablet by mouth 2 times daily 60 tablet 11    torsemide (DEMADEX) 20 MG tablet Patient to take 40 mg in the am and 40 mg in the evening. 270 tablet 0    metolazone (ZAROXOLYN) 2.5 MG tablet Take 1 tablet by mouth once a week 15 tablet 3    potassium chloride (KLOR-CON M) 20 MEQ extended release tablet Take 20 mEq tablet twice a day and take 20 mEq tablet three times a day on days that Metolazone is taken 60 tablet 3    clotrimazole (LOTRIMIN AF) 1 % cream Apply topically 2 times daily. 1 Tube 1    XARELTO 20 MG TABS tablet TAKE 1 TABLET BY MOUTH DAILY 30 tablet 6    metoprolol succinate (TOPROL XL) 25 MG extended release tablet TAKE 1 TABLET BY MOUTH DAILY 90 tablet 3    spironolactone (ALDACTONE) 25 MG tablet TAKE 1 TABLET BY MOUTH DAILY 90 tablet 3    atorvastatin (LIPITOR) 20 MG tablet TAKE 1 TABLET BY MOUTH DAILY 90 tablet 3    GNP ASPIRIN LOW DOSE 81 MG EC tablet TAKE 1 TABLET BY MOUTH DAILY 90 tablet 3    lidocaine viscous-aluminum & magnesium hydroxide-simethicone-diphenhydrAMINE-nystatin Take 5-10 mLs by mouth every 6 hours 4 oz viscous lidocaine, 4 oz maalox, 4 oz benadryl, 4 oz mycostatin suspension, flavor to pt desire. Dispense 16 ounce bottle with 5 refills.  480 mL 1    lisinopril (PRINIVIL;ZESTRIL) 5 MG tablet TAKE 1 TABLET BY MOUTH EVERY DAY 90 tablet 3    rivaroxaban (XARELTO) 20 MG TABS tablet Take 1 tablet by mouth daily Type 2 diabetes mellitus with hyperglycemia, with long-term current use of insulin

## (undated) DEVICE — 450 ML BOTTLE OF 0.05% CHLORHEXIDINE GLUCONATE IN 99.95% STERILE WATER FOR IRRIGATION, USP AND APPLICATOR.: Brand: IRRISEPT ANTIMICROBIAL WOUND LAVAGE

## (undated) DEVICE — 4-PORT MANIFOLD: Brand: NEPTUNE 2

## (undated) DEVICE — SYRINGE IRRIG 60ML SFT PLIABLE BLB EZ TO GRP 1 HND USE W/

## (undated) DEVICE — DRAPE,U/ SHT,SPLIT,PLAS,STERIL: Brand: MEDLINE

## (undated) DEVICE — SPONGE LAP W18XL18IN WHT COT 4 PLY FLD STRUNG RADPQ DISP ST

## (undated) DEVICE — DRAPE C ARM UNIV W41XL74IN CLR PLAS XR VELC CLSR POLY STRP

## (undated) DEVICE — GLOVE SURG SZ 75 L12IN FNGR THK87MIL WHT LTX FREE

## (undated) DEVICE — HANDPIECE SET WITH HIGH FLOW TIP AND SUCTION TUBE: Brand: INTERPULSE

## (undated) DEVICE — SUTURE VCRL SZ 2-0 L18IN ABSRB UD CT-1 L36MM 1/2 CIR J839D

## (undated) DEVICE — COVER,MAYO STAND,STERILE: Brand: MEDLINE

## (undated) DEVICE — ALCOHOL RUBBING ISO 16OZ 70%

## (undated) DEVICE — GLOVE SURG SZ 8 L12IN FNGR THK79MIL GRN LTX FREE

## (undated) DEVICE — DUAL CUT SAGITTAL BLADE

## (undated) DEVICE — BANDAGE COMPR W6INXL10YD ST M E WHITE/BEIGE

## (undated) DEVICE — 3M™ IOBAN™ 2 ANTIMICROBIAL INCISE DRAPE 6650EZ: Brand: IOBAN™ 2

## (undated) DEVICE — PAD DRY FLOOR ABS 32X58IN GRN

## (undated) DEVICE — HYPODERMIC SAFETY NEEDLE: Brand: MAGELLAN

## (undated) DEVICE — BIPOLAR SEALER 23-112-1 AQM 6.0: Brand: AQUAMANTYS ®

## (undated) DEVICE — DECANTER BAG 9": Brand: MEDLINE INDUSTRIES, INC.

## (undated) DEVICE — STRIP,CLOSURE,WOUND,MEDI-STRIP,1/2X4: Brand: MEDLINE

## (undated) DEVICE — TOTAL BASIC PK

## (undated) DEVICE — T5 HOOD WITH PEEL AWAY FACE SHIELD

## (undated) DEVICE — COVER LT HNDL BLU PLAS

## (undated) DEVICE — 1010 S-DRAPE TOWEL DRAPE 10/BX: Brand: STERI-DRAPE™

## (undated) DEVICE — SUTURE VCRL SZ 1 L18IN ABSRB UD L36MM CT-1 1/2 CIR J841D

## (undated) DEVICE — PADDING UNDERCAST W4INXL4YD 100% COT CRIMPED FINISH WBRL II

## (undated) DEVICE — SUTURE TICRN BR BL NDL C-20 SZ 5 30 8886302779

## (undated) DEVICE — KIT EVAC 0.13IN RECT TB DIA10FR 400CC PVC 3 SPR Y CONN DRN

## (undated) DEVICE — SYRINGE MED 30ML STD CLR PLAS LUERLOCK TIP N CTRL DISP

## (undated) DEVICE — SUTURE MCRYL SZ 3-0 L18IN ABSRB UD L19MM PS-2 3/8 CIR PRIM Y497G

## (undated) DEVICE — SHEET,DRAPE,53X77,STERILE: Brand: MEDLINE

## (undated) DEVICE — COVER,TABLE,77X90,STERILE: Brand: MEDLINE

## (undated) DEVICE — KIT POS FOAM HANA TBL

## (undated) DEVICE — NEEDLE HYPO 18GA L1.5IN THN WALL PIVOTING SHLD BVL ORIENTED

## (undated) DEVICE — 6619 2 PTNT ISO SYS INCISE AREA&LT;(&GT;&&LT;)&GT;P: Brand: STERI-DRAPE™ IOBAN™ 2

## (undated) DEVICE — SUTURE VCRL SZ 3-0 L18IN ABSRB UD L26MM SH 1/2 CIR J864D

## (undated) DEVICE — CHLORAPREP 26ML ORANGE

## (undated) DEVICE — E-Z CLEAN, NON-STICK, PTFE COATED, ELECTROSURGICAL BLADE ELECTRODE, 4 INCH (10.2 CM): Brand: MEGADYNE

## (undated) DEVICE — TUBE IRRIG HNDPC HI FLO TP INTRPULS W/SUCTION TUBE

## (undated) DEVICE — MERCY HEALTH WEST TURNOVER: Brand: MEDLINE INDUSTRIES, INC.

## (undated) DEVICE — GOWN SIRUS NONREIN XL W/TWL: Brand: MEDLINE INDUSTRIES, INC.

## (undated) DEVICE — ELECTRODE PT RET AD L9FT HI MOIST COND ADH HYDRGEL CORDED

## (undated) DEVICE — BASIC SINGLE BASIN 1-LF: Brand: MEDLINE INDUSTRIES, INC.

## (undated) DEVICE — CANISTER, RIGID, 1200CC: Brand: MEDLINE INDUSTRIES, INC.

## (undated) DEVICE — SOLUTION IV IRRIG POUR BRL 0.9% SODIUM CHL 2F7124

## (undated) DEVICE — SUTURE VCRL SZ 1 L27IN ABSRB UD CT-1 L36MM 1/2 CIR J261H

## (undated) DEVICE — STERILE TOTAL HIP DRAPE PK: Brand: CARDINAL HEALTH

## (undated) DEVICE — SUTURE VCRL SZ 0 L18IN ABSRB UD L36MM CT-1 1/2 CIR J840D